# Patient Record
Sex: FEMALE | Race: WHITE | NOT HISPANIC OR LATINO
[De-identification: names, ages, dates, MRNs, and addresses within clinical notes are randomized per-mention and may not be internally consistent; named-entity substitution may affect disease eponyms.]

---

## 2017-02-02 ENCOUNTER — RX RENEWAL (OUTPATIENT)
Age: 82
End: 2017-02-02

## 2017-02-02 DIAGNOSIS — H40.1132 PRIMARY OPEN-ANGLE GLAUCOMA, BILATERAL, MODERATE STAGE: ICD-10-CM

## 2017-03-08 ENCOUNTER — APPOINTMENT (OUTPATIENT)
Dept: OPHTHALMOLOGY | Facility: CLINIC | Age: 82
End: 2017-03-08

## 2017-05-10 ENCOUNTER — APPOINTMENT (OUTPATIENT)
Dept: CARDIOLOGY | Facility: CLINIC | Age: 82
End: 2017-05-10

## 2017-06-20 ENCOUNTER — APPOINTMENT (OUTPATIENT)
Dept: OPHTHALMOLOGY | Facility: CLINIC | Age: 82
End: 2017-06-20

## 2017-09-12 ENCOUNTER — APPOINTMENT (OUTPATIENT)
Dept: CARDIOLOGY | Facility: CLINIC | Age: 82
End: 2017-09-12
Payer: MEDICARE

## 2017-09-12 PROCEDURE — 99214 OFFICE O/P EST MOD 30 MIN: CPT

## 2017-10-12 ENCOUNTER — APPOINTMENT (OUTPATIENT)
Dept: CARDIOLOGY | Facility: CLINIC | Age: 82
End: 2017-10-12
Payer: MEDICARE

## 2017-10-12 PROCEDURE — 93000 ELECTROCARDIOGRAM COMPLETE: CPT

## 2017-10-12 PROCEDURE — 99214 OFFICE O/P EST MOD 30 MIN: CPT

## 2017-10-13 ENCOUNTER — APPOINTMENT (OUTPATIENT)
Dept: CARDIOLOGY | Facility: CLINIC | Age: 82
End: 2017-10-13
Payer: MEDICARE

## 2017-10-13 PROCEDURE — A9502: CPT

## 2017-10-13 PROCEDURE — 78452 HT MUSCLE IMAGE SPECT MULT: CPT

## 2017-10-13 PROCEDURE — 93015 CV STRESS TEST SUPVJ I&R: CPT

## 2017-10-17 ENCOUNTER — APPOINTMENT (OUTPATIENT)
Dept: CARDIOLOGY | Facility: CLINIC | Age: 82
End: 2017-10-17
Payer: MEDICARE

## 2017-10-17 PROCEDURE — 99214 OFFICE O/P EST MOD 30 MIN: CPT

## 2017-10-24 ENCOUNTER — APPOINTMENT (OUTPATIENT)
Dept: OPHTHALMOLOGY | Facility: CLINIC | Age: 82
End: 2017-10-24
Payer: MEDICARE

## 2017-10-24 PROCEDURE — 92014 COMPRE OPH EXAM EST PT 1/>: CPT

## 2017-10-24 PROCEDURE — 92226: CPT | Mod: 50

## 2017-11-01 ENCOUNTER — APPOINTMENT (OUTPATIENT)
Dept: CARDIOLOGY | Facility: CLINIC | Age: 82
End: 2017-11-01
Payer: MEDICARE

## 2017-11-01 DIAGNOSIS — Z84.1 FAMILY HISTORY OF DISORDERS OF KIDNEY AND URETER: ICD-10-CM

## 2017-11-01 DIAGNOSIS — Z83.511 FAMILY HISTORY OF GLAUCOMA: ICD-10-CM

## 2017-11-01 DIAGNOSIS — Z78.9 OTHER SPECIFIED HEALTH STATUS: ICD-10-CM

## 2017-11-01 DIAGNOSIS — Z80.3 FAMILY HISTORY OF MALIGNANT NEOPLASM OF BREAST: ICD-10-CM

## 2017-11-01 DIAGNOSIS — E03.9 HYPOTHYROIDISM, UNSPECIFIED: ICD-10-CM

## 2017-11-01 DIAGNOSIS — Z82.49 FAMILY HISTORY OF ISCHEMIC HEART DISEASE AND OTHER DISEASES OF THE CIRCULATORY SYSTEM: ICD-10-CM

## 2017-11-01 PROCEDURE — 99214 OFFICE O/P EST MOD 30 MIN: CPT

## 2017-11-14 PROBLEM — Z78.9 NON-SMOKER: Status: ACTIVE | Noted: 2017-11-14

## 2017-11-14 PROBLEM — Z80.3 FAMILY HISTORY OF BREAST CANCER: Status: ACTIVE | Noted: 2017-11-14

## 2017-11-14 PROBLEM — Z82.49 FAMILY HISTORY OF CARDIAC DISORDER: Status: ACTIVE | Noted: 2017-11-14

## 2017-11-14 PROBLEM — Z84.1 FAMILY HISTORY OF KIDNEY DISEASE: Status: ACTIVE | Noted: 2017-11-14

## 2017-11-14 PROBLEM — Z83.511 FAMILY HISTORY OF GLAUCOMA: Status: ACTIVE | Noted: 2017-11-14

## 2017-11-14 PROBLEM — E03.9 HYPOTHYROIDISM: Status: ACTIVE | Noted: 2017-11-14

## 2017-11-28 ENCOUNTER — RECORD ABSTRACTING (OUTPATIENT)
Age: 82
End: 2017-11-28

## 2017-11-28 ENCOUNTER — MEDICATION RENEWAL (OUTPATIENT)
Age: 82
End: 2017-11-28

## 2017-12-06 ENCOUNTER — RX RENEWAL (OUTPATIENT)
Age: 82
End: 2017-12-06

## 2017-12-08 ENCOUNTER — RX RENEWAL (OUTPATIENT)
Age: 82
End: 2017-12-08

## 2017-12-11 ENCOUNTER — APPOINTMENT (OUTPATIENT)
Dept: CARDIOLOGY | Facility: CLINIC | Age: 82
End: 2017-12-11
Payer: MEDICARE

## 2017-12-11 PROCEDURE — 93880 EXTRACRANIAL BILAT STUDY: CPT

## 2017-12-11 PROCEDURE — 93306 TTE W/DOPPLER COMPLETE: CPT

## 2017-12-17 ENCOUNTER — RX RENEWAL (OUTPATIENT)
Age: 82
End: 2017-12-17

## 2017-12-17 ENCOUNTER — MESSAGE (OUTPATIENT)
Age: 82
End: 2017-12-17

## 2018-01-17 ENCOUNTER — APPOINTMENT (OUTPATIENT)
Dept: CARDIOLOGY | Facility: CLINIC | Age: 83
End: 2018-01-17
Payer: MEDICARE

## 2018-01-17 VITALS
WEIGHT: 152 LBS | BODY MASS INDEX: 25.33 KG/M2 | HEART RATE: 52 BPM | DIASTOLIC BLOOD PRESSURE: 62 MMHG | SYSTOLIC BLOOD PRESSURE: 118 MMHG | HEIGHT: 65 IN

## 2018-01-17 PROCEDURE — 93000 ELECTROCARDIOGRAM COMPLETE: CPT

## 2018-01-17 PROCEDURE — 99214 OFFICE O/P EST MOD 30 MIN: CPT

## 2018-01-26 ENCOUNTER — APPOINTMENT (OUTPATIENT)
Dept: CARDIOLOGY | Facility: CLINIC | Age: 83
End: 2018-01-26

## 2018-03-20 ENCOUNTER — APPOINTMENT (OUTPATIENT)
Dept: CARDIOLOGY | Facility: CLINIC | Age: 83
End: 2018-03-20
Payer: MEDICARE

## 2018-03-20 ENCOUNTER — NON-APPOINTMENT (OUTPATIENT)
Age: 83
End: 2018-03-20

## 2018-03-20 VITALS
DIASTOLIC BLOOD PRESSURE: 72 MMHG | HEIGHT: 65 IN | BODY MASS INDEX: 25.33 KG/M2 | WEIGHT: 152 LBS | HEART RATE: 63 BPM | SYSTOLIC BLOOD PRESSURE: 140 MMHG

## 2018-03-20 PROCEDURE — 99215 OFFICE O/P EST HI 40 MIN: CPT

## 2018-03-20 PROCEDURE — 93000 ELECTROCARDIOGRAM COMPLETE: CPT

## 2018-03-20 RX ORDER — DORZOLAMIDE HYDROCHLORIDE 20 MG/ML
2 SOLUTION OPHTHALMIC TWICE DAILY
Qty: 10 | Refills: 3 | Status: DISCONTINUED | COMMUNITY
Start: 2017-10-24 | End: 2018-03-20

## 2018-03-20 RX ORDER — METOPROLOL SUCCINATE 25 MG/1
25 TABLET, EXTENDED RELEASE ORAL DAILY
Refills: 0 | Status: DISCONTINUED | COMMUNITY
End: 2018-03-20

## 2018-03-20 RX ORDER — ARIPIPRAZOLE 5 MG/1
5 TABLET ORAL DAILY
Refills: 0 | Status: DISCONTINUED | COMMUNITY
End: 2018-03-20

## 2018-03-20 RX ORDER — DORZOLAMIDE HYDROCHLORIDE 20 MG/ML
2 SOLUTION OPHTHALMIC TWICE DAILY
Qty: 10 | Refills: 3 | Status: DISCONTINUED | COMMUNITY
Start: 2017-06-20 | End: 2018-03-20

## 2018-03-20 RX ORDER — ROPINIROLE 0.5 MG/1
0.5 TABLET, FILM COATED ORAL DAILY
Refills: 0 | Status: DISCONTINUED | COMMUNITY
End: 2018-03-20

## 2018-03-20 RX ORDER — DORZOLAMIDE HYDROCHLORIDE 20 MG/ML
2 SOLUTION OPHTHALMIC TWICE DAILY
Qty: 10 | Refills: 3 | Status: DISCONTINUED | COMMUNITY
Start: 2017-12-06 | End: 2018-03-20

## 2018-03-20 RX ORDER — LEVOTHYROXINE SODIUM 88 UG/1
88 TABLET ORAL DAILY
Refills: 0 | Status: DISCONTINUED | COMMUNITY
End: 2018-03-20

## 2018-03-20 RX ORDER — LATANOPROST/PF 0.005 %
0.01 DROPS OPHTHALMIC (EYE)
Qty: 10 | Refills: 3 | Status: DISCONTINUED | COMMUNITY
Start: 2017-10-24 | End: 2018-03-20

## 2018-03-20 RX ORDER — NAPROXEN 500 MG/1
500 TABLET ORAL DAILY
Refills: 0 | Status: DISCONTINUED | COMMUNITY
End: 2018-03-20

## 2018-03-27 ENCOUNTER — RX RENEWAL (OUTPATIENT)
Age: 83
End: 2018-03-27

## 2018-03-28 ENCOUNTER — APPOINTMENT (OUTPATIENT)
Dept: OPHTHALMOLOGY | Facility: CLINIC | Age: 83
End: 2018-03-28
Payer: MEDICARE

## 2018-03-28 PROCEDURE — 92014 COMPRE OPH EXAM EST PT 1/>: CPT

## 2018-05-09 ENCOUNTER — APPOINTMENT (OUTPATIENT)
Dept: CARDIOLOGY | Facility: CLINIC | Age: 83
End: 2018-05-09
Payer: MEDICARE

## 2018-05-09 VITALS
HEART RATE: 56 BPM | OXYGEN SATURATION: 94 % | RESPIRATION RATE: 16 BRPM | DIASTOLIC BLOOD PRESSURE: 71 MMHG | HEIGHT: 65 IN | SYSTOLIC BLOOD PRESSURE: 146 MMHG | BODY MASS INDEX: 25.33 KG/M2 | WEIGHT: 152 LBS

## 2018-05-09 PROCEDURE — 99214 OFFICE O/P EST MOD 30 MIN: CPT

## 2018-05-13 ENCOUNTER — RX RENEWAL (OUTPATIENT)
Age: 83
End: 2018-05-13

## 2018-06-03 ENCOUNTER — RX RENEWAL (OUTPATIENT)
Age: 83
End: 2018-06-03

## 2018-07-22 PROBLEM — H40.1132 PRIMARY OPEN ANGLE GLAUCOMA OF BOTH EYES, MODERATE STAGE: Status: ACTIVE | Noted: 2017-02-02

## 2018-07-25 ENCOUNTER — APPOINTMENT (OUTPATIENT)
Dept: CARDIOLOGY | Facility: CLINIC | Age: 83
End: 2018-07-25
Payer: MEDICARE

## 2018-07-25 VITALS
SYSTOLIC BLOOD PRESSURE: 132 MMHG | HEART RATE: 58 BPM | BODY MASS INDEX: 25.33 KG/M2 | HEIGHT: 65 IN | WEIGHT: 152 LBS | OXYGEN SATURATION: 97 % | DIASTOLIC BLOOD PRESSURE: 74 MMHG

## 2018-07-25 PROCEDURE — 99214 OFFICE O/P EST MOD 30 MIN: CPT

## 2018-07-25 RX ORDER — ESCITALOPRAM OXALATE 20 MG/1
20 TABLET, FILM COATED ORAL DAILY
Refills: 0 | Status: DISCONTINUED | COMMUNITY
End: 2018-07-25

## 2018-08-17 ENCOUNTER — RX RENEWAL (OUTPATIENT)
Age: 83
End: 2018-08-17

## 2018-10-01 ENCOUNTER — RX RENEWAL (OUTPATIENT)
Age: 83
End: 2018-10-01

## 2018-10-16 ENCOUNTER — APPOINTMENT (OUTPATIENT)
Dept: OPHTHALMOLOGY | Facility: CLINIC | Age: 83
End: 2018-10-16
Payer: MEDICARE

## 2018-10-16 PROCEDURE — 92014 COMPRE OPH EXAM EST PT 1/>: CPT

## 2018-10-16 PROCEDURE — 92133 CPTRZD OPH DX IMG PST SGM ON: CPT

## 2018-10-16 PROCEDURE — 92083 EXTENDED VISUAL FIELD XM: CPT

## 2018-11-08 ENCOUNTER — APPOINTMENT (OUTPATIENT)
Dept: CARDIOLOGY | Facility: CLINIC | Age: 83
End: 2018-11-08

## 2018-11-12 ENCOUNTER — APPOINTMENT (OUTPATIENT)
Dept: CARDIOLOGY | Facility: CLINIC | Age: 83
End: 2018-11-12
Payer: MEDICARE

## 2018-11-12 ENCOUNTER — RESULT CHARGE (OUTPATIENT)
Age: 83
End: 2018-11-12

## 2018-11-12 PROCEDURE — 93880 EXTRACRANIAL BILAT STUDY: CPT

## 2018-11-12 PROCEDURE — 93306 TTE W/DOPPLER COMPLETE: CPT

## 2018-11-13 ENCOUNTER — RX RENEWAL (OUTPATIENT)
Age: 83
End: 2018-11-13

## 2018-11-15 ENCOUNTER — APPOINTMENT (OUTPATIENT)
Dept: CARDIOLOGY | Facility: CLINIC | Age: 83
End: 2018-11-15
Payer: MEDICARE

## 2018-11-15 ENCOUNTER — NON-APPOINTMENT (OUTPATIENT)
Age: 83
End: 2018-11-15

## 2018-11-15 VITALS
HEIGHT: 65 IN | DIASTOLIC BLOOD PRESSURE: 78 MMHG | SYSTOLIC BLOOD PRESSURE: 132 MMHG | HEART RATE: 68 BPM | WEIGHT: 293 LBS | BODY MASS INDEX: 48.82 KG/M2

## 2018-11-15 DIAGNOSIS — R07.2 PRECORDIAL PAIN: ICD-10-CM

## 2018-11-15 PROCEDURE — 93000 ELECTROCARDIOGRAM COMPLETE: CPT

## 2018-11-15 PROCEDURE — 99214 OFFICE O/P EST MOD 30 MIN: CPT

## 2018-11-15 NOTE — REASON FOR VISIT
[Follow-Up - Clinic] : a clinic follow-up of [Chest Pain] : chest pain [Coronary Artery Disease] : coronary artery disease [Hyperlipidemia] : hyperlipidemia [Hypertension] : hypertension [FreeTextEntry1] : 89-year-old female comes in for a followup consultation pO2 have preoperative cardiac assessment and followup on her cardiovascular tests.\par  Now with Resolved chest pain symptoms. Improved, gastroesophageal reflux disease symptoms. No significant worsening of her exertional dyspnea. No PND, orthopnea, palpitation, dizziness, or syncopal episode.\par She's been compliant with other medications.She has been able to do 20 minutes on a treadmill at cardiac rehabilitation 3 times a week without any significant problems. \par There is no hemoptysis, hematemesis, melena, or black, tarry stool.\par She has no palpitations or syncopal episode.\par She has no visual disturbances or focal weakness.\par She does not have any increased caffeine or alcohol intake.

## 2018-11-15 NOTE — REVIEW OF SYSTEMS
[Fever] : no fever [Headache] : no headache [Recent Weight Gain (___ Lbs)] : no recent weight gain [Chills] : no chills [Feeling Fatigued] : not feeling fatigued [Recent Weight Loss (___ Lbs)] : no recent weight loss [Shortness Of Breath] : no shortness of breath [Dyspnea on exertion] : dyspnea during exertion [Chest  Pressure] : no chest pressure [Chest Pain] : no chest pain [Lower Ext Edema] : no extremity edema [Leg Claudication] : no intermittent leg claudication [Palpitations] : no palpitations [Abdominal Pain] : no abdominal pain [Nausea] : no nausea [Vomiting] : no vomiting [Heartburn] : heartburn [Change in Appetite] : no change in appetite [Change In The Stool] : no change in stool [Dysphagia] : no dysphagia [Negative] : Heme/Lymph

## 2018-11-15 NOTE — DISCUSSION/SUMMARY
[FreeTextEntry1] : Assessment and Suggestions:\par \par - Overall stable from cardiac point of view for catarct surgery.\par \par - chest pain:  Nuclear stress test 10-13-17 with no evidence of ischemia by myocardial perfusion scan.  Preserved LV systolic function. Nonexertional. EKG no acute ST, T changes.\par Symptoms have resolved with management of gastroesophageal reflux disease.\par Avoid caffeine and alcohol as well as spicy foods.\par  Red flag symptoms that would warrant emergent evaluation were reviewed with the patient. Remaining on Asa, Metoprolol and Crestor. \par \par -HTN. relatively Controlled, Decided against further adjustment in presence of her advanced age, coronary artery disease, risk of fall.\par \par -hyperlipidemia on Crestor, labs as above. \par \par -VHD. No clinical signs of congestive heart failure. Continue to follow. stable echo reviewed in presence of coronary disease, valvular heart disease, borderline pulmonary pressures, essential hypertension, dyspnea on exertion.\par \par -PVD. Carotid Doppler study. Any significant progression she would warrant further evaluation. Continue lifestyle and risk factor modifications.\par \par -H/O syncope no recurrance\par \par -She will have CMP, and fasting lipid panel checked.\par She gets her anemia, followed with hematologist\par \par \par Counseling regarding low saturated fat, low carbohydrate intake was reviewed. Active lifestyle and regular. Exercise is along with weight maintenance is advised.\par I have reviewed above at length. I answered all the questions. Patient verbalized understanding\par Thank you very much for allowing me to participate in your patient's care. Please feel free to call me for any questions.\par \par Followup in 3- 6 months off of any change in her symptoms.

## 2018-11-15 NOTE — PHYSICAL EXAM
[General Appearance - Well Developed] : well developed [Normal Appearance] : normal appearance [Well Groomed] : well groomed [General Appearance - Well Nourished] : well nourished [No Deformities] : no deformities [General Appearance - In No Acute Distress] : no acute distress [Normal Conjunctiva] : the conjunctiva exhibited no abnormalities [No Oral Pallor] : no oral pallor [Normal Jugular Venous A Waves Present] : normal jugular venous A waves present [Normal Jugular Venous V Waves Present] : normal jugular venous V waves present [No Jugular Venous Koehler A Waves] : no jugular venous koehler A waves [Respiration, Rhythm And Depth] : normal respiratory rhythm and effort [Exaggerated Use Of Accessory Muscles For Inspiration] : no accessory muscle use [Auscultation Breath Sounds / Voice Sounds] : lungs were clear to auscultation bilaterally [Heart Rate And Rhythm] : heart rate and rhythm were normal [Heart Sounds] : normal S1 and S2 [Murmurs] : no murmurs present [Abdomen Soft] : soft [Abnormal Walk] : normal gait [FreeTextEntry1] : Walks with limp.  [Nail Clubbing] : no clubbing of the fingernails [Cyanosis, Localized] : no localized cyanosis [Skin Color & Pigmentation] : normal skin color and pigmentation [] : no rash [Oriented To Time, Place, And Person] : oriented to person, place, and time [Affect] : the affect was normal [Mood] : the mood was normal [No Anxiety] : not feeling anxious

## 2018-11-15 NOTE — ASSESSMENT
[FreeTextEntry1] : Past tests for reference.\par \par cardiac catheterization 1-19-11 distal left main 50%, mid LAD 70% (LIMa-LAD normal), left circ luminal irregularities, mid RCA 90%, 75%, distal RCA 90% (SVG-RCA luminal irregularities), SVG-OM 10%\par \par echo 12/7/15; 60% lse calcified av/mv, mild mr.tr pasp 34 mm of hg.\par \par EKG ordered and interpreted by me on October 12, 2017 indication chest pain. Known coronary artery disease. Interpretation sinus bradycardia. Rightward axis septal infarct. Nonspecific ST-T changes.\par \par echo 12-1-16 ef 60% with normal ventricular function, mild diatolic dysfunction, mild [place]/MR, aneurysmal inter-atrial septum, c/w borderline PHTN pulmonary  pressures 38mmhg, moderate LAE \par \par carotid ultrasound 8-25-16 disease with BERNARDA/CCA ratio is pete due to low CCA velocities. Decrease in LICA/CCA ratio noted. \par \par nuclear stress test 10-13-17 lexiscan equivocal for ischemia by ekg with no evidence of infarction or inducible ischemia.\par \par labs 10-11-17 wbc 8.6, h/h 12.2/36.8, plat 208, Na+ 141, K 4.7, bun/creat 21/1.14\par \par EKG ordered and interpreted by me on March 20 2018. The patient has been interpretation coronary artery disease. In addition to a normal sinus rhythm. Baseline. The progression. No acute ST-T changes.\par \par Reviewed on November 15, 2018.\par Labs from October 24, 2018 were reviewed.\par Echocardiogram November 12, 2018 LV ejection fraction 60%. Mild aortic stenosis. Mild mitral regurgitation.\par Carotid Doppler study November 12, 2018. Nonobstructive disease \par EKG November 15, 2018. Indication coronary artery disease. Interpretation normal sinus rhythm. 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 No significant new changes. P.

## 2018-11-15 NOTE — HISTORY OF PRESENT ILLNESS
[FreeTextEntry1] : \par PMH as noted below:\par Coronary artery disease status post CABG, which includes LIMA to LAD. 4/2010 [facility]/[place].  Nuclear stress test from 10/17 with no significant perfusion defect. \par \par Essential hypertension:  Well controlled. \par \par History of having gastric ulcer with a GI bleed in December.  s/p transfusions. GI evaluation for PUD, followed with Dr. Ghosh in the past.\par \par Dyslipidemia. Remaining on statin.\par \par Moderate mitral regurgitation. Remaining stable.\par \par Mild pulmonary hypertension.  No CHF\par \par History of depression. improved.\par \par History of syncopal episode.  Implantable loop recorder. No significant findings.\par \par Bilateral moderate carotid stenosis.  Per past note; No significant change when evaluated in April 2015.  Remaining without neurological events \par

## 2018-11-21 ENCOUNTER — RX RENEWAL (OUTPATIENT)
Age: 83
End: 2018-11-21

## 2018-12-03 ENCOUNTER — APPOINTMENT (OUTPATIENT)
Dept: OPHTHALMOLOGY | Facility: AMBULATORY SURGERY CENTER | Age: 83
End: 2018-12-03

## 2018-12-04 ENCOUNTER — APPOINTMENT (OUTPATIENT)
Dept: OPHTHALMOLOGY | Facility: CLINIC | Age: 83
End: 2018-12-04

## 2019-02-21 ENCOUNTER — MEDICATION RENEWAL (OUTPATIENT)
Age: 84
End: 2019-02-21

## 2019-03-01 ENCOUNTER — RX RENEWAL (OUTPATIENT)
Age: 84
End: 2019-03-01

## 2019-03-18 ENCOUNTER — RX RENEWAL (OUTPATIENT)
Age: 84
End: 2019-03-18

## 2019-03-20 ENCOUNTER — APPOINTMENT (OUTPATIENT)
Dept: CARDIOLOGY | Facility: CLINIC | Age: 84
End: 2019-03-20
Payer: MEDICARE

## 2019-03-20 VITALS
HEIGHT: 65 IN | WEIGHT: 152 LBS | DIASTOLIC BLOOD PRESSURE: 74 MMHG | HEART RATE: 54 BPM | OXYGEN SATURATION: 96 % | BODY MASS INDEX: 25.33 KG/M2 | SYSTOLIC BLOOD PRESSURE: 110 MMHG

## 2019-03-20 DIAGNOSIS — Z01.810 ENCOUNTER FOR PREPROCEDURAL CARDIOVASCULAR EXAMINATION: ICD-10-CM

## 2019-03-20 PROCEDURE — 99214 OFFICE O/P EST MOD 30 MIN: CPT

## 2019-03-20 RX ORDER — TRAZODONE HYDROCHLORIDE 50 MG/1
50 TABLET ORAL DAILY
Refills: 0 | Status: DISCONTINUED | COMMUNITY
End: 2019-03-20

## 2019-03-20 RX ORDER — DORZOLAMIDE HYDROCHLORIDE 20 MG/ML
2 SOLUTION OPHTHALMIC TWICE DAILY
Qty: 1 | Refills: 3 | Status: DISCONTINUED | COMMUNITY
Start: 2018-08-17 | End: 2019-03-20

## 2019-03-20 RX ORDER — METOPROLOL SUCCINATE 25 MG/1
25 TABLET, EXTENDED RELEASE ORAL
Qty: 90 | Refills: 3 | Status: DISCONTINUED | COMMUNITY
Start: 2017-12-08 | End: 2019-03-20

## 2019-03-20 NOTE — ASSESSMENT
[FreeTextEntry1] : Past tests for reference.\par \par cardiac catheterization 1-19-11 distal left main 50%, mid LAD 70% (LIMa-LAD normal), left circ luminal irregularities, mid RCA 90%, 75%, distal RCA 90% (SVG-RCA luminal irregularities), SVG-OM 10%\par \par echo 12/7/15; 60% lse calcified av/mv, mild mr.tr pasp 34 mm of hg.\par \par EKG ordered and interpreted by me on October 12, 2017 indication chest pain. Known coronary artery disease. Interpretation sinus bradycardia. Rightward axis septal infarct. Nonspecific ST-T changes.\par \par echo 12-1-16 ef 60% with normal ventricular function, mild diatolic dysfunction, mild [place]/MR, aneurysmal inter-atrial septum, c/w borderline PHTN pulmonary  pressures 38mmhg, moderate LAE \par \par carotid ultrasound 8-25-16 disease with BERNARDA/CCA ratio is pete due to low CCA velocities. Decrease in LICA/CCA ratio noted. \par \par nuclear stress test 10-13-17 lexiscan equivocal for ischemia by ekg with no evidence of infarction or inducible ischemia.\par \par labs 10-11-17 wbc 8.6, h/h 12.2/36.8, plat 208, Na+ 141, K 4.7, bun/creat 21/1.14\par \par EKG ordered and interpreted by me on March 20 2018. The patient has been interpretation coronary artery disease. In addition to a normal sinus rhythm. Baseline. The progression. No acute ST-T changes.\par \par Reviewed on November 15, 2018.\par Labs from October 24, 2018 were reviewed.\par Echocardiogram November 12, 2018 LV ejection fraction 60%. Mild aortic stenosis. Mild mitral regurgitation.\par Carotid Doppler study November 12, 2018. Nonobstructive disease \par EKG November 15, 2018. Indication coronary artery disease. Interpretation normal sinus rhythm. 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 No significant new changes. \par \par reviewed 3/20/19\par hospital data reviewed

## 2019-03-20 NOTE — HISTORY OF PRESENT ILLNESS
[FreeTextEntry1] : \par PMH as noted below:\par Coronary artery disease status post CABG, which includes LIMA to LAD. 4/2010 [facility]/[place].  Nuclear stress test from 10/17 with no significant perfusion defect. \par \par Essential hypertension:  Well controlled. \par \par History of having gastric ulcer with a GI bleed in December.  s/p transfusions. GI evaluation for PUD, followed with Dr. Ghosh in the past.\par \par Dyslipidemia. Remaining on statin.\par \par Moderate mitral regurgitation. Remaining stable.\par \par Mild pulmonary hypertension.  No CHF\par \par History of depression. improved.\par \par History of syncopal episode.  Implantable loop recorder. No significant findings.\par \par Bilateral moderate carotid stenosis.  Per past note; No significant change when evaluated in April 2015.  Remaining without neurological events \par

## 2019-03-20 NOTE — ASSESSMENT
[FreeTextEntry1] : Past tests for reference.\par \par cardiac catheterization 1-19-11 distal left main 50%, mid LAD 70% (LIMa-LAD normal), left circ luminal irregularities, mid RCA 90%, 75%, distal RCA 90% (SVG-RCA luminal irregularities), SVG-OM 10%\par \par echo 12/7/15; 60% lse calcified av/mv, mild mr.tr pasp 34 mm of hg.\par \par EKG ordered and interpreted by me on October 12, 2017 indication chest pain. Known coronary artery disease. Interpretation sinus bradycardia. Rightward axis septal infarct. Nonspecific ST-T changes.\par \par echo 12-1-16 ef 60% with normal ventricular function, mild diatolic dysfunction, mild [place]/MR, aneurysmal inter-atrial septum, c/w borderline PHTN pulmonary  pressures 38mmhg, moderate LAE \par \par carotid ultrasound 8-25-16 disease with BERNARDA/CCA ratio is pete due to low CCA velocities. Decrease in LICA/CCA ratio noted. \par \par nuclear stress test 10-13-17 lexiscan equivocal for ischemia by ekg with no evidence of infarction or inducible ischemia.\par \par labs 10-11-17 wbc 8.6, h/h 12.2/36.8, plat 208, Na+ 141, K 4.7, bun/creat 21/1.14\par \par EKG ordered and interpreted by me on March 20 2018. The patient has been interpretation coronary artery disease. In addition to a normal sinus rhythm. Baseline. The progression. No acute ST-T changes.\par \par Reviewed on November 15, 2018.\par Labs from October 24, 2018 were reviewed.\par Echocardiogram November 12, 2018 LV ejection fraction 60%. Mild aortic stenosis. Mild mitral regurgitation.\par Carotid Doppler study November 12, 2018. Nonobstructive disease \par EKG November 15, 2018. Indication coronary artery disease. Interpretation normal sinus rhythm. 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 No significant new changes. \par \par reviewed 3/20/19\par hospital data reviewed

## 2019-03-20 NOTE — REASON FOR VISIT
[Follow-Up - From Hospitalization] : follow-up of a recent hospitalization for [Coronary Artery Disease] : coronary artery disease [Chest Pain] : chest pain [Hyperlipidemia] : hyperlipidemia [Hypertension] : hypertension [FreeTextEntry1] : 89-year-old comes in after recent hospitalization. She had significant substernal chest pressure appear typical for her heartburn. Persistent nature. A modified with meals. No other associated cardiovascular symptoms. Moderate severity. It relieved with GI cocktail in the emergency room. EKG chest x-ray. Labs were reviewed from the emergency room. She was absorbed. She did not have any acute myocardial infarction. Her medications were changed and famotidine was added to the omeprazole, which she is taking once daily. Symptoms have improved.No significant worsening of her exertional dyspnea. No PND, orthopnea, palpitation, dizziness, or syncopal episode.\par She's been compliant with other medications.She has been able to do 20 minutes on a treadmill at cardiac rehabilitation 3 times a week without any significant problems. \par There is no hemoptysis, hematemesis, melena, or black, tarry stool.\par She has no palpitations or syncopal episode.\par She has no visual disturbances or focal weakness.\par She does not have any increased caffeine or alcohol intake.

## 2019-03-20 NOTE — DISCUSSION/SUMMARY
[FreeTextEntry1] : Assessment and Suggestions:\par \par - chest pain:  Nuclear stress test 10-13-17 with no evidence of ischemia by myocardial perfusion scan.  Preserved LV systolic function. Nonexertional. EKG no acute ST, T changes.\par Gastroesophageal reflux disease. A severe symptoms. Recent hospitalization. Famotidine 20 mg added to omeprazole. If continued symptoms in spite of that, I would consider invasive coronary angiography or nuclear myocardial perfusion scan. Avoid caffeine. A spicy food.\par  Red flag symptoms that would warrant emergent evaluation were reviewed with the patient. Remaining on Asa, Metoprolol and Crestor. \par \par -HTN. relatively Controlled, Decided against further adjustment in presence of her advanced age, coronary artery disease, risk of fall.\par \par -hyperlipidemia on Crestor, labs as above. \par \par -VHD. No clinical signs of congestive heart failure. Continue to follow. stable echo reviewed in presence of coronary disease, valvular heart disease, borderline pulmonary pressures, essential hypertension, dyspnea on exertion.\par \par -PVD. Carotid Doppler study. Any significant progression she would warrant further evaluation. Continue lifestyle and risk factor modifications.\par \par -H/O syncope no recurrance\par \par She gets her anemia, followed with hematologist\par \par \par Counseling regarding low saturated fat, low carbohydrate intake was reviewed. Active lifestyle and regular. Exercise is along with weight maintenance is advised.\par I have reviewed above at length. I answered all the questions. Patient verbalized understanding\par Thank you very much for allowing me to participate in your patient's care. Please feel free to call me for any questions.\par \par Followup in 1 week  or for any change in her symptoms.

## 2019-03-20 NOTE — REVIEW OF SYSTEMS
[Fever] : no fever [Headache] : no headache [Recent Weight Gain (___ Lbs)] : no recent weight gain [Chills] : no chills [Feeling Fatigued] : not feeling fatigued [Recent Weight Loss (___ Lbs)] : no recent weight loss [see HPI] : see HPI [Shortness Of Breath] : no shortness of breath [Dyspnea on exertion] : dyspnea during exertion [Chest  Pressure] : no chest pressure [Chest Pain] : no chest pain [Lower Ext Edema] : no extremity edema [Leg Claudication] : no intermittent leg claudication [Palpitations] : no palpitations [Abdominal Pain] : no abdominal pain [Nausea] : no nausea [Vomiting] : no vomiting [Heartburn] : heartburn [Change in Appetite] : no change in appetite [Change In The Stool] : no change in stool [Dysphagia] : no dysphagia [Negative] : Heme/Lymph

## 2019-03-20 NOTE — HISTORY OF PRESENT ILLNESS
[FreeTextEntry1] : \par PMH as noted below:\par Coronary artery disease status post CABG, which includes LIMA to LAD. 4/2010 [facility]/[place].  Nuclear stress test from 10/17 with no significant perfusion defect. \par \par Essential hypertension:  Well controlled. \par \par History of having gastric ulcer with a GI bleed in December.  s/p transfusions. GI evaluation for PUD, followed with Dr. Ghosh in the past.\par \par Dyslipidemia. Remaining on statin.\par \par Moderate mitral regurgitation. Remaining stable.\par \par Mild pulmonary hypertension.  No CHF\par \par History of depression. improved.\par \par History of syncopal episode.  Implantable loop recorder. No significant findings.\par \par Bilateral moderate carotid stenosis.  Per past note; No significant change when evaluated in April 2015.  Remaining without neurological events \par

## 2019-03-26 ENCOUNTER — RECORD ABSTRACTING (OUTPATIENT)
Age: 84
End: 2019-03-26

## 2019-04-03 ENCOUNTER — APPOINTMENT (OUTPATIENT)
Dept: CARDIOLOGY | Facility: CLINIC | Age: 84
End: 2019-04-03
Payer: MEDICARE

## 2019-04-03 VITALS
HEART RATE: 60 BPM | BODY MASS INDEX: 25.33 KG/M2 | SYSTOLIC BLOOD PRESSURE: 116 MMHG | HEIGHT: 65 IN | WEIGHT: 152 LBS | DIASTOLIC BLOOD PRESSURE: 50 MMHG | OXYGEN SATURATION: 95 %

## 2019-04-03 PROCEDURE — 99213 OFFICE O/P EST LOW 20 MIN: CPT

## 2019-04-03 NOTE — HISTORY OF PRESENT ILLNESS
[FreeTextEntry1] : \par PMH as noted below:\par Coronary artery disease status post CABG, which includes LIMA to LAD. 4/2010 [facility]/[place].  Nuclear stress test from 10/17 with no significant perfusion defect. \par \par Essential hypertension:  Well controlled. \par \par History of having gastric ulcer with a GI bleed in December.  s/p transfusions. GI evaluation for PUD, followed with Dr. Ghosh in the past.\par \par Dyslipidemia. Remaining on statin.\par \par Moderate mitral regurgitation. Remaining stable.\par \par Mild pulmonary hypertension.  No CHF\par \par History of depression. improved.\par \par History of syncopal episode.  Implantable loop recorder. No significant findings.\par \par Bilateral moderate carotid stenosis.  Per past note; No significant change when evaluated in April 2015.  Remaining without neurological events \par

## 2019-04-03 NOTE — ASSESSMENT
[FreeTextEntry1] : Past tests for reference.\par cardiac catheterization 1-19-11 distal left main 50%, mid LAD 70% (LIMa-LAD normal), left circ luminal irregularities, mid RCA 90%, 75%, distal RCA 90% (SVG-RCA luminal irregularities), SVG-OM 10%\par \par echo 12/7/15; 60% lse calcified av/mv, mild mr.tr pasp 34 mm of hg.\par \par EKG ordered and interpreted by me on October 12, 2017 indication chest pain. Known coronary artery disease. Interpretation sinus bradycardia. Rightward axis septal infarct. Nonspecific ST-T changes.\par \par echo 12-1-16 ef 60% with normal ventricular function, mild diatolic dysfunction, mild [place]/MR, aneurysmal inter-atrial septum, c/w borderline PHTN pulmonary  pressures 38mmhg, moderate LAE \par \par carotid ultrasound 8-25-16 disease with BERNARDA/CCA ratio is pete due to low CCA velocities. Decrease in LICA/CCA ratio noted. \par \par nuclear stress test 10-13-17 lexiscan equivocal for ischemia by ekg with no evidence of infarction or inducible ischemia.\par \par labs 10-11-17 wbc 8.6, h/h 12.2/36.8, plat 208, Na+ 141, K 4.7, bun/creat 21/1.14\par \par EKG ordered and interpreted by me on March 20 2018. The patient has been interpretation coronary artery disease. In addition to a normal sinus rhythm. Baseline. The progression. No acute ST-T changes.\par \par Reviewed on November 15, 2018.\par Labs from October 24, 2018 were reviewed.\par Echocardiogram November 12, 2018 LV ejection fraction 60%. Mild aortic stenosis. Mild mitral regurgitation.\par Carotid Doppler study November 12, 2018. Nonobstructive disease \par EKG November 15, 2018. Indication coronary artery disease. Interpretation normal sinus rhythm. 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 No significant new changes. \par \par reviewed 3/20/19\par hospital data reviewed

## 2019-04-03 NOTE — DISCUSSION/SUMMARY
[FreeTextEntry1] : Assessment and Suggestions:\par \par - chest pain:  Nuclear stress test 10-13-17 with no evidence of ischemia by myocardial perfusion scan.  Preserved LV systolic function.Continue PPI plus H2 blockers.\par If more symptoms in spite of dose, then to consider invasive coronary angiography or nuclear myocardial perfusion scan.\par Avoid coffee and spicy food.\par  Red flag symptoms that would warrant emergent evaluation were reviewed with the patient. Remaining on Asa, Metoprolol and Crestor. \par \par -HTN. relatively Controlled, Decided against further adjustment in presence of her advanced age, coronary artery disease, risk of fall.\par \par -hyperlipidemia on Crestor, labs as above. \par \par -VHD. No clinical signs of congestive heart failure. Continue to follow. stable echo reviewed in presence of coronary disease, valvular heart disease, borderline pulmonary pressures, essential hypertension, dyspnea on exertion.\par \par -PVD. Carotid Doppler study. Any significant progression she would warrant further evaluation. Continue lifestyle and risk factor modifications.\par \par -H/O syncope no recurrance\par \par She gets her anemia, followed with hematologist\par \par \par Counseling regarding low saturated fat, low carbohydrate intake was reviewed. Active lifestyle and regular. Exercise is along with weight maintenance is advised.\par I have reviewed above at length. I answered all the questions. Patient verbalized understanding\par Thank you very much for allowing me to participate in your patient's care. Please feel free to call me for any questions.\par \par Followup in 4-6 months

## 2019-04-03 NOTE — REASON FOR VISIT
[Follow-Up - Clinic] : a clinic follow-up of [Chest Pain] : chest pain [Coronary Artery Disease] : coronary artery disease [Hypertension] : hypertension [FreeTextEntry1] : 89-year-old female comes in for followup consultation of the recent changes in medication for management of atypical chest pain, probably related to gastroesophageal reflux disease, and presence of known history of coronary artery disease with angina.\par Her symptoms have significantly improved after taking famotidine at nighttime.\par There is no hemoptysis, hematemesis, melena, or black, tarry stool.\par She has no palpitations or syncopal episode.\par She has no visual disturbances or focal weakness.\par She does not have any increased caffeine or alcohol intake.

## 2019-04-03 NOTE — PHYSICAL EXAM
[General Appearance - Well Developed] : well developed [Normal Appearance] : normal appearance [Well Groomed] : well groomed [General Appearance - Well Nourished] : well nourished [No Deformities] : no deformities [General Appearance - In No Acute Distress] : no acute distress [Normal Conjunctiva] : the conjunctiva exhibited no abnormalities [No Oral Pallor] : no oral pallor [Normal Jugular Venous A Waves Present] : normal jugular venous A waves present [Normal Jugular Venous V Waves Present] : normal jugular venous V waves present [No Jugular Venous Koehler A Waves] : no jugular venous koehler A waves [] : no respiratory distress [Respiration, Rhythm And Depth] : normal respiratory rhythm and effort [Exaggerated Use Of Accessory Muscles For Inspiration] : no accessory muscle use [Auscultation Breath Sounds / Voice Sounds] : lungs were clear to auscultation bilaterally [Heart Rate And Rhythm] : heart rate and rhythm were normal [Heart Sounds] : normal S1 and S2 [Arterial Pulses Normal] : the arterial pulses were normal [Edema] : no peripheral edema present [Abdomen Soft] : soft [Abnormal Walk] : normal gait [FreeTextEntry1] : Walks with limp.  [Nail Clubbing] : no clubbing of the fingernails [Cyanosis, Localized] : no localized cyanosis [Skin Color & Pigmentation] : normal skin color and pigmentation [Oriented To Time, Place, And Person] : oriented to person, place, and time [Affect] : the affect was normal [Mood] : the mood was normal [No Anxiety] : not feeling anxious

## 2019-04-19 ENCOUNTER — RX RENEWAL (OUTPATIENT)
Age: 84
End: 2019-04-19

## 2019-04-30 ENCOUNTER — RX RENEWAL (OUTPATIENT)
Age: 84
End: 2019-04-30

## 2019-05-27 ENCOUNTER — RX RENEWAL (OUTPATIENT)
Age: 84
End: 2019-05-27

## 2019-06-11 ENCOUNTER — RX RENEWAL (OUTPATIENT)
Age: 84
End: 2019-06-11

## 2019-09-30 ENCOUNTER — APPOINTMENT (OUTPATIENT)
Dept: CARDIOLOGY | Facility: CLINIC | Age: 84
End: 2019-09-30
Payer: MEDICARE

## 2019-09-30 VITALS
HEART RATE: 61 BPM | BODY MASS INDEX: 25.33 KG/M2 | HEIGHT: 65 IN | SYSTOLIC BLOOD PRESSURE: 102 MMHG | WEIGHT: 152 LBS | OXYGEN SATURATION: 93 % | DIASTOLIC BLOOD PRESSURE: 62 MMHG

## 2019-09-30 PROCEDURE — 99214 OFFICE O/P EST MOD 30 MIN: CPT

## 2019-09-30 RX ORDER — ROSUVASTATIN CALCIUM 10 MG/1
10 TABLET, FILM COATED ORAL
Qty: 90 | Refills: 3 | Status: DISCONTINUED | COMMUNITY
Start: 2017-12-17 | End: 2019-09-30

## 2019-09-30 RX ORDER — SERTRALINE HYDROCHLORIDE 50 MG/1
50 TABLET, FILM COATED ORAL DAILY
Refills: 0 | Status: DISCONTINUED | COMMUNITY
End: 2019-09-30

## 2019-09-30 NOTE — PHYSICAL EXAM
[General Appearance - Well Developed] : well developed [Normal Appearance] : normal appearance [Well Groomed] : well groomed [General Appearance - Well Nourished] : well nourished [No Deformities] : no deformities [General Appearance - In No Acute Distress] : no acute distress [Normal Conjunctiva] : the conjunctiva exhibited no abnormalities [No Oral Pallor] : no oral pallor [Normal Jugular Venous A Waves Present] : normal jugular venous A waves present [Normal Jugular Venous V Waves Present] : normal jugular venous V waves present [No Jugular Venous Koehler A Waves] : no jugular venous koehler A waves [] : no respiratory distress [Respiration, Rhythm And Depth] : normal respiratory rhythm and effort [Exaggerated Use Of Accessory Muscles For Inspiration] : no accessory muscle use [Heart Rate And Rhythm] : heart rate and rhythm were normal [Auscultation Breath Sounds / Voice Sounds] : lungs were clear to auscultation bilaterally [Arterial Pulses Normal] : the arterial pulses were normal [Heart Sounds] : normal S1 and S2 [Edema] : no peripheral edema present [Abdomen Soft] : soft [FreeTextEntry1] : Walks with limp.  [Abnormal Walk] : normal gait [Cyanosis, Localized] : no localized cyanosis [Nail Clubbing] : no clubbing of the fingernails [Skin Color & Pigmentation] : normal skin color and pigmentation [Oriented To Time, Place, And Person] : oriented to person, place, and time [Mood] : the mood was normal [Affect] : the affect was normal [No Anxiety] : not feeling anxious

## 2019-09-30 NOTE — REASON FOR VISIT
[Follow-Up - Clinic] : a clinic follow-up of [Chest Pain] : chest pain [Coronary Artery Disease] : coronary artery disease [Dizziness] : dizziness [Hypertension] : hypertension [FreeTextEntry1] : 89-year-old female comes in for followup consultation.\par Complain of intermittent mild dizziness. No associated palpitation. No associated chest pain, shortness of breath, PND, or orthopnea. Not related to activity and exercise, but she does eat at cardiac rehabilitation. Her blood pressure is stable.\par Her gastroesophageal reflux related symptoms have resolved.\par There is no hemoptysis, hematemesis, melena, or black, tarry stool.\par She has no palpitations or syncopal episode.\par She has no visual disturbances or focal weakness.\par She does not have any increased caffeine or alcohol intake.\par Denies any recent hospital admission

## 2019-09-30 NOTE — HISTORY OF PRESENT ILLNESS
[FreeTextEntry1] : \par PMH as noted below:\par Coronary artery disease status post CABG, which includes LIMA to LAD. 4/2010 [facility]/[place].  Nuclear stress test from 10/17 with no significant perfusion defect. \par \par Essential hypertension:  Well controlled. \par \par History of having gastric ulcer with a GI bleed in December.  s/p transfusions. GI evaluation for PUD, followed with Dr. Ghosh in the past.\par \par Dyslipidemia. Remaining on statin.\par \par Moderate mitral regurgitation. Remaining stable.\par \par Mild pulmonary hypertension.  No CHF\par \par History of depression. improved.\par \par History of syncopal episode.  Implantable loop recorder. No significant findings.\par \par Bilateral moderate carotid stenosis.  Per past note; No significant change when evaluated in April 2015.  Remaining without neurological events \par

## 2019-09-30 NOTE — ASSESSMENT
[FreeTextEntry1] : Past tests for reference.\par cardiac catheterization 1-19-11 distal left main 50%, mid LAD 70% (LIMa-LAD normal), left circ luminal irregularities, mid RCA 90%, 75%, distal RCA 90% (SVG-RCA luminal irregularities), SVG-OM 10%\par \par echo 12/7/15; 60% lse calcified av/mv, mild mr.tr pasp 34 mm of hg.\par \par EKG ordered and interpreted by me on October 12, 2017 indication chest pain. Known coronary artery disease. Interpretation sinus bradycardia. Rightward axis septal infarct. Nonspecific ST-T changes.\par \par echo 12-1-16 ef 60% with normal ventricular function, mild diatolic dysfunction, mild OK/MR, aneurysmal inter-atrial septum, c/w borderline PHTN pulmonary  pressures 38mmhg, moderate LAE \par \par carotid ultrasound 8-25-16 disease with BERNARDA/CCA ratio is pete due to low CCA velocities. Decrease in LICA/CCA ratio noted. \par \par nuclear stress test 10-13-17 lexiscan equivocal for ischemia by ekg with no evidence of infarction or inducible ischemia.\par \par labs 10-11-17 wbc 8.6, h/h 12.2/36.8, plat 208, Na+ 141, K 4.7, bun/creat 21/1.14\par \par EKG ordered and interpreted by me on March 20 2018. The patient has been interpretation coronary artery disease. In addition to a normal sinus rhythm. Baseline. The progression. No acute ST-T changes.\par \par Reviewed on November 15, 2018.\par Labs from October 24, 2018 were reviewed.\par Echocardiogram November 12, 2018 LV ejection fraction 60%. Mild aortic stenosis. Mild mitral regurgitation.\par Carotid Doppler study November 12, 2018. Nonobstructive disease \par EKG November 15, 2018. Indication coronary artery disease. Interpretation normal sinus rhythm. 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 No significant new changes. \par \par reviewed 9/30/19\par hospital data reviewed

## 2019-09-30 NOTE — REVIEW OF SYSTEMS
[Fever] : no fever [Recent Weight Gain (___ Lbs)] : no recent weight gain [Headache] : no headache [Chills] : no chills [Feeling Fatigued] : not feeling fatigued [Recent Weight Loss (___ Lbs)] : no recent weight loss [Shortness Of Breath] : no shortness of breath [Dyspnea on exertion] : dyspnea during exertion [Chest  Pressure] : no chest pressure [Lower Ext Edema] : no extremity edema [Chest Pain] : no chest pain [Leg Claudication] : no intermittent leg claudication [Palpitations] : no palpitations [Abdominal Pain] : no abdominal pain [Nausea] : no nausea [Heartburn] : heartburn [Vomiting] : no vomiting [Change In The Stool] : no change in stool [Change in Appetite] : no change in appetite [see HPI] : see HPI [Dysphagia] : no dysphagia [Dizziness] : dizziness [Tremor] : no tremor was seen [Numbness (Hypesthesia)] : no numbness [Convulsions] : no convulsions [Tingling (Paresthesia)] : no tingling [Negative] : Heme/Lymph

## 2019-09-30 NOTE — DISCUSSION/SUMMARY
[FreeTextEntry1] : Assessment and Suggestions:\par - Dizziness. Lower blood pressure. Mild orthostasis in the office. Discontinue isosorbide mononitrate. Discontinue valsartan, both of them are at very low dose.\par Follow blood pressure. Continue cardiac rehabilitation. If continued symptoms, and or increasing blood pressure of greater than 140/90/anginal symptom. She will contact us so that appropriate. Further evaluation, which may include longer term monitoring and restarting antihypertensive therapy.\par - chest pain: Resolved. Probably more related to gastroesophageal reflux disease 10. Coronary insufficiency.  Nuclear stress test 10-13-17 with no evidence of ischemia by myocardial perfusion scan.  Preserved LV systolic function.Continue PPI plus H2 blockers.\par She will have a repeat echocardiogram for ejection fraction evaluation.\par \par -HTN. Discontinue valsartan. Continue with metoprolol low dose in presence of known coronary artery disease.\par \par -hyperlipidemia on Crestor, labs as above. \par \par -VHD. No clinical signs of congestive heart failure. Continue to follow. Followup echocardiogram is ordered. in presence of coronary disease, valvular heart disease, borderline pulmonary pressures, essential hypertension, dyspnea on exertion.\par \par -PVD. Carotid Doppler study. Any significant progression she would warrant further evaluation. Continue lifestyle and risk factor modifications.\par \par She gets her anemia, followed with hematologist\par \par Counseling regarding low saturated fat, low carbohydrate intake was reviewed. Active lifestyle and regular. Exercise is along with weight maintenance is advised.\par I have reviewed above at length. I answered all the questions. Patient verbalized understanding\par Thank you very much for allowing me to participate in your patient's care. Please feel free to call me for any questions.\par \par Followup in 4-6 months

## 2019-10-11 ENCOUNTER — RX RENEWAL (OUTPATIENT)
Age: 84
End: 2019-10-11

## 2019-12-06 ENCOUNTER — APPOINTMENT (OUTPATIENT)
Dept: CARDIOLOGY | Facility: CLINIC | Age: 84
End: 2019-12-06
Payer: MEDICARE

## 2019-12-06 VITALS
BODY MASS INDEX: 25.33 KG/M2 | WEIGHT: 152 LBS | DIASTOLIC BLOOD PRESSURE: 72 MMHG | OXYGEN SATURATION: 95 % | HEIGHT: 65 IN | SYSTOLIC BLOOD PRESSURE: 170 MMHG | HEART RATE: 61 BPM

## 2019-12-06 DIAGNOSIS — Z87.09 PERSONAL HISTORY OF OTHER DISEASES OF THE RESPIRATORY SYSTEM: ICD-10-CM

## 2019-12-06 DIAGNOSIS — Z86.39 PERSONAL HISTORY OF OTHER ENDOCRINE, NUTRITIONAL AND METABOLIC DISEASE: ICD-10-CM

## 2019-12-06 PROCEDURE — 99214 OFFICE O/P EST MOD 30 MIN: CPT

## 2019-12-06 RX ORDER — VALSARTAN 40 MG/1
40 TABLET, COATED ORAL DAILY
Qty: 90 | Refills: 3 | Status: DISCONTINUED | COMMUNITY
Start: 2018-03-27 | End: 2019-12-06

## 2019-12-06 RX ORDER — ISOSORBIDE MONONITRATE 30 MG/1
30 TABLET, EXTENDED RELEASE ORAL DAILY
Qty: 90 | Refills: 1 | Status: DISCONTINUED | COMMUNITY
Start: 2018-05-13 | End: 2019-12-06

## 2019-12-06 RX ORDER — METOPROLOL SUCCINATE 25 MG/1
25 TABLET, EXTENDED RELEASE ORAL DAILY
Refills: 0 | Status: DISCONTINUED | COMMUNITY
End: 2019-12-06

## 2019-12-06 NOTE — REASON FOR VISIT
[Chest Pain] : chest pain [Follow-Up - Clinic] : a clinic follow-up of [Coronary Artery Disease] : coronary artery disease [Hypertension] : hypertension [Dizziness] : dizziness [FreeTextEntry1] : 89-year-old female comes in for followup after her hospitalization.  The day she went to hospital she didn't eat or drink anything that day. She went shopping with her daughter for about 2 hours. Upon returning to the car she became weak and slouched over against the car and slid down the car and was helped to the floor. According to hospital records she didn't lose consciousness. She was being treated for bronchitis at this time with oral ABx, prednisone and inhaler. At the time BP in the ED was 119/51. She had KRYSTIAN on labs. She was given IVF. She improved clinically. She was discharged on amlodipine 5mg daily. BP at home in the 140-150s, however she remains on prednisone for two more days.

## 2019-12-06 NOTE — DISCUSSION/SUMMARY
[FreeTextEntry1] : Assessment and Suggestions:\par - Hypertension: patient with history of mild orthostasis in the office. Discontinued isosorbide mononitrate. Discontinued valsartan, both of them were at very low dose. Patient now with elevated BP readings, however on oral prednisone. I would recommend continuing amlodipine 5mg daily and bring her back in two weeks for a BP check off the prednisone.\par Follow blood pressure. Continue cardiac rehabilitation once improved with her respiratory illness.\par - chest pain: Resolved. Probably more related to gastroesophageal reflux disease. \par Coronary arteryd disease.  Nuclear stress test 10-13-17 with no evidence of ischemia by myocardial perfusion scan.  Preserved LV systolic function. Continue PPI plus H2 blockers. Aspirin and statin therapy.\par She will have a repeat echocardiogram for ejection fraction evaluation.\par \par -hyperlipidemia on Crestor, labs as above. \par \par -VHD. No clinical signs of congestive heart failure. Continue to follow. Followup echocardiogram is ordered. in presence of coronary disease, valvular heart disease, borderline pulmonary pressures, essential hypertension, dyspnea on exertion.\par \par -PVD. Carotid Doppler study. Any significant progression she would warrant further evaluation. Continue lifestyle and risk factor modifications.\par \par

## 2019-12-06 NOTE — REVIEW OF SYSTEMS
[Fever] : no fever [Recent Weight Gain (___ Lbs)] : no recent weight gain [Headache] : no headache [Chills] : no chills [Feeling Fatigued] : not feeling fatigued [Shortness Of Breath] : no shortness of breath [Recent Weight Loss (___ Lbs)] : no recent weight loss [Chest  Pressure] : no chest pressure [Dyspnea on exertion] : dyspnea during exertion [Chest Pain] : no chest pain [Leg Claudication] : no intermittent leg claudication [Lower Ext Edema] : no extremity edema [Abdominal Pain] : no abdominal pain [Nausea] : no nausea [Palpitations] : no palpitations [Heartburn] : heartburn [Vomiting] : no vomiting [Change in Appetite] : no change in appetite [Change In The Stool] : no change in stool [Dysphagia] : no dysphagia [see HPI] : see HPI [Dizziness] : dizziness [Numbness (Hypesthesia)] : no numbness [Tremor] : no tremor was seen [Convulsions] : no convulsions [Tingling (Paresthesia)] : no tingling [Negative] : Endocrine

## 2019-12-06 NOTE — ASSESSMENT
[FreeTextEntry1] : Past tests for reference.\par cardiac catheterization 1-19-11 distal left main 50%, mid LAD 70% (LIMa-LAD normal), left circ luminal irregularities, mid RCA 90%, 75%, distal RCA 90% (SVG-RCA luminal irregularities), SVG-OM 10%\par \par echo 12/7/15; 60% lse calcified av/mv, mild mr.tr pasp 34 mm of hg.\par \par EKG ordered and interpreted by me on October 12, 2017 indication chest pain. Known coronary artery disease. Interpretation sinus bradycardia. Rightward axis septal infarct. Nonspecific ST-T changes.\par \par echo 12-1-16 ef 60% with normal ventricular function, mild diatolic dysfunction, mild LA/MR, aneurysmal inter-atrial septum, c/w borderline PHTN pulmonary  pressures 38mmhg, moderate LAE \par \par carotid ultrasound 8-25-16 disease with BERNARDA/CCA ratio is pete due to low CCA velocities. Decrease in LICA/CCA ratio noted. \par \par nuclear stress test 10-13-17 lexiscan equivocal for ischemia by ekg with no evidence of infarction or inducible ischemia.\par \par labs 10-11-17 wbc 8.6, h/h 12.2/36.8, plat 208, Na+ 141, K 4.7, bun/creat 21/1.14\par \par EKG ordered and interpreted by me on March 20 2018. The patient has been interpretation coronary artery disease. In addition to a normal sinus rhythm. Baseline. The progression. No acute ST-T changes.\par \par Reviewed on November 15, 2018.\par Labs from October 24, 2018 were reviewed.\par Echocardiogram November 12, 2018 LV ejection fraction 60%. Mild aortic stenosis. Mild mitral regurgitation.\par Carotid Doppler study November 12, 2018. Nonobstructive disease \par EKG November 15, 2018. Indication coronary artery disease. Interpretation normal sinus rhythm. 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 No significant new changes. \par \par reviewed 9/30/19\par hospital data reviewed

## 2019-12-06 NOTE — PHYSICAL EXAM
[Well Groomed] : well groomed [Normal Appearance] : normal appearance [General Appearance - Well Developed] : well developed [General Appearance - In No Acute Distress] : no acute distress [General Appearance - Well Nourished] : well nourished [No Deformities] : no deformities [No Oral Pallor] : no oral pallor [Normal Conjunctiva] : the conjunctiva exhibited no abnormalities [] : no respiratory distress [Respiration, Rhythm And Depth] : normal respiratory rhythm and effort [Auscultation Breath Sounds / Voice Sounds] : lungs were clear to auscultation bilaterally [Exaggerated Use Of Accessory Muscles For Inspiration] : no accessory muscle use [Heart Rate And Rhythm] : heart rate and rhythm were normal [Heart Sounds] : normal S1 and S2 [Arterial Pulses Normal] : the arterial pulses were normal [Edema] : no peripheral edema present [Abnormal Walk] : normal gait [FreeTextEntry1] : slow gait [Cyanosis, Localized] : no localized cyanosis [Skin Color & Pigmentation] : normal skin color and pigmentation [Nail Clubbing] : no clubbing of the fingernails [Affect] : the affect was normal [Mood] : the mood was normal [Oriented To Time, Place, And Person] : oriented to person, place, and time [No Anxiety] : not feeling anxious

## 2019-12-06 NOTE — HISTORY OF PRESENT ILLNESS
[FreeTextEntry1] : \par PMH as noted below:\par Coronary artery disease status post CABG, which includes LIMA to LAD. 4/2010 [facility]/[place].  Nuclear stress test from 10/17 with no significant perfusion defect. \par \par Essential hypertension:  Well controlled. \par \par History of having gastric ulcer with a GI bleed in December.  s/p transfusions. GI evaluation for PUD, followed with Dr. Ghosh in the past.\par \par Dyslipidemia. Remaining on statin.\par \par Moderate mitral regurgitation. Remaining stable.\par \par Mild pulmonary hypertension.  No CHF\par \par History of depression. improved.\par \par History of syncopal episode.  Implantable loop recorder. No significant findings.\par \par Bilateral moderate carotid stenosis.  Per past note; No significant change when evaluated in April 2015.  Remaining without neurological events \par

## 2019-12-17 ENCOUNTER — APPOINTMENT (OUTPATIENT)
Dept: CARDIOLOGY | Facility: CLINIC | Age: 84
End: 2019-12-17
Payer: MEDICARE

## 2019-12-17 VITALS
HEART RATE: 68 BPM | SYSTOLIC BLOOD PRESSURE: 118 MMHG | OXYGEN SATURATION: 97 % | HEIGHT: 65 IN | WEIGHT: 152 LBS | DIASTOLIC BLOOD PRESSURE: 60 MMHG | BODY MASS INDEX: 25.33 KG/M2

## 2019-12-17 PROCEDURE — 99214 OFFICE O/P EST MOD 30 MIN: CPT

## 2019-12-17 RX ORDER — BRINZOLAMIDE/BRIMONIDINE TARTRATE 10; 2 MG/ML; MG/ML
1-0.2 SUSPENSION/ DROPS OPHTHALMIC
Refills: 0 | Status: ACTIVE | COMMUNITY

## 2019-12-17 RX ORDER — FAMOTIDINE 20 MG/1
20 TABLET, FILM COATED ORAL
Qty: 90 | Refills: 3 | Status: DISCONTINUED | COMMUNITY
Start: 2019-03-17 | End: 2019-12-17

## 2019-12-17 NOTE — ASSESSMENT
[FreeTextEntry1] : Past tests for reference.\par cardiac catheterization 1-19-11 distal left main 50%, mid LAD 70% (LIMa-LAD normal), left circ luminal irregularities, mid RCA 90%, 75%, distal RCA 90% (SVG-RCA luminal irregularities), SVG-OM 10%\par \par echo 12/7/15; 60% lse calcified av/mv, mild mr.tr pasp 34 mm of hg.\par \par EKG ordered and interpreted by me on October 12, 2017 indication chest pain. Known coronary artery disease. Interpretation sinus bradycardia. Rightward axis septal infarct. Nonspecific ST-T changes.\par \par echo 12-1-16 ef 60% with normal ventricular function, mild diatolic dysfunction, mild RI/MR, aneurysmal inter-atrial septum, c/w borderline PHTN pulmonary  pressures 38mmhg, moderate LAE \par \par carotid ultrasound 8-25-16 disease with BERNARDA/CCA ratio is pete due to low CCA velocities. Decrease in LICA/CCA ratio noted. \par \par nuclear stress test 10-13-17 lexiscan equivocal for ischemia by ekg with no evidence of infarction or inducible ischemia.\par \par labs 10-11-17 wbc 8.6, h/h 12.2/36.8, plat 208, Na+ 141, K 4.7, bun/creat 21/1.14\par \par EKG ordered and interpreted by me on March 20 2018. The patient has been interpretation coronary artery disease. In addition to a normal sinus rhythm. Baseline. The progression. No acute ST-T changes.\par \par Reviewed on November 15, 2018.\par Labs from October 24, 2018 were reviewed.\par Echocardiogram November 12, 2018 LV ejection fraction 60%. Mild aortic stenosis. Mild mitral regurgitation.\par Carotid Doppler study November 12, 2018. Nonobstructive disease \par EKG November 15, 2018. Indication coronary artery disease. Interpretation normal sinus rhythm. 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 No significant new changes. \par \par reviewed 9/30/19\par hospital data reviewed

## 2019-12-17 NOTE — DISCUSSION/SUMMARY
[FreeTextEntry1] : Assessment and Suggestions:\par - Hypertension: patient with history of mild orthostasis in the office last visit. I discontinued isosorbide mononitrate. Discontinued valsartan, both of them were at very low dose.  I would recommend continuing amlodipine 5mg daily as BPs have been well controlled and patient with no lightheadedness, fatigue, or syncope. \par Follow blood pressure. Continue cardiac rehabilitation once improved with her respiratory illness.\par - chest pain: Resolved. Probably more related to gastroesophageal reflux disease. \par Coronary arteryd disease.  Nuclear stress test 10-13-17 with no evidence of ischemia by myocardial perfusion scan.  Preserved LV systolic function. Continue PPI plus H2 blockers. Aspirin and statin therapy.\par She will have a repeat echocardiogram for ejection fraction evaluation.\par \par -hyperlipidemia on Crestor, labs as above. \par \par -VHD. No clinical signs of congestive heart failure. Continue to follow. Followup echocardiogram is ordered. in presence of coronary disease, valvular heart disease, borderline pulmonary pressures, essential hypertension, dyspnea on exertion.\par \par -PVD. Carotid Doppler study. Any significant progression she would warrant further evaluation. Continue lifestyle and risk factor modifications.\par \par

## 2019-12-17 NOTE — REASON FOR VISIT
[Follow-Up - Clinic] : a clinic follow-up of [Chest Pain] : chest pain [Coronary Artery Disease] : coronary artery disease [Dizziness] : dizziness [Hypertension] : hypertension [FreeTextEntry1] : Historical Perspective:\par 89-year-old female comes in for followup after her hospitalization.  The day she went to hospital she didn't eat or drink anything that day. She went shopping with her daughter for about 2 hours. Upon returning to the car she became weak and slouched over against the car and slid down the car and was helped to the floor. According to hospital records she didn't lose consciousness. She was being treated for bronchitis at this time with oral ABx, prednisone and inhaler. At the time BP in the ED was 119/51. She had KRYSTIAN on labs. She was given IVF. She improved clinically. She was discharged on amlodipine 5mg daily. BP at home in the 140-150s, however she remains on prednisone for two more days.\par \par Current Health Status:\par Off PO steroids. Tolerating just amlodipine 5mg daily. BPs have been well controlled. No fatigue, lightheadedness, or syncopal episodes. Staying well hydrated.

## 2019-12-17 NOTE — REVIEW OF SYSTEMS
[Fever] : no fever [Headache] : no headache [Recent Weight Gain (___ Lbs)] : no recent weight gain [Feeling Fatigued] : not feeling fatigued [Chills] : no chills [Recent Weight Loss (___ Lbs)] : no recent weight loss [Shortness Of Breath] : no shortness of breath [Dyspnea on exertion] : dyspnea during exertion [Lower Ext Edema] : no extremity edema [Chest  Pressure] : no chest pressure [Chest Pain] : no chest pain [Palpitations] : no palpitations [Leg Claudication] : no intermittent leg claudication [Abdominal Pain] : no abdominal pain [Vomiting] : no vomiting [Nausea] : no nausea [Heartburn] : heartburn [Change in Appetite] : no change in appetite [Change In The Stool] : no change in stool [Dysphagia] : no dysphagia [see HPI] : see HPI [Tremor] : no tremor was seen [Dizziness] : dizziness [Convulsions] : no convulsions [Numbness (Hypesthesia)] : no numbness [Tingling (Paresthesia)] : no tingling [Negative] : Heme/Lymph

## 2019-12-17 NOTE — PHYSICAL EXAM
[General Appearance - Well Developed] : well developed [Normal Appearance] : normal appearance [Well Groomed] : well groomed [No Deformities] : no deformities [General Appearance - Well Nourished] : well nourished [General Appearance - In No Acute Distress] : no acute distress [Normal Conjunctiva] : the conjunctiva exhibited no abnormalities [No Oral Pallor] : no oral pallor [] : no respiratory distress [Respiration, Rhythm And Depth] : normal respiratory rhythm and effort [Exaggerated Use Of Accessory Muscles For Inspiration] : no accessory muscle use [Auscultation Breath Sounds / Voice Sounds] : lungs were clear to auscultation bilaterally [Heart Sounds] : normal S1 and S2 [Heart Rate And Rhythm] : heart rate and rhythm were normal [Arterial Pulses Normal] : the arterial pulses were normal [Abnormal Walk] : normal gait [Edema] : no peripheral edema present [FreeTextEntry1] : slow gait [Nail Clubbing] : no clubbing of the fingernails [Cyanosis, Localized] : no localized cyanosis [Affect] : the affect was normal [Skin Color & Pigmentation] : normal skin color and pigmentation [Oriented To Time, Place, And Person] : oriented to person, place, and time [Mood] : the mood was normal [No Anxiety] : not feeling anxious

## 2019-12-17 NOTE — HISTORY OF PRESENT ILLNESS
[FreeTextEntry1] : \par PMH as noted below:\par Coronary artery disease status post CABG, which includes LIMA to LAD. 4/2010 [facility]/[place].  Nuclear stress test from 10/17 with no significant perfusion defect. \par \par Essential hypertension:  Well controlled. \par \par History of having gastric ulcer with a GI bleed in December.  s/p transfusions. GI evaluation for PUD, followed with Dr. Ghosh in the past.\par \par Dyslipidemia. Remaining on statin.\par \par Moderate mitral regurgitation. Remaining stable.\par \par Mild pulmonary hypertension.  No CHF\par \par History of depression. improved.\par \par History of syncopal episode.  Implantable loop recorder. No significant findings.\par \par Bilateral moderate carotid stenosis.  Per past note; No significant change when evaluated in April 2015.  Remaining without neurological events \par

## 2020-01-09 ENCOUNTER — APPOINTMENT (OUTPATIENT)
Dept: CARDIOLOGY | Facility: CLINIC | Age: 85
End: 2020-01-09
Payer: MEDICARE

## 2020-01-09 VITALS
DIASTOLIC BLOOD PRESSURE: 62 MMHG | HEIGHT: 65 IN | BODY MASS INDEX: 25.33 KG/M2 | OXYGEN SATURATION: 93 % | SYSTOLIC BLOOD PRESSURE: 122 MMHG | WEIGHT: 152 LBS | HEART RATE: 72 BPM

## 2020-01-09 DIAGNOSIS — I95.1 ORTHOSTATIC HYPOTENSION: ICD-10-CM

## 2020-01-09 DIAGNOSIS — I34.0 NONRHEUMATIC MITRAL (VALVE) INSUFFICIENCY: ICD-10-CM

## 2020-01-09 PROCEDURE — 99214 OFFICE O/P EST MOD 30 MIN: CPT

## 2020-01-09 PROCEDURE — 93306 TTE W/DOPPLER COMPLETE: CPT

## 2020-01-09 NOTE — ASSESSMENT
[FreeTextEntry1] : Past tests for reference.\par cardiac catheterization 1-19-11 distal left main 50%, mid LAD 70% (LIMa-LAD normal), left circ luminal irregularities, mid RCA 90%, 75%, distal RCA 90% (SVG-RCA luminal irregularities), SVG-OM 10%\par \par echo 12/7/15; 60% lse calcified av/mv, mild mr.tr pasp 34 mm of hg.\par \par EKG ordered and interpreted by me on October 12, 2017 indication chest pain. Known coronary artery disease. Interpretation sinus bradycardia. Rightward axis septal infarct. Nonspecific ST-T changes.\par \par echo 12-1-16 ef 60% with normal ventricular function, mild diatolic dysfunction, mild [place]/MR, aneurysmal inter-atrial septum, c/w borderline PHTN pulmonary  pressures 38mmhg, moderate LAE \par \par carotid ultrasound 8-25-16 disease with BERNARDA/CCA ratio is pete due to low CCA velocities. Decrease in LICA/CCA ratio noted. \par \par nuclear stress test 10-13-17 lexiscan equivocal for ischemia by ekg with no evidence of infarction or inducible ischemia.\par \par labs 10-11-17 wbc 8.6, h/h 12.2/36.8, plat 208, Na+ 141, K 4.7, bun/creat 21/1.14\par \par EKG ordered and interpreted by me on March 20 2018. The patient has been interpretation coronary artery disease. In addition to a normal sinus rhythm. Baseline. The progression. No acute ST-T changes.\par \par Reviewed on November 15, 2018.\par Labs from October 24, 2018 were reviewed.\par Echocardiogram November 12, 2018 LV ejection fraction 60%. Mild aortic stenosis. Mild mitral regurgitation.\par Carotid Doppler study November 12, 2018. Nonobstructive disease \par EKG November 15, 2018. Indication coronary artery disease. Interpretation normal sinus rhythm. 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 No significant new changes. \par \par Reviewed on January 9, 2020.\par Echocardiogram January 9, 2001 EF around 60% with mild mitral regurgitation. Mild aortic regurgitation. LVH with pulmonary pressures of 45 mm mercury. A slight increase in pulmonary pressures noted before.\par Labs from January 7, 2020 were reviewed. Hemoglobin 11.0, platelet count 238.\par CMP, stable on January 7, 2020.\par November 2019. Labs had shown stable. Lipid panel.

## 2020-01-09 NOTE — HISTORY OF PRESENT ILLNESS
[FreeTextEntry1] : \par PMH as noted below:\par Coronary artery disease status post CABG, which includes LIMA to LAD. 4/2010 [facility]/[place].  Nuclear stress test from 10/17 with no significant perfusion defect. \par \par Essential hypertension:  Well controlled. \par \par History of having gastric ulcer with a GI bleed in December.  s/p transfusions. GI evaluation for PUD, followed with Dr. Ghosh in the past.\par \par Dyslipidemia. Remaining on statin.\par \par Moderate mitral regurgitation. Remaining stable.\par \par Mild pulmonary hypertension.  No CHF\par \par History of depression. improved.\par \par History of syncopal episode.  Implantable loop recorder. No significant findings.\par \par Bilateral moderate carotid stenosis.  Per past note; No significant change when evaluated in April 2015.  Remaining without neurological events \par

## 2020-01-09 NOTE — PHYSICAL EXAM
[General Appearance - Well Developed] : well developed [Well Groomed] : well groomed [General Appearance - Well Nourished] : well nourished [Normal Appearance] : normal appearance [General Appearance - In No Acute Distress] : no acute distress [No Deformities] : no deformities [Normal Conjunctiva] : the conjunctiva exhibited no abnormalities [No Oral Pallor] : no oral pallor [Normal Jugular Venous A Waves Present] : normal jugular venous A waves present [No Jugular Venous Koehler A Waves] : no jugular venous koehler A waves [Normal Jugular Venous V Waves Present] : normal jugular venous V waves present [Respiration, Rhythm And Depth] : normal respiratory rhythm and effort [] : no respiratory distress [Exaggerated Use Of Accessory Muscles For Inspiration] : no accessory muscle use [Auscultation Breath Sounds / Voice Sounds] : lungs were clear to auscultation bilaterally [Heart Sounds] : normal S1 and S2 [Arterial Pulses Normal] : the arterial pulses were normal [Heart Rate And Rhythm] : heart rate and rhythm were normal [Abnormal Walk] : normal gait [Abdomen Soft] : soft [Nail Clubbing] : no clubbing of the fingernails [Cyanosis, Localized] : no localized cyanosis [Affect] : the affect was normal [Skin Color & Pigmentation] : normal skin color and pigmentation [Oriented To Time, Place, And Person] : oriented to person, place, and time [Mood] : the mood was normal [No Anxiety] : not feeling anxious [Veins - Varicosity Changes] : no varicosital changes were noted in the lower extremities [FreeTextEntry1] : Walks with limp.

## 2020-01-09 NOTE — REASON FOR VISIT
[Follow-Up - Clinic] : a clinic follow-up of [Dizziness] : dizziness [Chest Pain] : chest pain [Coronary Artery Disease] : coronary artery disease [Hypertension] : hypertension [Syncope] : syncope [FreeTextEntry1] : 89-year-old female comes in for followup consultation today view echocardiogram after recent hospitalization for syncope and adjustment of medication. Since then.\par She has been tolerating her amlodipine, well, with mild increased ankle edema. She is off isosorbide mononitrate/valsartan.\par Her blood pressure is stable.\par She has not had any significant dizziness.\par She has no chest pain, PND, orthopnea.\par She has started doing cardiac rehabilitation.\par Her balance is stable and uses intermittent cane\par Her gastroesophageal reflux related symptoms have resolved.\par There is no hemoptysis, hematemesis, melena, or black, tarry stool.\par She has no palpitations or syncopal episode.\par She has no visual disturbances or focal weakness.\par She does not have any increased caffeine or alcohol intake.\par Denies any recent hospital admission

## 2020-01-09 NOTE — REVIEW OF SYSTEMS
[Dyspnea on exertion] : dyspnea during exertion [Heartburn] : heartburn [see HPI] : see HPI [Dizziness] : dizziness [Negative] : Heme/Lymph [Fever] : no fever [Headache] : no headache [Chills] : no chills [Recent Weight Gain (___ Lbs)] : no recent weight gain [Feeling Fatigued] : not feeling fatigued [Recent Weight Loss (___ Lbs)] : no recent weight loss [Chest  Pressure] : no chest pressure [Shortness Of Breath] : no shortness of breath [Chest Pain] : no chest pain [Lower Ext Edema] : lower extremity edema [Leg Claudication] : no intermittent leg claudication [Palpitations] : no palpitations [Nausea] : no nausea [Vomiting] : no vomiting [Abdominal Pain] : no abdominal pain [Change in Appetite] : no change in appetite [Change In The Stool] : no change in stool [Dysphagia] : no dysphagia [Convulsions] : no convulsions [Numbness (Hypesthesia)] : no numbness [Tremor] : no tremor was seen [Tingling (Paresthesia)] : no tingling

## 2020-01-09 NOTE — DISCUSSION/SUMMARY
[FreeTextEntry1] : Assessment and Suggestions:\par - Syncopal event B. orthostatic hypotension. Changing medications have helped her. Today, no significant orthostatic hypotension on my examination.\par There is mild ankle edema noted on amlodipine. Blood pressure is otherwise stable.\par Recommended to continue present medications.\par Leg elevation.\par Low salt diet.\par If any worsening of edema and no dizziness. She is recommended to contact us immediately.\par - chest pain: Resolved. Continue management of ischemic heart disease, and gastroesophageal duplex disease.\par -HTN. Continue present medications.\par -hyperlipidemia on Crestor, labs as above. \par -Non-rheumatic mitral and aortic insufficiency. Concentric LVH B. mild pulmonary attention. Clinically, no signs of left or right heart failure. Continue to follow.\par \par -PVD. Carotid Doppler study. Any significant progression she would warrant further evaluation. Continue lifestyle and risk factor modifications.\par \par She gets her anemia, followed with hematologist\par \par Counseling regarding low saturated fat, low carbohydrate intake was reviewed. Active lifestyle and regular. Exercise is along with weight maintenance is advised.\par I have reviewed above at length. I answered all the questions. Patient verbalized understanding\par Thank you very much for allowing me to participate in your patient's care. Please feel free to call me for any questions.\par \par Followup in 4-6 months

## 2020-04-21 ENCOUNTER — NON-APPOINTMENT (OUTPATIENT)
Age: 85
End: 2020-04-21

## 2020-04-21 ENCOUNTER — APPOINTMENT (OUTPATIENT)
Dept: CARDIOLOGY | Facility: CLINIC | Age: 85
End: 2020-04-21
Payer: MEDICARE

## 2020-04-21 VITALS
RESPIRATION RATE: 16 BRPM | WEIGHT: 154 LBS | OXYGEN SATURATION: 97 % | HEART RATE: 70 BPM | DIASTOLIC BLOOD PRESSURE: 48 MMHG | BODY MASS INDEX: 25.66 KG/M2 | TEMPERATURE: 97.8 F | HEIGHT: 65 IN | SYSTOLIC BLOOD PRESSURE: 104 MMHG

## 2020-04-21 PROCEDURE — 93000 ELECTROCARDIOGRAM COMPLETE: CPT

## 2020-04-21 PROCEDURE — 99214 OFFICE O/P EST MOD 30 MIN: CPT

## 2020-04-21 NOTE — HISTORY OF PRESENT ILLNESS
[FreeTextEntry1] : \par PMH as noted below:\par Coronary artery disease status post CABG, which includes LIMA to LAD. 4/2010 [facility]/[place].  Nuclear stress test from 10/17 with no significant perfusion defect. \par \par Essential hypertension:  Well controlled. \par \par History of having gastric ulcer with a GI bleed in December.  s/p transfusions. GI evaluation for PUD, followed with Dr. Ghosh in the past.\par \par Dyslipidemia. Remaining on statin.\par \par Moderate mitral regurgitation. Remaining stable.\par \par Mild pulmonary hypertension.  No CHF\par \par History of depression. improved.\par \par History of syncopal episode.  Implantable loop recorder. No significant findings.\par \par Bilateral moderate carotid stenosis.  Per past note; No significant change when evaluated in April 2015.  Remaining without neurological events \par

## 2020-04-21 NOTE — DISCUSSION/SUMMARY
[FreeTextEntry1] : Assessment and Suggestions:\par - chest pain: Atypical.  Coronary artery disease.  Status post coronary artery bypass graft surgery more than 5 years ago.  Preserved LV systolic function.  No acute changes on EKG.  Significant history of gastroesophageal reflux disease.  At present in presence of COVID-19 emergency and she does not want to go to the hospital recommended omeprazole to be taken twice a day as in the past it has helped her and nitroglycerin as needed prescription done.\par In presence of history of syncope in the past recommended to unlock the door, lie down and take the medication with the phone with her.  If no improvement in her symptoms or worsening symptoms or any other associated symptoms she will call 911.  She is recommended not to get up quickly for at least 15 minutes to half an hour after taking nitroglycerin.\par She will call me with the progress over the next few days.\par She understands limitation of evaluation and management\par Based on above evaluation will discuss further whether she would benefit from noninvasive ischemic evaluation or not.\par -HTN. Continue present medications.\par -hyperlipidemia on Crestor, labs as above. \par -Non-rheumatic mitral and aortic insufficiency. Concentric LVH. mild pulmonary attention. \par -PVD. Carotid Doppler study. Any significant progression she would warrant further evaluation. Continue lifestyle and risk factor modifications.\par Labs to be followed with hematologist.\par \par Counseling regarding low saturated fat, low carbohydrate intake was reviewed. Active lifestyle and regular. Exercise is along with weight maintenance is advised.\par I have reviewed above at length. I answered all the questions. Patient verbalized understanding\par Thank you very much for allowing me to participate in your patient's care. Please feel free to call me for any questions.\par

## 2020-04-21 NOTE — REASON FOR VISIT
[Acute Exacerbation] : an acute exacerbation of [Chest Pain] : chest pain [Coronary Artery Disease] : coronary artery disease [Dizziness] : dizziness [FreeTextEntry1] : 89-year-old female comes in for urgent evaluation with complaint of substernal chest pressure.  Since morning.  Similar to her reflux disease.  Slightly improved.  2/10.  No radiation.  No associated shortness of breath diaphoresis.  Nonexertional.  There is no associated PND orthopnea or palpitation.\par She has no significant dizziness.\par She has not had any other syncopal or near syncopal event\par She denies any nausea vomiting diarrhea dark-colored stool change in bowel habits\par Her weight is stable\par Her ankle edema is stable\par No recent hospital admission\par  [Syncope] : syncope [Hypertension] : hypertension

## 2020-04-21 NOTE — REVIEW OF SYSTEMS
[Fever] : no fever [Headache] : no headache [Recent Weight Gain (___ Lbs)] : no recent weight gain [Feeling Fatigued] : not feeling fatigued [Chills] : no chills [Shortness Of Breath] : no shortness of breath [Recent Weight Loss (___ Lbs)] : no recent weight loss [Chest Pain] : chest pain [Dyspnea on exertion] : dyspnea during exertion [Chest  Pressure] : no chest pressure [Lower Ext Edema] : lower extremity edema [Leg Claudication] : no intermittent leg claudication [Nausea] : no nausea [Palpitations] : no palpitations [Abdominal Pain] : no abdominal pain [Vomiting] : no vomiting [Heartburn] : heartburn [Change in Appetite] : no change in appetite [Dysphagia] : no dysphagia [Change In The Stool] : no change in stool [see HPI] : see HPI [Numbness (Hypesthesia)] : no numbness [Tremor] : no tremor was seen [Dizziness] : dizziness [Tingling (Paresthesia)] : no tingling [Convulsions] : no convulsions [Negative] : Heme/Lymph

## 2020-04-21 NOTE — ASSESSMENT
[FreeTextEntry1] : Past tests for reference.\par cardiac catheterization 1-19-11 distal left main 50%, mid LAD 70% (LIMa-LAD normal), left circ luminal irregularities, mid RCA 90%, 75%, distal RCA 90% (SVG-RCA luminal irregularities), SVG-OM 10%\par \par echo 12/7/15; 60% lse calcified av/mv, mild mr.tr pasp 34 mm of hg.\par \par EKG ordered and interpreted by me on October 12, 2017 indication chest pain. Known coronary artery disease. Interpretation sinus bradycardia. Rightward axis septal infarct. Nonspecific ST-T changes.\par \par echo 12-1-16 ef 60% with normal ventricular function, mild diatolic dysfunction, mild [place]/MR, aneurysmal inter-atrial septum, c/w borderline PHTN pulmonary  pressures 38mmhg, moderate LAE \par \par carotid ultrasound 8-25-16 disease with BERNARDA/CCA ratio is pete due to low CCA velocities. Decrease in LICA/CCA ratio noted. \par \par nuclear stress test 10-13-17 lexiscan equivocal for ischemia by ekg with no evidence of infarction or inducible ischemia.\par \par labs 10-11-17 wbc 8.6, h/h 12.2/36.8, plat 208, Na+ 141, K 4.7, bun/creat 21/1.14\par \par EKG ordered and interpreted by me on March 20 2018. The patient has been interpretation coronary artery disease. In addition to a normal sinus rhythm. Baseline. The progression. No acute ST-T changes.\par \par Reviewed on November 15, 2018.\par Labs from October 24, 2018 were reviewed.\par Echocardiogram November 12, 2018 LV ejection fraction 60%. Mild aortic stenosis. Mild mitral regurgitation.\par Carotid Doppler study November 12, 2018. Nonobstructive disease \par EKG November 15, 2018. Indication coronary artery disease. Interpretation normal sinus rhythm. 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 No significant new changes. \par \par Reviewed on January 9, 2020.\par Echocardiogram January 9, 2001 EF around 60% with mild mitral regurgitation. Mild aortic regurgitation. LVH with pulmonary pressures of 45 mm mercury. A slight increase in pulmonary pressures noted before.\par Labs from January 7, 2020 were reviewed. Hemoglobin 11.0, platelet count 238.\par CMP, stable on January 7, 2020.\par November 2019. Labs had shown stable. Lipid panel.\par \par Reviewed April 21, 2020\par EKG.  Normal sinus rhythm first-degree AV block anteroseptal infarct of undetermined age.  No new acute ST-T changes.

## 2020-04-21 NOTE — PHYSICAL EXAM
[Normal Appearance] : normal appearance [General Appearance - Well Developed] : well developed [General Appearance - Well Nourished] : well nourished [Well Groomed] : well groomed [No Deformities] : no deformities [General Appearance - In No Acute Distress] : no acute distress [Normal Conjunctiva] : the conjunctiva exhibited no abnormalities [No Oral Pallor] : no oral pallor [Normal Jugular Venous A Waves Present] : normal jugular venous A waves present [No Jugular Venous Koehler A Waves] : no jugular venous koehler A waves [Normal Jugular Venous V Waves Present] : normal jugular venous V waves present [Respiration, Rhythm And Depth] : normal respiratory rhythm and effort [] : no respiratory distress [Exaggerated Use Of Accessory Muscles For Inspiration] : no accessory muscle use [Auscultation Breath Sounds / Voice Sounds] : lungs were clear to auscultation bilaterally [Heart Rate And Rhythm] : heart rate and rhythm were normal [Heart Sounds] : normal S1 and S2 [Arterial Pulses Normal] : the arterial pulses were normal [Veins - Varicosity Changes] : no varicosital changes were noted in the lower extremities [Abdomen Soft] : soft [Abnormal Walk] : normal gait [FreeTextEntry1] : Walks with limp.  [Nail Clubbing] : no clubbing of the fingernails [Skin Color & Pigmentation] : normal skin color and pigmentation [Oriented To Time, Place, And Person] : oriented to person, place, and time [Cyanosis, Localized] : no localized cyanosis [Mood] : the mood was normal [Affect] : the affect was normal [No Anxiety] : not feeling anxious

## 2020-04-24 ENCOUNTER — APPOINTMENT (OUTPATIENT)
Dept: CARDIOLOGY | Facility: CLINIC | Age: 85
End: 2020-04-24
Payer: MEDICARE

## 2020-04-24 ENCOUNTER — NON-APPOINTMENT (OUTPATIENT)
Age: 85
End: 2020-04-24

## 2020-04-24 VITALS
SYSTOLIC BLOOD PRESSURE: 110 MMHG | RESPIRATION RATE: 16 BRPM | BODY MASS INDEX: 25.66 KG/M2 | DIASTOLIC BLOOD PRESSURE: 60 MMHG | HEART RATE: 70 BPM | HEIGHT: 65 IN | WEIGHT: 154 LBS

## 2020-04-24 PROCEDURE — 99214 OFFICE O/P EST MOD 30 MIN: CPT

## 2020-04-24 PROCEDURE — 93000 ELECTROCARDIOGRAM COMPLETE: CPT

## 2020-04-24 NOTE — REASON FOR VISIT
[Acute Exacerbation] : an acute exacerbation of [Chest Pain] : chest pain [Coronary Artery Disease] : coronary artery disease [Dizziness] : dizziness [Syncope] : syncope [Hypertension] : hypertension [FreeTextEntry1] : 89-year-old female comes in for urgent evaluation with complaint of substernal chest pressure.  Since morning.  Similar to her reflux disease. .  No radiation.  No associated shortness of breath diaphoresis.  Nonexertional.  There is no associated PND orthopnea or palpitation.  She took nitroglycerin x3 with some improvement.  Her symptoms were better also by taking omeprazole twice a day which she did not take it yesterday night.\par She has no significant dizziness.\par She has not had any other syncopal or near syncopal event\par She denies any nausea vomiting diarrhea dark-colored stool change in bowel habits\par Her weight is stable\par Her ankle edema is stable\par No recent hospital admission\par She again declined to go to the emergency room.  Understands I cannot do blood test to evaluate for coronary artery disease and ischemia related to that.  She insisted on seeing me today.  She understands limitations.  She understands missing diagnosis and associated morbidity mortality.  She states she takes all the responsibilities.

## 2020-04-24 NOTE — HISTORY OF PRESENT ILLNESS
[FreeTextEntry1] : \par PMH as noted below:\par Coronary artery disease status post CABG, which includes LIMA to LAD. 4/2010 [facility]/[place].  Nuclear stress test from 10/17 with no significant perfusion defect. \par \par Essential hypertension:  Well controlled. \par \par History of having gastric ulcer with a GI bleed in December.  s/p transfusions. GI evaluation for PUD, followed with Dr. Ghosh in the past.\par \par Dyslipidemia. Remaining on statin.\par \par Moderate mitral regurgitation. Remaining stable.\par \par Mild pulmonary hypertension.  No CHF\par \par History of depression. improved.\par \par History of syncopal episode.  Implantable loop recorder. No significant findings.\par \par Bilateral moderate carotid stenosis.  Per past note; No significant change when evaluated in April 2015.  Remaining without neurological events \par

## 2020-04-24 NOTE — REVIEW OF SYSTEMS
[Dyspnea on exertion] : dyspnea during exertion [Chest Pain] : chest pain [Lower Ext Edema] : lower extremity edema [Heartburn] : heartburn [Dizziness] : dizziness [see HPI] : see HPI [Negative] : Heme/Lymph [Fever] : no fever [Headache] : no headache [Recent Weight Gain (___ Lbs)] : no recent weight gain [Chills] : no chills [Feeling Fatigued] : not feeling fatigued [Recent Weight Loss (___ Lbs)] : no recent weight loss [Shortness Of Breath] : no shortness of breath [Chest  Pressure] : no chest pressure [Leg Claudication] : no intermittent leg claudication [Palpitations] : no palpitations [Abdominal Pain] : no abdominal pain [Nausea] : no nausea [Vomiting] : no vomiting [Change in Appetite] : no change in appetite [Change In The Stool] : no change in stool [Dysphagia] : no dysphagia [Tremor] : no tremor was seen [Numbness (Hypesthesia)] : no numbness [Convulsions] : no convulsions [Tingling (Paresthesia)] : no tingling

## 2020-04-24 NOTE — PHYSICAL EXAM
[Normal Appearance] : normal appearance [General Appearance - Well Developed] : well developed [General Appearance - Well Nourished] : well nourished [Well Groomed] : well groomed [No Deformities] : no deformities [General Appearance - In No Acute Distress] : no acute distress [Normal Conjunctiva] : the conjunctiva exhibited no abnormalities [No Oral Pallor] : no oral pallor [Normal Jugular Venous A Waves Present] : normal jugular venous A waves present [Normal Jugular Venous V Waves Present] : normal jugular venous V waves present [No Jugular Venous Koehler A Waves] : no jugular venous koehler A waves [Respiration, Rhythm And Depth] : normal respiratory rhythm and effort [] : no respiratory distress [Exaggerated Use Of Accessory Muscles For Inspiration] : no accessory muscle use [Auscultation Breath Sounds / Voice Sounds] : lungs were clear to auscultation bilaterally [Heart Rate And Rhythm] : heart rate and rhythm were normal [Arterial Pulses Normal] : the arterial pulses were normal [Heart Sounds] : normal S1 and S2 [Abdomen Soft] : soft [Veins - Varicosity Changes] : no varicosital changes were noted in the lower extremities [Abnormal Walk] : normal gait [Cyanosis, Localized] : no localized cyanosis [Nail Clubbing] : no clubbing of the fingernails [Skin Color & Pigmentation] : normal skin color and pigmentation [Oriented To Time, Place, And Person] : oriented to person, place, and time [Affect] : the affect was normal [No Anxiety] : not feeling anxious [Mood] : the mood was normal [FreeTextEntry1] : Walks with limp.

## 2020-04-24 NOTE — DISCUSSION/SUMMARY
[FreeTextEntry1] : Assessment and Suggestions:\par - chest pain: Atypical.  Coronary artery disease.  Status post coronary artery bypass graft surgery more than 5 years ago.  Preserved LV systolic function.  No acute changes on EKG.  Significant history of gastroesophageal reflux disease.  At present in presence of COVID-19 emergency and she does not want to go to the hospital recommended omeprazole to be taken twice a day as in the past it has helped her and nitroglycerin as needed prescription done.\par Pepcid 20 mg twice daily added\par In presence of history of syncope in the past recommended to unlock the door, lie down and take the medication with the phone with her.  If no improvement in her symptoms or worsening symptoms or any other associated symptoms she will call 911.  She is recommended not to get up quickly for at least 15 minutes to half an hour after taking nitroglycerin.\par She will call 911 for any recurrent or persistent symptoms as she understands that management of ischemic heart disease is very limited in case of acute problem in the office setting.\par She understands limitation of evaluation and management\par She also needs blood test to rule out anemia.\par -HTN. Continue present medications.\par -hyperlipidemia on Crestor, labs as above. \par -Non-rheumatic mitral and aortic insufficiency. Concentric LVH. mild pulmonary attention. \par -PVD. Carotid Doppler study. Any significant progression she would warrant further evaluation. Continue lifestyle and risk factor modifications.\par Labs to be followed with hematologist.\par \par Counseling regarding low saturated fat, low carbohydrate intake was reviewed. Active lifestyle and regular. Exercise is along with weight maintenance is advised.\par I have reviewed above at length. I answered all the questions. Patient verbalized understanding\par Thank you very much for allowing me to participate in your patient's care. Please feel free to call me for any questions.\par

## 2020-04-24 NOTE — ASSESSMENT
[FreeTextEntry1] : Past tests for reference.\par cardiac catheterization 1-19-11 distal left main 50%, mid LAD 70% (LIMa-LAD normal), left circ luminal irregularities, mid RCA 90%, 75%, distal RCA 90% (SVG-RCA luminal irregularities), SVG-OM 10%\par \par echo 12/7/15; 60% lse calcified av/mv, mild mr.tr pasp 34 mm of hg.\par \par EKG ordered and interpreted by me on October 12, 2017 indication chest pain. Known coronary artery disease. Interpretation sinus bradycardia. Rightward axis septal infarct. Nonspecific ST-T changes.\par \par echo 12-1-16 ef 60% with normal ventricular function, mild diatolic dysfunction, mild VA/MR, aneurysmal inter-atrial septum, c/w borderline PHTN pulmonary  pressures 38mmhg, moderate LAE \par \par carotid ultrasound 8-25-16 disease with BERNARDA/CCA ratio is pete due to low CCA velocities. Decrease in LICA/CCA ratio noted. \par \par nuclear stress test 10-13-17 lexiscan equivocal for ischemia by ekg with no evidence of infarction or inducible ischemia.\par \par labs 10-11-17 wbc 8.6, h/h 12.2/36.8, plat 208, Na+ 141, K 4.7, bun/creat 21/1.14\par \par EKG ordered and interpreted by me on March 20 2018. The patient has been interpretation coronary artery disease. In addition to a normal sinus rhythm. Baseline. The progression. No acute ST-T changes.\par \par Reviewed on November 15, 2018.\par Labs from October 24, 2018 were reviewed.\par Echocardiogram November 12, 2018 LV ejection fraction 60%. Mild aortic stenosis. Mild mitral regurgitation.\par Carotid Doppler study November 12, 2018. Nonobstructive disease \par EKG November 15, 2018. Indication coronary artery disease. Interpretation normal sinus rhythm. 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 No significant new changes. \par \par Reviewed on January 9, 2020.\par Echocardiogram January 9, 2001 EF around 60% with mild mitral regurgitation. Mild aortic regurgitation. LVH with pulmonary pressures of 45 mm mercury. A slight increase in pulmonary pressures noted before.\par Labs from January 7, 2020 were reviewed. Hemoglobin 11.0, platelet count 238.\par CMP, stable on January 7, 2020.\par November 2019. Labs had shown stable. Lipid panel.\par \par Reviewed April 21, 2020\par EKG.  Normal sinus rhythm first-degree AV block anteroseptal infarct of undetermined age.  No new acute ST-T changes.\par EKG April 24, 2020\par Normal sinus rhythm anteroseptal infarct of undetermined age.  No new acute changes.

## 2020-07-27 ENCOUNTER — APPOINTMENT (OUTPATIENT)
Dept: CARDIOLOGY | Facility: CLINIC | Age: 85
End: 2020-07-27

## 2020-07-28 ENCOUNTER — APPOINTMENT (OUTPATIENT)
Dept: CARDIOLOGY | Facility: CLINIC | Age: 85
End: 2020-07-28
Payer: MEDICARE

## 2020-07-28 VITALS
HEIGHT: 65 IN | OXYGEN SATURATION: 98 % | DIASTOLIC BLOOD PRESSURE: 60 MMHG | SYSTOLIC BLOOD PRESSURE: 124 MMHG | BODY MASS INDEX: 25.33 KG/M2 | HEART RATE: 68 BPM | WEIGHT: 152 LBS

## 2020-07-28 DIAGNOSIS — R60.0 LOCALIZED EDEMA: ICD-10-CM

## 2020-07-28 PROCEDURE — 99214 OFFICE O/P EST MOD 30 MIN: CPT

## 2020-07-28 RX ORDER — FAMOTIDINE 20 MG/1
20 TABLET, FILM COATED ORAL
Refills: 0 | Status: DISCONTINUED | COMMUNITY
Start: 2020-04-24 | End: 2020-07-28

## 2020-07-28 NOTE — REASON FOR VISIT
[Follow-Up - Clinic] : a clinic follow-up of [Chest Pain] : chest pain [Coronary Artery Disease] : coronary artery disease [Dizziness] : dizziness [Hypertension] : hypertension [Syncope] : syncope [FreeTextEntry1] : 89-year-old female comes in for followup consultation today since last seen she had iron infusion for anemia.  She does have a slightly increased ankle edema according to her taking amlodipine at 5 mg.  To review her labs and medical management.\par . She is off isosorbide mononitrate/valsartan.\par Her blood pressure is stable.\par She has not had any significant dizziness.\par She has no chest pain, PND, orthopnea.\par Her balance is stable and uses intermittent cane\par Her gastroesophageal reflux related symptoms have resolved.\par There is no hemoptysis, hematemesis, melena, or black, tarry stool.\par She has no palpitations or syncopal episode.\par She has no visual disturbances or focal weakness.\par She does not have any increased caffeine or alcohol intake.\par Denies any recent hospital admission

## 2020-07-28 NOTE — ASSESSMENT
[FreeTextEntry1] : Past tests for reference.\par cardiac catheterization 1-19-11 distal left main 50%, mid LAD 70% (LIMa-LAD normal), left circ luminal irregularities, mid RCA 90%, 75%, distal RCA 90% (SVG-RCA luminal irregularities), SVG-OM 10%\par \par echo 12/7/15; 60% lse calcified av/mv, mild mr.tr pasp 34 mm of hg.\par \par EKG ordered and interpreted by me on October 12, 2017 indication chest pain. Known coronary artery disease. Interpretation sinus bradycardia. Rightward axis septal infarct. Nonspecific ST-T changes.\par \par echo 12-1-16 ef 60% with normal ventricular function, mild diatolic dysfunction, mild OK/MR, aneurysmal inter-atrial septum, c/w borderline PHTN pulmonary  pressures 38mmhg, moderate LAE \par \par carotid ultrasound 8-25-16 disease with BERNARDA/CCA ratio is pete due to low CCA velocities. Decrease in LICA/CCA ratio noted. \par \par nuclear stress test 10-13-17 lexiscan equivocal for ischemia by ekg with no evidence of infarction or inducible ischemia.\par \par labs 10-11-17 wbc 8.6, h/h 12.2/36.8, plat 208, Na+ 141, K 4.7, bun/creat 21/1.14\par \par EKG ordered and interpreted by me on March 20 2018. The patient has been interpretation coronary artery disease. In addition to a normal sinus rhythm. Baseline. The progression. No acute ST-T changes.\par \par Reviewed on November 15, 2018.\par Labs from October 24, 2018 were reviewed.\par Echocardiogram November 12, 2018 LV ejection fraction 60%. Mild aortic stenosis. Mild mitral regurgitation.\par Carotid Doppler study November 12, 2018. Nonobstructive disease \par EKG November 15, 2018. Indication coronary artery disease. Interpretation normal sinus rhythm. 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 No significant new changes. \par \par Reviewed on January 9, 2020.\par Echocardiogram January 9, 2001 EF around 60% with mild mitral regurgitation. Mild aortic regurgitation. LVH with pulmonary pressures of 45 mm mercury. A slight increase in pulmonary pressures noted before.\par Labs from January 7, 2020 were reviewed. Hemoglobin 11.0, platelet count 238.\par CMP, stable on January 7, 2020.\par November 2019. Labs had shown stable. Lipid panel.\par \par Reviewed July 28, 2020\par Labs from January 7, 2020 and May 28, 2020 were reviewed.\par

## 2020-07-28 NOTE — PHYSICAL EXAM
[General Appearance - Well Developed] : well developed [Well Groomed] : well groomed [Normal Appearance] : normal appearance [General Appearance - Well Nourished] : well nourished [No Deformities] : no deformities [Normal Conjunctiva] : the conjunctiva exhibited no abnormalities [General Appearance - In No Acute Distress] : no acute distress [No Oral Pallor] : no oral pallor [Normal Jugular Venous A Waves Present] : normal jugular venous A waves present [Normal Jugular Venous V Waves Present] : normal jugular venous V waves present [No Jugular Venous Koehler A Waves] : no jugular venous koehler A waves [Respiration, Rhythm And Depth] : normal respiratory rhythm and effort [] : no respiratory distress [Exaggerated Use Of Accessory Muscles For Inspiration] : no accessory muscle use [Auscultation Breath Sounds / Voice Sounds] : lungs were clear to auscultation bilaterally [Heart Rate And Rhythm] : heart rate and rhythm were normal [Arterial Pulses Normal] : the arterial pulses were normal [Heart Sounds] : normal S1 and S2 [Veins - Varicosity Changes] : no varicosital changes were noted in the lower extremities [Abdomen Soft] : soft [Abnormal Walk] : normal gait [Cyanosis, Localized] : no localized cyanosis [Nail Clubbing] : no clubbing of the fingernails [Skin Color & Pigmentation] : normal skin color and pigmentation [Oriented To Time, Place, And Person] : oriented to person, place, and time [Mood] : the mood was normal [Affect] : the affect was normal [No Anxiety] : not feeling anxious [FreeTextEntry1] : Part 2/6 left breast region. trace edema

## 2020-07-28 NOTE — DISCUSSION/SUMMARY
[FreeTextEntry1] : Assessment and Suggestions:\par -Orthostasis was not noted today.  Stable blood pressure.  No further syncopal event.  Continue hydration to avoid electrolytes that she has been taking considering ankle edema.  Recommended fluid intake in the form of water.  Reviewed 40 to 50 ounces a day.  Avoid quick changes in position.  Compression stockings during winter months.\par - chest pain: Stable.  Mainly associated with gastroesophageal reflux disease.  Continue management of ischemic heart disease, and gastroesophageal duplex disease.\par -HTN.  In presence of ankle edema.  Which most likely related to venous insufficiency rather than congestive heart failure.  She will have an echocardiogram in presence of her age, history of mitral and aortic insufficiency, coronary artery disease with CABG and hypertension.  I have also decreased her amlodipine to 2.5 mg considering very stable blood pressure with intermittent orthostasis and dizziness.  She will call me if her blood pressure is remaining elevated or continued symptoms.\par -hyperlipidemia on Crestor, follow labs.\par -Non-rheumatic mitral and aortic insufficiency. Concentric LVH . mild pulmonary attention. Clinically, no signs of left or right heart failure. Continue to follow echo\par -PVD. Carotid Doppler study. Any significant progression she would warrant further evaluation. Continue lifestyle and risk factor modifications.\par - She gets her anemia, followed with hematologist\par \par Counseling regarding low saturated fat, low carbohydrate intake was reviewed. Active lifestyle and regular. Exercise is along with weight maintenance is advised.\par I have reviewed above at length. I answered all the questions. Patient verbalized understanding\par Thank you very much for allowing me to participate in your patient's care. Please feel free to call me for any questions.\par \par Followup in 4-6 months

## 2020-07-28 NOTE — REVIEW OF SYSTEMS
[Dyspnea on exertion] : dyspnea during exertion [Lower Ext Edema] : lower extremity edema [Heartburn] : heartburn [see HPI] : see HPI [Dizziness] : dizziness [Negative] : Endocrine [Fever] : no fever [Headache] : no headache [Chills] : no chills [Recent Weight Gain (___ Lbs)] : no recent weight gain [Feeling Fatigued] : not feeling fatigued [Recent Weight Loss (___ Lbs)] : no recent weight loss [Shortness Of Breath] : no shortness of breath [Chest  Pressure] : no chest pressure [Chest Pain] : no chest pain [Leg Claudication] : no intermittent leg claudication [Palpitations] : no palpitations [Abdominal Pain] : no abdominal pain [Vomiting] : no vomiting [Nausea] : no nausea [Change In The Stool] : no change in stool [Change in Appetite] : no change in appetite [Dysphagia] : no dysphagia [Tremor] : no tremor was seen [Numbness (Hypesthesia)] : no numbness [Tingling (Paresthesia)] : no tingling [Convulsions] : no convulsions

## 2020-07-28 NOTE — CARDIOLOGY SUMMARY
[___] : [unfilled] [Enlarged] : enlarged LA size [LVEF ___%] : LVEF [unfilled]% [Mild] : mild pulmonary hypertension

## 2020-07-28 NOTE — HISTORY OF PRESENT ILLNESS
[FreeTextEntry1] : \par PMH as noted below:\par Coronary artery disease status post CABG, which includes LIMA to LAD. 4/2010 [facility].  Nuclear stress test from 10/17 with no significant perfusion defect. \par \par Essential hypertension:  Well controlled. \par \par History of having gastric ulcer with a GI bleed in December.  s/p transfusions. GI evaluation for PUD, followed with Dr. Ghosh in the past.\par \par Dyslipidemia. Remaining on statin.\par \par Moderate mitral regurgitation. Remaining stable.\par \par Mild pulmonary hypertension.  No CHF\par \par History of depression. improved.\par \par History of syncopal episode.  Implantable loop recorder. No significant findings.\par \par Bilateral moderate carotid stenosis.  Per past note; No significant change when evaluated in April 2015.  Remaining without neurological events \par

## 2020-12-18 ENCOUNTER — APPOINTMENT (OUTPATIENT)
Dept: CARDIOLOGY | Facility: CLINIC | Age: 85
End: 2020-12-18

## 2021-04-26 ENCOUNTER — NON-APPOINTMENT (OUTPATIENT)
Age: 86
End: 2021-04-26

## 2021-06-07 ENCOUNTER — APPOINTMENT (OUTPATIENT)
Dept: CARDIOLOGY | Facility: CLINIC | Age: 86
End: 2021-06-07
Payer: MEDICARE

## 2021-06-07 ENCOUNTER — NON-APPOINTMENT (OUTPATIENT)
Age: 86
End: 2021-06-07

## 2021-06-07 VITALS
HEART RATE: 64 BPM | WEIGHT: 154 LBS | HEIGHT: 65 IN | DIASTOLIC BLOOD PRESSURE: 62 MMHG | SYSTOLIC BLOOD PRESSURE: 128 MMHG | OXYGEN SATURATION: 95 % | BODY MASS INDEX: 25.66 KG/M2

## 2021-06-07 DIAGNOSIS — Z95.1 PRESENCE OF AORTOCORONARY BYPASS GRAFT: ICD-10-CM

## 2021-06-07 PROCEDURE — 99214 OFFICE O/P EST MOD 30 MIN: CPT

## 2021-06-07 PROCEDURE — 93000 ELECTROCARDIOGRAM COMPLETE: CPT

## 2021-06-07 RX ORDER — ASPIRIN ENTERIC COATED TABLETS 81 MG 81 MG/1
81 TABLET, DELAYED RELEASE ORAL DAILY
Qty: 90 | Refills: 0 | Status: DISCONTINUED | COMMUNITY
Start: 2018-03-20 | End: 2021-06-07

## 2021-06-07 RX ORDER — CHROMIUM 200 MCG
1000 TABLET ORAL DAILY
Refills: 0 | Status: DISCONTINUED | COMMUNITY
End: 2021-06-07

## 2021-06-07 RX ORDER — ASPIRIN 325 MG/1
325 TABLET, COATED ORAL DAILY
Refills: 0 | Status: DISCONTINUED | COMMUNITY
End: 2021-06-07

## 2021-06-07 RX ORDER — AMLODIPINE BESYLATE 2.5 MG/1
2.5 TABLET ORAL DAILY
Qty: 90 | Refills: 3 | Status: DISCONTINUED | COMMUNITY
End: 2021-06-07

## 2021-06-07 NOTE — REVIEW OF SYSTEMS
What Is The Reason For Today's Visit?: Full Body Skin Examination What Is The Reason For Today's Visit? (Being Monitored For X): the development of new lesions [Feeling Fatigued] : feeling fatigued [SOB] : no shortness of breath [Dyspnea on exertion] : not dyspnea during exertion [Chest Discomfort] : chest discomfort [Lower Ext Edema] : no extremity edema [Leg Claudication] : no intermittent leg claudication [Palpitations] : no palpitations [Orthopnea] : no orthopnea [PND] : no PND [Joint Pain] : joint pain [Syncope] : no syncope [Joint Swelling] : joint swelling [Joint Stiffness] : joint stiffness [Negative] : Heme/Lymph

## 2021-06-07 NOTE — REASON FOR VISIT
[Symptom and Test Evaluation] : symptom and test evaluation [Hyperlipidemia] : hyperlipidemia [FreeTextEntry1] : 89-year-old female was seen in the follow-up consultation.  Labs were reviewed.  Mild edema.  Stable.  Without any chest pain shortness of breath PND orthopnea.  No significant palpitation.  Persistent nature.  Aggravated by standing for longer  Time.\par Gait and balance abnormality but no fall.  Uses cane.\par Her blood pressure is stable.\par She has not had any significant dizziness.\par She has occasional substernal chest pain.  Mainly at nighttime after eating and lying down.  None with activity and exercise.  She is very active at 89.  She does 20 minutes of walking on a treadmill on a daily basis at home.\par Her gastroesophageal reflux related symptoms are stable\par There is no hemoptysis, hematemesis, melena, or black, tarry stool.\par She has no palpitations or syncopal episode.\par She has no visual disturbances or focal weakness.\par She does not have any increased caffeine or alcohol intake.\par Denies any recent hospital admission [Follow-Up - Clinic] : a clinic follow-up of [Chest Pain] : chest pain [Coronary Artery Disease] : coronary artery disease [Dizziness] : dizziness [Hypertension] : hypertension [Syncope] : syncope

## 2021-06-07 NOTE — ASSESSMENT
[FreeTextEntry1] : Past tests for reference.\par cardiac catheterization 1-19-11 distal left main 50%, mid LAD 70% (LIMa-LAD normal), left circ luminal irregularities, mid RCA 90%, 75%, distal RCA 90% (SVG-RCA luminal irregularities), SVG-OM 10%\par \par echo 12/7/15; 60% lse calcified av/mv, mild mr.tr pasp 34 mm of hg.\par \par EKG ordered and interpreted by me on October 12, 2017 indication chest pain. Known coronary artery disease. Interpretation sinus bradycardia. Rightward axis septal infarct. Nonspecific ST-T changes.\par \par echo 12-1-16 ef 60% with normal ventricular function, mild diatolic dysfunction, mild AR/MR, aneurysmal inter-atrial septum, c/w borderline PHTN pulmonary  pressures 38mmhg, moderate LAE \par \par carotid ultrasound 8-25-16 disease with BERNARDA/CCA ratio is pete due to low CCA velocities. Decrease in LICA/CCA ratio noted. \par \par nuclear stress test 10-13-17 lexiscan equivocal for ischemia by ekg with no evidence of infarction or inducible ischemia.\par \par labs 10-11-17 wbc 8.6, h/h 12.2/36.8, plat 208, Na+ 141, K 4.7, bun/creat 21/1.14\par \par EKG ordered and interpreted by me on March 20 2018. The patient has been interpretation coronary artery disease. In addition to a normal sinus rhythm. Baseline. The progression. No acute ST-T changes.\par \par Reviewed on November 15, 2018.\par Labs from October 24, 2018 were reviewed.\par Echocardiogram November 12, 2018 LV ejection fraction 60%. Mild aortic stenosis. Mild mitral regurgitation.\par Carotid Doppler study November 12, 2018. Nonobstructive disease \par EKG November 15, 2018. Indication coronary artery disease. Interpretation normal sinus rhythm. 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 No significant new changes. \par \par Reviewed on January 9, 2020.\par Echocardiogram January 9, 2001 EF around 60% with mild mitral regurgitation. Mild aortic regurgitation. LVH with pulmonary pressures of 45 mm mercury. A slight increase in pulmonary pressures noted before.\par Labs from January 7, 2020 were reviewed. Hemoglobin 11.0, platelet count 238.\par CMP, stable on January 7, 2020.\par November 2019. Labs had shown stable. Lipid panel.\par \par Reviewed July 28, 2020\par Labs from January 7, 2020 and May 28, 2020 were reviewed.\par \par EKG normal sinus rhythm low voltage poor R wave progression\par Labs from April 2021 were reviewed excellent lipid panel otherwise stable labs elevated white count\par

## 2021-06-07 NOTE — DISCUSSION/SUMMARY
[FreeTextEntry1] : Assessment and Suggestions:\par 89-year-old female with above medical history and active medical problems as noted below\par -Leukocytosis and anemia being followed with hematologist\par - chest pain: Stable.  Mainly associated with gastroesophageal reflux disease.  Continue management of ischemic heart disease, and gastroesophageal duplex disease.\par Coronary artery disease.  CABG.  Decrease aspirin to 81 mg.  Vesicular with gastroesophageal reflux disease.\par -HTN.  In presence of ankle edema.  Which most likely related to venous insufficiency rather than congestive heart failure.  She will have an echocardiogram in presence of her age, history of mitral and aortic insufficiency, coronary artery disease with CABG and hypertension. \par -hyperlipidemia on Crestor, follow labs.\par -Non-rheumatic mitral and aortic insufficiency. Concentric LVH . mild pulmonary attention. Clinically, no signs of left or right heart failure. Continue to follow echo\par -PVD. Carotid Doppler study. Any significant progression she would warrant further evaluation. Continue lifestyle and risk factor modifications.\par \par \par Counseling regarding low saturated fat, low carbohydrate intake was reviewed. Active lifestyle and regular. Exercise is along with weight maintenance is advised.\par I have reviewed above at length. I answered all the questions. Patient verbalized understanding\par Thank you very much for allowing me to participate in your patient's care. Please feel free to call me for any questions.\par \par Followup in 4-6 months

## 2021-06-07 NOTE — PHYSICAL EXAM
[General Appearance - Well Developed] : well developed [Normal Appearance] : normal appearance [Well Groomed] : well groomed [General Appearance - Well Nourished] : well nourished [No Deformities] : no deformities [General Appearance - In No Acute Distress] : no acute distress [Normal Conjunctiva] : the conjunctiva exhibited no abnormalities [No Oral Pallor] : no oral pallor [Normal Jugular Venous A Waves Present] : normal jugular venous A waves present [Normal Jugular Venous V Waves Present] : normal jugular venous V waves present [No Jugular Venous Koehler A Waves] : no jugular venous koehler A waves [] : no respiratory distress [Respiration, Rhythm And Depth] : normal respiratory rhythm and effort [Exaggerated Use Of Accessory Muscles For Inspiration] : no accessory muscle use [Auscultation Breath Sounds / Voice Sounds] : lungs were clear to auscultation bilaterally [Heart Rate And Rhythm] : heart rate and rhythm were normal [Heart Sounds] : normal S1 and S2 [Arterial Pulses Normal] : the arterial pulses were normal [Veins - Varicosity Changes] : no varicosital changes were noted in the lower extremities [Abdomen Soft] : soft [Abnormal Walk] : normal gait [FreeTextEntry1] : Walks with limp.  [Nail Clubbing] : no clubbing of the fingernails [Cyanosis, Localized] : no localized cyanosis [Oriented To Time, Place, And Person] : oriented to person, place, and time [Skin Color & Pigmentation] : normal skin color and pigmentation [Affect] : the affect was normal [Mood] : the mood was normal [No Anxiety] : not feeling anxious

## 2021-07-19 ENCOUNTER — RX RENEWAL (OUTPATIENT)
Age: 86
End: 2021-07-19

## 2021-09-16 ENCOUNTER — NON-APPOINTMENT (OUTPATIENT)
Age: 86
End: 2021-09-16

## 2021-09-20 ENCOUNTER — APPOINTMENT (OUTPATIENT)
Dept: CARDIOLOGY | Facility: CLINIC | Age: 86
End: 2021-09-20
Payer: MEDICARE

## 2021-09-20 VITALS
DIASTOLIC BLOOD PRESSURE: 60 MMHG | SYSTOLIC BLOOD PRESSURE: 120 MMHG | WEIGHT: 152 LBS | HEIGHT: 65 IN | OXYGEN SATURATION: 95 % | BODY MASS INDEX: 25.33 KG/M2 | HEART RATE: 64 BPM

## 2021-09-20 PROCEDURE — 99214 OFFICE O/P EST MOD 30 MIN: CPT

## 2021-09-20 RX ORDER — SERTRALINE HYDROCHLORIDE 50 MG/1
50 TABLET, FILM COATED ORAL DAILY
Qty: 90 | Refills: 1 | Status: DISCONTINUED | COMMUNITY
End: 2021-09-20

## 2021-09-20 RX ORDER — ROSUVASTATIN CALCIUM 10 MG/1
10 TABLET, FILM COATED ORAL
Qty: 30 | Refills: 0 | Status: DISCONTINUED | COMMUNITY
Start: 1900-01-01 | End: 2021-09-20

## 2021-09-20 NOTE — DISCUSSION/SUMMARY
[FreeTextEntry1] : BOOM HOLGUIN  is a 89 year F  who presents today Sep 20, 2021 with the above history and the following active issues. \par \par -Leukocytosis and anemia being followed with hematologist. Recent blood work reviewed. \par \par - Gastroesophageal reflux disease.  Continue to follow-up with GI. \par \par - Coronary artery disease status post CABG.  Continue ASA 81 mg.  \par \par -Hypertension. Blood pressure well controlled on my examination 120/60. Intermittent episodes of lightheadedness. Recommend trial of holding Amlodipine. Return to the office in 2 weeks with home blood pressure cuff to assess for accuracy. Low Sodium diet. \par \par -hyperlipidemia on Crestor, follow labs.\par Lifestyle and risk factor modification.\par \par -Non-rheumatic mitral and aortic insufficiency. Concentric LVH . mild pulmonary attention. Clinically, no signs of left or right heart failure. Continue to follow echo\par \par -PVD. Carotid Doppler study. Any significant progression she would warrant further evaluation. Continue lifestyle and risk factor modifications.\par \par Red flag symptoms which would warrant sooner emergent evaluation reviewed with the patient. \par Questions and concerns were addressed and answered. \par \par Sincerely,\par \par Ema Slater PA-C\par Patients history, testing and plan reviewed with supervising MD: Dr. Harpal Locke

## 2021-09-20 NOTE — HISTORY OF PRESENT ILLNESS
[FreeTextEntry1] : BOOM HOLGUIN  is a 89 year F  who presents today Sep 20, 2021 in clinical follow-up and for reassessment of blood pressure. She is concerned regarding taking Amlodipine 2.5mg because there are episodes of occasional lightheadedness. No recent fall. Walking with cane. Feeling very well and with a good amount of energy. \par \par Today she denies chest pain, pressure, unusual shortness of breath, lightheadedness, dizziness, near syncope or syncope.\par  \par PMH as noted below:\par Coronary artery disease status post CABG, which includes LIMA to LAD. 4/2010 [facility].  Nuclear stress test from 10/17 with no significant perfusion defect. \par \par Essential hypertension:  Well controlled. \par \par History of having gastric ulcer with a GI bleed in December.  s/p transfusions. GI evaluation for PUD, followed with Dr. Ghosh in the past.\par \par Dyslipidemia. Remaining on statin.\par \par Moderate mitral regurgitation. Remaining stable.\par \par Mild pulmonary hypertension.  No CHF\par \par History of depression. improved.\par \par History of syncopal episode.  Implantable loop recorder. No significant findings.\par \par Bilateral moderate carotid stenosis.  Per past note; No significant change when evaluated in April 2015.  Remaining without neurological events \par

## 2021-09-20 NOTE — REVIEW OF SYSTEMS
[Chest Discomfort] : chest discomfort [Joint Pain] : joint pain [Joint Swelling] : joint swelling [Joint Stiffness] : joint stiffness [Negative] : Heme/Lymph [Feeling Fatigued] : not feeling fatigued [SOB] : no shortness of breath [Dyspnea on exertion] : not dyspnea during exertion [Lower Ext Edema] : no extremity edema [Leg Claudication] : no intermittent leg claudication [Palpitations] : no palpitations [Orthopnea] : no orthopnea [PND] : no PND [Syncope] : no syncope

## 2021-09-20 NOTE — ASSESSMENT
[FreeTextEntry1] : Past tests for reference.\par cardiac catheterization 1-19-11 distal left main 50%, mid LAD 70% (LIMa-LAD normal), left circ luminal irregularities, mid RCA 90%, 75%, distal RCA 90% (SVG-RCA luminal irregularities), SVG-OM 10%\par \par echo 12/7/15; 60% lse calcified av/mv, mild mr.tr pasp 34 mm of hg.\par \par EKG ordered and interpreted by me on October 12, 2017 indication chest pain. Known coronary artery disease. Interpretation sinus bradycardia. Rightward axis septal infarct. Nonspecific ST-T changes.\par \par echo 12-1-16 ef 60% with normal ventricular function, mild diatolic dysfunction, mild CO/MR, aneurysmal inter-atrial septum, c/w borderline PHTN pulmonary  pressures 38mmhg, moderate LAE \par \par carotid ultrasound 8-25-16 disease with BERNARDA/CCA ratio is pete due to low CCA velocities. Decrease in LICA/CCA ratio noted. \par \par nuclear stress test 10-13-17 lexiscan equivocal for ischemia by ekg with no evidence of infarction or inducible ischemia.\par \par labs 10-11-17 wbc 8.6, h/h 12.2/36.8, plat 208, Na+ 141, K 4.7, bun/creat 21/1.14\par \par EKG ordered and interpreted by me on March 20 2018. The patient has been interpretation coronary artery disease. In addition to a normal sinus rhythm. Baseline. The progression. No acute ST-T changes.\par \par Reviewed on November 15, 2018.\par Labs from October 24, 2018 were reviewed.\par Echocardiogram November 12, 2018 LV ejection fraction 60%. Mild aortic stenosis. Mild mitral regurgitation.\par Carotid Doppler study November 12, 2018. Nonobstructive disease \par EKG November 15, 2018. Indication coronary artery disease. Interpretation normal sinus rhythm. 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 No significant new changes. \par \par Reviewed on January 9, 2020.\par Echocardiogram January 9, 2001 EF around 60% with mild mitral regurgitation. Mild aortic regurgitation. LVH with pulmonary pressures of 45 mm mercury. A slight increase in pulmonary pressures noted before.\par Labs from January 7, 2020 were reviewed. Hemoglobin 11.0, platelet count 238.\par CMP, stable on January 7, 2020.\par November 2019. Labs had shown stable. Lipid panel.\par \par Reviewed July 28, 2020\par Labs from January 7, 2020 and May 28, 2020 were reviewed.\par \par EKG normal sinus rhythm low voltage poor R wave progression\par Labs from April 2021 were reviewed excellent lipid panel otherwise stable labs elevated white count\par

## 2021-09-20 NOTE — REASON FOR VISIT
[Symptom and Test Evaluation] : symptom and test evaluation [Hyperlipidemia] : hyperlipidemia [Follow-Up - Clinic] : a clinic follow-up of [Chest Pain] : chest pain [Coronary Artery Disease] : coronary artery disease [Dizziness] : dizziness [Hypertension] : hypertension [Syncope] : syncope

## 2021-09-20 NOTE — PHYSICAL EXAM
[General Appearance - Well Developed] : well developed [Normal Appearance] : normal appearance [Well Groomed] : well groomed [General Appearance - Well Nourished] : well nourished [No Deformities] : no deformities [General Appearance - In No Acute Distress] : no acute distress [Normal Conjunctiva] : the conjunctiva exhibited no abnormalities [No Oral Pallor] : no oral pallor [Normal Jugular Venous A Waves Present] : normal jugular venous A waves present [Normal Jugular Venous V Waves Present] : normal jugular venous V waves present [No Jugular Venous Koehler A Waves] : no jugular venous koehler A waves [] : no respiratory distress [Respiration, Rhythm And Depth] : normal respiratory rhythm and effort [Exaggerated Use Of Accessory Muscles For Inspiration] : no accessory muscle use [Auscultation Breath Sounds / Voice Sounds] : lungs were clear to auscultation bilaterally [Heart Rate And Rhythm] : heart rate and rhythm were normal [Heart Sounds] : normal S1 and S2 [Arterial Pulses Normal] : the arterial pulses were normal [Veins - Varicosity Changes] : no varicosital changes were noted in the lower extremities [Abdomen Soft] : soft [Abnormal Walk] : normal gait [Nail Clubbing] : no clubbing of the fingernails [Cyanosis, Localized] : no localized cyanosis [Skin Color & Pigmentation] : normal skin color and pigmentation [Oriented To Time, Place, And Person] : oriented to person, place, and time [Affect] : the affect was normal [Mood] : the mood was normal [No Anxiety] : not feeling anxious [FreeTextEntry1] : Walks with limp.

## 2021-09-27 ENCOUNTER — RX RENEWAL (OUTPATIENT)
Age: 86
End: 2021-09-27

## 2021-10-05 PROBLEM — I49.5 SICK SINUS SYNDROME: Status: ACTIVE | Noted: 2017-11-14

## 2021-10-06 ENCOUNTER — APPOINTMENT (OUTPATIENT)
Dept: CARDIOLOGY | Facility: CLINIC | Age: 86
End: 2021-10-06
Payer: MEDICARE

## 2021-10-06 VITALS
DIASTOLIC BLOOD PRESSURE: 60 MMHG | SYSTOLIC BLOOD PRESSURE: 104 MMHG | HEIGHT: 65 IN | OXYGEN SATURATION: 94 % | HEART RATE: 72 BPM | BODY MASS INDEX: 24.99 KG/M2 | TEMPERATURE: 97.7 F | WEIGHT: 150 LBS

## 2021-10-06 DIAGNOSIS — I49.5 SICK SINUS SYNDROME: ICD-10-CM

## 2021-10-06 PROCEDURE — 99214 OFFICE O/P EST MOD 30 MIN: CPT

## 2021-10-06 NOTE — ASSESSMENT
[FreeTextEntry1] : Past tests for reference.\par cardiac catheterization 1-19-11 distal left main 50%, mid LAD 70% (LIMa-LAD normal), left circ luminal irregularities, mid RCA 90%, 75%, distal RCA 90% (SVG-RCA luminal irregularities), SVG-OM 10%\par \par echo 12/7/15; 60% lse calcified av/mv, mild mr.tr pasp 34 mm of hg.\par \par EKG ordered and interpreted by me on October 12, 2017 indication chest pain. Known coronary artery disease. Interpretation sinus bradycardia. Rightward axis septal infarct. Nonspecific ST-T changes.\par \par echo 12-1-16 ef 60% with normal ventricular function, mild diatolic dysfunction, mild CT/MR, aneurysmal inter-atrial septum, c/w borderline PHTN pulmonary  pressures 38mmhg, moderate LAE \par \par carotid ultrasound 8-25-16 disease with BERNARDA/CCA ratio is pete due to low CCA velocities. Decrease in LICA/CCA ratio noted. \par \par nuclear stress test 10-13-17 lexiscan equivocal for ischemia by ekg with no evidence of infarction or inducible ischemia.\par \par labs 10-11-17 wbc 8.6, h/h 12.2/36.8, plat 208, Na+ 141, K 4.7, bun/creat 21/1.14\par \par EKG ordered and interpreted by me on March 20 2018. The patient has been interpretation coronary artery disease. In addition to a normal sinus rhythm. Baseline. The progression. No acute ST-T changes.\par \par Labs from October 24, 2018 were reviewed.\par Echocardiogram November 12, 2018 LV ejection fraction 60%. Mild aortic stenosis. Mild mitral regurgitation.\par Carotid Doppler study November 12, 2018. Nonobstructive disease \par EKG November 15, 2018. Indication coronary artery disease. Interpretation normal sinus rhythm. 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 No significant new changes. \par \par Echocardiogram January 9, 2001 EF around 60% with mild mitral regurgitation. Mild aortic regurgitation. LVH with pulmonary pressures of 45 mm mercury. A slight increase in pulmonary pressures noted before.\par Labs from January 7, 2020 were reviewed. Hemoglobin 11.0, platelet count 238.\par CMP, stable on January 7, 2020.\par November 2019. Labs had shown stable. Lipid panel.\par \par Labs from January 7, 2020 and May 28, 2020 were reviewed.\par \par EKG normal sinus rhythm low voltage poor R wave progression\par Labs from April 2021 were reviewed excellent lipid panel otherwise stable labs elevated white count\par

## 2021-10-06 NOTE — DISCUSSION/SUMMARY
[FreeTextEntry1] : BOOM HOLGUIN  is a 89 year F  who presents today October 6, 2021 with the above history and the following active issues. \par \par -Leukocytosis and anemia being followed with hematologist. Last set of blood work reviewed. \par \par - Gastroesophageal reflux disease.  Continue to follow-up with GI. \par \par - Coronary artery disease status post CABG.  Continue ASA 81 mg.  \par \par -Hypertension. Blood pressure well controlled on my examination 104/60. Recommend holding Amlodipine and monitoring her BP with new monitor. Reassessment in our office in 1 month with home blood pressure cuff to assess for accuracy. Low Sodium diet. \par \par -hyperlipidemia restart Crestor, follow labs.\par Lifestyle and risk factor modification.\par \par -Non-rheumatic mitral and aortic insufficiency. Concentric LVH . mild pulmonary attention. Clinically, no signs of left or right heart failure. Continue to follow echo\par \par -PVD. Carotid Doppler study. Any significant progression she would warrant further evaluation. Continue lifestyle and risk factor modifications.\par \par Red flag symptoms which would warrant sooner emergent evaluation reviewed with the patient. \par Questions and concerns were addressed and answered. \par \par Sincerely,\par \par Ema Slater PA-C\par Patients history, testing and plan reviewed with supervising MD: Dr. aHrpal Locke

## 2021-10-06 NOTE — HISTORY OF PRESENT ILLNESS
[FreeTextEntry1] : BOOM HOLGUIN  is a 89 year F  who presents today October 6, 2021 in clinical follow-up and for reassessment of blood pressure. At last office visit patient had complaints of lightheadedness and requested to have trial off Amlodipine. Blood pressure on my assessment today 104/60. Unfortunately she has been monitoring her BP with home monitor which is inaccurate and giving extremely high numbers so she restarted taking the Amlodipine. \par We discussed going back to holding the medication and monitoring blood pressure with new monitor. \par \par No recent fall. Walking with cane. Feeling very well and with a good amount of energy. \par \par Today she denies chest pain, pressure, unusual shortness of breath, lightheadedness, dizziness, near syncope or syncope.\par  \par PMH as noted below:\par Coronary artery disease status post CABG, which includes LIMA to LAD. 4/2010 [facility]/[place].  Nuclear stress test from 10/17 with no significant perfusion defect. \par \par Essential hypertension\par \par History of having gastric ulcer with a GI bleed in December.  s/p transfusions. GI evaluation for PUD, followed with Dr. Ghosh in the past.\par \par Dyslipidemia. Discontinued statin on her own - it was discussed at OV to restart statin\par \par Moderate mitral regurgitation. Remaining stable.\par \par Mild pulmonary hypertension.  No CHF\par \par History of depression. improved.\par \par History of syncopal episode.  Implantable loop recorder. No significant findings.\par \par Bilateral moderate carotid stenosis.  Per past note; No significant change when evaluated in April 2015.  Remaining without neurological events \par

## 2021-10-06 NOTE — PHYSICAL EXAM
[General Appearance - Well Developed] : well developed [Normal Appearance] : normal appearance [Well Groomed] : well groomed [General Appearance - Well Nourished] : well nourished [No Deformities] : no deformities [General Appearance - In No Acute Distress] : no acute distress [Normal Conjunctiva] : the conjunctiva exhibited no abnormalities [No Oral Pallor] : no oral pallor [Normal Jugular Venous A Waves Present] : normal jugular venous A waves present [Normal Jugular Venous V Waves Present] : normal jugular venous V waves present [No Jugular Venous Koehler A Waves] : no jugular venous koehler A waves [] : no respiratory distress [Respiration, Rhythm And Depth] : normal respiratory rhythm and effort [Exaggerated Use Of Accessory Muscles For Inspiration] : no accessory muscle use [Auscultation Breath Sounds / Voice Sounds] : lungs were clear to auscultation bilaterally [Heart Rate And Rhythm] : heart rate and rhythm were normal [Heart Sounds] : normal S1 and S2 [Arterial Pulses Normal] : the arterial pulses were normal [Veins - Varicosity Changes] : no varicosital changes were noted in the lower extremities [Abdomen Soft] : soft [Abnormal Walk] : normal gait [Nail Clubbing] : no clubbing of the fingernails [Cyanosis, Localized] : no localized cyanosis [Skin Color & Pigmentation] : normal skin color and pigmentation [Oriented To Time, Place, And Person] : oriented to person, place, and time [Affect] : the affect was normal [Mood] : the mood was normal [No Anxiety] : not feeling anxious [FreeTextEntry1] : Walks with cane

## 2021-10-06 NOTE — REVIEW OF SYSTEMS
[Joint Pain] : joint pain [Joint Swelling] : joint swelling [Joint Stiffness] : joint stiffness [Negative] : Heme/Lymph [Feeling Fatigued] : not feeling fatigued [SOB] : no shortness of breath [Dyspnea on exertion] : not dyspnea during exertion [Chest Discomfort] : no chest discomfort [Lower Ext Edema] : no extremity edema [Leg Claudication] : no intermittent leg claudication [Palpitations] : no palpitations [Orthopnea] : no orthopnea [PND] : no PND [Syncope] : no syncope

## 2021-11-01 ENCOUNTER — APPOINTMENT (OUTPATIENT)
Dept: CARDIOLOGY | Facility: CLINIC | Age: 86
End: 2021-11-01
Payer: MEDICARE

## 2021-11-01 VITALS
DIASTOLIC BLOOD PRESSURE: 60 MMHG | BODY MASS INDEX: 24.99 KG/M2 | WEIGHT: 150 LBS | HEIGHT: 65 IN | HEART RATE: 68 BPM | SYSTOLIC BLOOD PRESSURE: 110 MMHG | OXYGEN SATURATION: 98 %

## 2021-11-01 PROCEDURE — 99213 OFFICE O/P EST LOW 20 MIN: CPT

## 2021-11-01 RX ORDER — AMLODIPINE BESYLATE 2.5 MG/1
2.5 TABLET ORAL DAILY
Qty: 90 | Refills: 0 | Status: DISCONTINUED | COMMUNITY
Start: 2021-07-19 | End: 2021-11-01

## 2021-11-01 RX ORDER — OMEPRAZOLE 40 MG/1
40 CAPSULE, DELAYED RELEASE ORAL
Qty: 90 | Refills: 3 | Status: DISCONTINUED | COMMUNITY
End: 2021-11-01

## 2021-11-01 NOTE — PHYSICAL EXAM
[General Appearance - Well Developed] : well developed [Normal Appearance] : normal appearance [Well Groomed] : well groomed [General Appearance - Well Nourished] : well nourished [No Deformities] : no deformities [General Appearance - In No Acute Distress] : no acute distress [Normal Conjunctiva] : the conjunctiva exhibited no abnormalities [No Oral Pallor] : no oral pallor [Normal Jugular Venous A Waves Present] : normal jugular venous A waves present [Normal Jugular Venous V Waves Present] : normal jugular venous V waves present [No Jugular Venous Koehler A Waves] : no jugular venous koehler A waves [] : no respiratory distress [Respiration, Rhythm And Depth] : normal respiratory rhythm and effort [Exaggerated Use Of Accessory Muscles For Inspiration] : no accessory muscle use [Auscultation Breath Sounds / Voice Sounds] : lungs were clear to auscultation bilaterally [Heart Rate And Rhythm] : heart rate and rhythm were normal [Heart Sounds] : normal S1 and S2 [Arterial Pulses Normal] : the arterial pulses were normal [Veins - Varicosity Changes] : no varicosital changes were noted in the lower extremities [Abdomen Soft] : soft [Nail Clubbing] : no clubbing of the fingernails [Cyanosis, Localized] : no localized cyanosis [Skin Color & Pigmentation] : normal skin color and pigmentation [Oriented To Time, Place, And Person] : oriented to person, place, and time [Affect] : the affect was normal [Mood] : the mood was normal [No Anxiety] : not feeling anxious [FreeTextEntry1] : Walks with a cane

## 2021-11-01 NOTE — REASON FOR VISIT
[Symptom and Test Evaluation] : symptom and test evaluation [Hyperlipidemia] : hyperlipidemia [Follow-Up - Clinic] : a clinic follow-up of [Chest Pain] : chest pain [Coronary Artery Disease] : coronary artery disease [Dizziness] : dizziness [Hypertension] : hypertension [Syncope] : syncope [FreeTextEntry1] : 89-year-old female was seen in the follow-up consultation.  Labs were reviewed.  Mild edema.  Stable.  Without any chest pain shortness of breath PND orthopnea.  No significant palpitation.  Persistent nature.  Aggravated by standing for longer  Time.\par Gait and balance abnormality but no fall.  Uses cane.\par Her blood pressure is stable.\par She has not had any significant dizziness.\par She has occasional substernal chest pain.  Mainly at nighttime after eating and lying down.  None with activity and exercise.  She is very active at 89.  She does 20 minutes of walking on a treadmill on a daily basis at home.\par Her gastroesophageal reflux related symptoms are stable\par There is no hemoptysis, hematemesis, melena, or black, tarry stool.\par She has no palpitations or syncopal episode.\par She has no visual disturbances or focal weakness.\par She does not have any increased caffeine or alcohol intake.\par Denies any recent hospital admission

## 2021-11-01 NOTE — HISTORY OF PRESENT ILLNESS
[FreeTextEntry1] : \par PMH as noted below:\par Coronary artery disease status post CABG, which includes LIMA to LAD. 4/2010 [facility] [address]/[place].  Nuclear stress test from 10/17 with no significant perfusion defect. \par \par Essential hypertension:  Well controlled. \par \par History of having gastric ulcer with a GI bleed in December.  s/p transfusions. GI evaluation for PUD, followed with Dr. Ghosh in the past.\par \par Dyslipidemia. Remaining on statin.\par \par Moderate mitral regurgitation. Remaining stable.\par \par Mild pulmonary hypertension.  No CHF\par \par History of depression. improved.\par \par History of syncopal episode.  Implantable loop recorder. No significant findings.\par \par Bilateral moderate carotid stenosis.  Per past note; No significant change when evaluated in April 2015.  Remaining without neurological events \par

## 2021-11-01 NOTE — REVIEW OF SYSTEMS
[Feeling Fatigued] : feeling fatigued [Chest Discomfort] : chest discomfort [Joint Pain] : joint pain [Joint Swelling] : joint swelling [Joint Stiffness] : joint stiffness [Negative] : Heme/Lymph [SOB] : no shortness of breath [Dyspnea on exertion] : not dyspnea during exertion [Lower Ext Edema] : no extremity edema [Leg Claudication] : no intermittent leg claudication [Palpitations] : no palpitations [Orthopnea] : no orthopnea [PND] : no PND [Syncope] : no syncope [Heartburn] : heartburn [FreeTextEntry8] : Increased urinary frequency without any dysuria, pain, hematuria, fever [de-identified] : Balance abnormality

## 2021-11-01 NOTE — CARDIOLOGY SUMMARY
[de-identified] : nsr low voltage complexes poor R wave progression.  June 7, 2020 [de-identified] : January 9, 2020.  EF 60 mild mitral regurgitation RVSP 45.

## 2021-11-01 NOTE — ASSESSMENT
[FreeTextEntry1] : Past tests for reference.\par cardiac catheterization 1-19-11 distal left main 50%, mid LAD 70% (LIMa-LAD normal), left circ luminal irregularities, mid RCA 90%, 75%, distal RCA 90% (SVG-RCA luminal irregularities), SVG-OM 10%\par \par echo 12/7/15; 60% lse calcified av/mv, mild mr.tr pasp 34 mm of hg.\par \par EKG ordered and interpreted by me on October 12, 2017 indication chest pain. Known coronary artery disease. Interpretation sinus bradycardia. Rightward axis septal infarct. Nonspecific ST-T changes.\par \par echo 12-1-16 ef 60% with normal ventricular function, mild diatolic dysfunction, mild [place]/MR, aneurysmal inter-atrial septum, c/w borderline PHTN pulmonary  pressures 38mmhg, moderate LAE \par \par carotid ultrasound 8-25-16 disease with BERNARDA/CCA ratio is pete due to low CCA velocities. Decrease in LICA/CCA ratio noted. \par \par nuclear stress test 10-13-17 lexiscan equivocal for ischemia by ekg with no evidence of infarction or inducible ischemia.\par \par labs 10-11-17 wbc 8.6, h/h 12.2/36.8, plat 208, Na+ 141, K 4.7, bun/creat 21/1.14\par \par EKG ordered and interpreted by me on March 20 2018. The patient has been interpretation coronary artery disease. In addition to a normal sinus rhythm. Baseline. The progression. No acute ST-T changes.\par \par Reviewed on November 15, 2018.\par Labs from October 24, 2018 were reviewed.\par Echocardiogram November 12, 2018 LV ejection fraction 60%. Mild aortic stenosis. Mild mitral regurgitation.\par Carotid Doppler study November 12, 2018. Nonobstructive disease \par EKG November 15, 2018. Indication coronary artery disease. Interpretation normal sinus rhythm. 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 No significant new changes. \par \par Reviewed on January 9, 2020.\par Echocardiogram January 9, 2001 EF around 60% with mild mitral regurgitation. Mild aortic regurgitation. LVH with pulmonary pressures of 45 mm mercury. A slight increase in pulmonary pressures noted before.\par Labs from January 7, 2020 were reviewed. Hemoglobin 11.0, platelet count 238.\par CMP, stable on January 7, 2020.\par November 2019. Labs had shown stable. Lipid panel.\par \par Reviewed July 28, 2020\par Labs from January 7, 2020 and May 28, 2020 were reviewed.\par \par EKG normal sinus rhythm low voltage poor R wave progression\par Labs from April 2021 were reviewed excellent lipid panel otherwise stable labs elevated white count\par \par Reviewed on November 1, 2021.\par Labs from July 16, 2021 were reviewed.\par

## 2021-11-01 NOTE — DISCUSSION/SUMMARY
[FreeTextEntry1] : Assessment and Suggestions:\par 89-year-old female with above medical history and active medical problems as noted below\par -Leukocytosis and anemia being followed with hematologist\par - chest pain: Stable.  Mainly associated with gastroesophageal reflux disease.  Continue management of ischemic heart disease, and gastroesophageal duplex disease.\par Coronary artery disease.  CABG.  Decrease aspirin to 81 mg.  Statin therapy\par -HTN.  Controlled.  Off antihypertensive therapy.  In presence of ankle edema.  Which most likely related to venous insufficiency rather than congestive heart failure.  \par -hyperlipidemia on Crestor, follow labs.\par -Non-rheumatic mitral and aortic insufficiency. Concentric LVH . mild pulmonary attention. Clinically, no signs of left or right heart failure. Continue to follow echo\par -PVD.  Carotid atherosclerosis.  Mild 2018.  Continue aspirin and statin therapy.  No neurological event.\par -Gait and balance abnormality.  Safety precaution.  Physical therapy.\par -Increased urinary frequency at night without symptoms.  No clinical signs of CHF.  Follow-up with your office and or urological evaluation.\par \par Counseling regarding low saturated fat, low carbohydrate intake was reviewed. Active lifestyle and regular. Exercise is along with weight maintenance is advised.\par I have reviewed above at length. I answered all the questions. Patient verbalized understanding\par Thank you very much for allowing me to participate in your patient's care. Please feel free to call me for any questions.\par \par Followup in 3 months

## 2022-02-09 ENCOUNTER — APPOINTMENT (OUTPATIENT)
Dept: CARDIOLOGY | Facility: CLINIC | Age: 87
End: 2022-02-09
Payer: MEDICARE

## 2022-02-09 VITALS
SYSTOLIC BLOOD PRESSURE: 100 MMHG | BODY MASS INDEX: 24.66 KG/M2 | HEIGHT: 65 IN | DIASTOLIC BLOOD PRESSURE: 60 MMHG | WEIGHT: 148 LBS | OXYGEN SATURATION: 95 % | HEART RATE: 66 BPM

## 2022-02-09 PROCEDURE — 99214 OFFICE O/P EST MOD 30 MIN: CPT

## 2022-02-09 RX ORDER — LORATADINE 10 MG/1
10 CAPSULE, LIQUID FILLED ORAL
Refills: 0 | Status: DISCONTINUED | COMMUNITY
End: 2022-02-09

## 2022-02-09 NOTE — REVIEW OF SYSTEMS
[Feeling Fatigued] : feeling fatigued [Chest Discomfort] : chest discomfort [Heartburn] : heartburn [Joint Pain] : joint pain [Joint Swelling] : joint swelling [Joint Stiffness] : joint stiffness [Negative] : Heme/Lymph [SOB] : no shortness of breath [Dyspnea on exertion] : not dyspnea during exertion [Lower Ext Edema] : no extremity edema [Leg Claudication] : no intermittent leg claudication [Palpitations] : no palpitations [Orthopnea] : no orthopnea [PND] : no PND [Syncope] : no syncope [FreeTextEntry8] : Increased urinary frequency without any dysuria, pain, hematuria, fever [de-identified] : Balance abnormality

## 2022-02-09 NOTE — DISCUSSION/SUMMARY
[FreeTextEntry1] : Assessment and Suggestions:\par 89-year-old female with above medical history and active medical problems as noted below\par -Leukocytosis and anemia being followed with hematologist\par - chest pain: Stable.  Mainly associated with gastroesophageal reflux disease.  Continue management of ischemic heart disease, and gastroesophageal duplex disease.\par Coronary artery disease.  CABG.  Decrease aspirin to 81 mg.  Continue statin therapy\par -HTN.  Controlled.  Off antihypertensive therapy.  In presence of ankle edema.  Which most likely related to venous insufficiency rather than congestive heart failure.  \par -hyperlipidemia on Crestor, follow labs.\par -Non-rheumatic mitral and aortic insufficiency. Concentric LVH . mild pulmonary attention. Clinically, no signs of left or right heart failure. Continue to follow echo\par -PVD.  Carotid atherosclerosis.  Mild 2018.  Continue aspirin and statin therapy.  No neurological event.  Carotid Doppler study ordered.\par -Gait and balance abnormality.  Safety precaution.  Physical therapy.\par \par \par Counseling regarding low saturated fat, low carbohydrate intake was reviewed. Active lifestyle and regular. Exercise is along with weight maintenance is advised.\par I have reviewed above at length. I answered all the questions. Patient verbalized understanding\par Thank you very much for allowing me to participate in your patient's care. Please feel free to call me for any questions.\par \par Followup in 3 months

## 2022-02-09 NOTE — REASON FOR VISIT
[Symptom and Test Evaluation] : symptom and test evaluation [Hyperlipidemia] : hyperlipidemia [Hypertension] : hypertension [Coronary Artery Disease] : coronary artery disease [FreeTextEntry1] : 89-year-old female was seen in the follow-up consultation.  Labs were reviewed.  Mild edema.  Stable.  Without any chest pain shortness of breath PND orthopnea.  No significant palpitation.  Persistent nature.  Aggravated by standing for longer  Time.\par Gait and balance abnormality but no fall.  Uses cane.\par Her blood pressure is stable.\par She has not had any significant dizziness.\par She has occasional substernal chest pain.  Mainly at nighttime after eating and lying down.  None with activity and exercise.  She is very active at 89.  She does 20 minutes of walking on a treadmill on a daily basis at home.\par Her gastroesophageal reflux related symptoms are stable\par There is no hemoptysis, hematemesis, melena, or black, tarry stool.\par She has no palpitations or syncopal episode.\par She has no visual disturbances or focal weakness.\par She does not have any increased caffeine or alcohol intake.\par Denies any recent hospital admission

## 2022-02-09 NOTE — ASSESSMENT
[FreeTextEntry1] : Past tests for reference.\par cardiac catheterization 1-19-11 distal left main 50%, mid LAD 70% (LIMa-LAD normal), left circ luminal irregularities, mid RCA 90%, 75%, distal RCA 90% (SVG-RCA luminal irregularities), SVG-OM 10%\par \par echo 12/7/15; 60% lse calcified av/mv, mild mr.tr pasp 34 mm of hg.\par \par EKG ordered and interpreted by me on October 12, 2017 indication chest pain. Known coronary artery disease. Interpretation sinus bradycardia. Rightward axis septal infarct. Nonspecific ST-T changes.\par \par echo 12-1-16 ef 60% with normal ventricular function, mild diatolic dysfunction, mild GA/MR, aneurysmal inter-atrial septum, c/w borderline PHTN pulmonary  pressures 38mmhg, moderate LAE \par \par carotid ultrasound 8-25-16 disease with BERNARDA/CCA ratio is pete due to low CCA velocities. Decrease in LICA/CCA ratio noted. \par \par nuclear stress test 10-13-17 lexiscan equivocal for ischemia by ekg with no evidence of infarction or inducible ischemia.\par \par labs 10-11-17 wbc 8.6, h/h 12.2/36.8, plat 208, Na+ 141, K 4.7, bun/creat 21/1.14\par \par EKG ordered and interpreted by me on March 20 2018. The patient has been interpretation coronary artery disease. In addition to a normal sinus rhythm. Baseline. The progression. No acute ST-T changes.\par \par Reviewed on November 15, 2018.\par Labs from October 24, 2018 were reviewed.\par Echocardiogram November 12, 2018 LV ejection fraction 60%. Mild aortic stenosis. Mild mitral regurgitation.\par Carotid Doppler study November 12, 2018. Nonobstructive disease \par EKG November 15, 2018. Indication coronary artery disease. Interpretation normal sinus rhythm. 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 No significant new changes. \par \par Reviewed on January 9, 2020.\par Echocardiogram January 9, 2001 EF around 60% with mild mitral regurgitation. Mild aortic regurgitation. LVH with pulmonary pressures of 45 mm mercury. A slight increase in pulmonary pressures noted before.\par Labs from January 7, 2020 were reviewed. Hemoglobin 11.0, platelet count 238.\par CMP, stable on January 7, 2020.\par November 2019. Labs had shown stable. Lipid panel.\par \par Reviewed July 28, 2020\par Labs from January 7, 2020 and May 28, 2020 were reviewed.\par \par EKG normal sinus rhythm low voltage poor R wave progression\par Labs from April 2021 were reviewed excellent lipid panel otherwise stable labs elevated white count\par \par Reviewed on November 1, 2021.\par Labs from July 16, 2021 were reviewed.\par \par reviewed  2/9/22\par Labs from October 20, 2021 were reviewed

## 2022-02-09 NOTE — HISTORY OF PRESENT ILLNESS
[FreeTextEntry1] : \par PMH as noted below:\par Coronary artery disease status post CABG, which includes LIMA to LAD. 4/2010 [facility]/[place].  Nuclear stress test from 10/17 with no significant perfusion defect. \par \par Essential hypertension:  Well controlled. \par \par History of having gastric ulcer with a GI bleed in December.  s/p transfusions. GI evaluation for PUD, followed with Dr. Ghosh in the past.\par \par Dyslipidemia. Remaining on statin.\par \par Moderate mitral regurgitation. Remaining stable.\par \par Mild pulmonary hypertension.  No CHF\par \par History of depression. improved.\par \par History of syncopal episode.  Implantable loop recorder. No significant findings.\par \par Bilateral moderate carotid stenosis.  Per past note; No significant change when evaluated in April 2015.  Remaining without neurological events \par

## 2022-02-09 NOTE — CARDIOLOGY SUMMARY
[de-identified] : nsr low voltage complexes poor R wave progression.  June 7, 2020 [de-identified] : January 9, 2020.  EF 60 mild mitral regurgitation RVSP 45.

## 2022-02-09 NOTE — PHYSICAL EXAM
[General Appearance - Well Developed] : well developed [Normal Appearance] : normal appearance [Well Groomed] : well groomed [General Appearance - Well Nourished] : well nourished [No Deformities] : no deformities [General Appearance - In No Acute Distress] : no acute distress [Normal Jugular Venous A Waves Present] : normal jugular venous A waves present [Normal Jugular Venous V Waves Present] : normal jugular venous V waves present [No Jugular Venous Koehler A Waves] : no jugular venous koehler A waves [] : no respiratory distress [Respiration, Rhythm And Depth] : normal respiratory rhythm and effort [Exaggerated Use Of Accessory Muscles For Inspiration] : no accessory muscle use [Auscultation Breath Sounds / Voice Sounds] : lungs were clear to auscultation bilaterally [Heart Rate And Rhythm] : heart rate and rhythm were normal [Heart Sounds] : normal S1 and S2 [Arterial Pulses Normal] : the arterial pulses were normal [Veins - Varicosity Changes] : no varicosital changes were noted in the lower extremities [Nail Clubbing] : no clubbing of the fingernails [Cyanosis, Localized] : no localized cyanosis [Oriented To Time, Place, And Person] : oriented to person, place, and time [Affect] : the affect was normal [Mood] : the mood was normal [No Anxiety] : not feeling anxious [FreeTextEntry1] : Walks with a cane

## 2022-05-11 ENCOUNTER — APPOINTMENT (OUTPATIENT)
Dept: CARDIOLOGY | Facility: CLINIC | Age: 87
End: 2022-05-11
Payer: MEDICARE

## 2022-05-24 ENCOUNTER — APPOINTMENT (OUTPATIENT)
Dept: CARDIOLOGY | Facility: CLINIC | Age: 87
End: 2022-05-24
Payer: MEDICARE

## 2022-05-24 PROCEDURE — 93880 EXTRACRANIAL BILAT STUDY: CPT

## 2022-05-24 PROCEDURE — 93306 TTE W/DOPPLER COMPLETE: CPT

## 2022-06-15 ENCOUNTER — APPOINTMENT (OUTPATIENT)
Dept: CARDIOLOGY | Facility: CLINIC | Age: 87
End: 2022-06-15
Payer: MEDICARE

## 2022-06-15 ENCOUNTER — NON-APPOINTMENT (OUTPATIENT)
Age: 87
End: 2022-06-15

## 2022-06-15 VITALS
OXYGEN SATURATION: 96 % | HEIGHT: 65 IN | BODY MASS INDEX: 24.99 KG/M2 | DIASTOLIC BLOOD PRESSURE: 60 MMHG | HEART RATE: 66 BPM | SYSTOLIC BLOOD PRESSURE: 120 MMHG | WEIGHT: 150 LBS

## 2022-06-15 PROCEDURE — 99214 OFFICE O/P EST MOD 30 MIN: CPT

## 2022-06-15 PROCEDURE — 93000 ELECTROCARDIOGRAM COMPLETE: CPT

## 2022-06-15 RX ORDER — OMEGA-3/DHA/EPA/FISH OIL 300-1000MG
400 CAPSULE ORAL DAILY
Refills: 0 | Status: DISCONTINUED | COMMUNITY
End: 2022-06-15

## 2022-06-15 NOTE — PHYSICAL EXAM
[Normal Appearance] : normal appearance [General Appearance - Well Developed] : well developed [Well Groomed] : well groomed [General Appearance - Well Nourished] : well nourished [No Deformities] : no deformities [General Appearance - In No Acute Distress] : no acute distress [] : no respiratory distress [Respiration, Rhythm And Depth] : normal respiratory rhythm and effort [Exaggerated Use Of Accessory Muscles For Inspiration] : no accessory muscle use [Auscultation Breath Sounds / Voice Sounds] : lungs were clear to auscultation bilaterally [Heart Rate And Rhythm] : heart rate and rhythm were normal [Heart Sounds] : normal S1 and S2 [Arterial Pulses Normal] : the arterial pulses were normal [Veins - Varicosity Changes] : no varicosital changes were noted in the lower extremities [Nail Clubbing] : no clubbing of the fingernails [Cyanosis, Localized] : no localized cyanosis [Oriented To Time, Place, And Person] : oriented to person, place, and time [Affect] : the affect was normal [Mood] : the mood was normal [No Anxiety] : not feeling anxious [FreeTextEntry1] : Walks with a cane

## 2022-06-15 NOTE — REVIEW OF SYSTEMS
[Feeling Fatigued] : feeling fatigued [Chest Discomfort] : chest discomfort [Heartburn] : heartburn [Joint Pain] : joint pain [Joint Swelling] : joint swelling [Joint Stiffness] : joint stiffness [Negative] : Heme/Lymph [SOB] : no shortness of breath [Dyspnea on exertion] : not dyspnea during exertion [Lower Ext Edema] : no extremity edema [Leg Claudication] : no intermittent leg claudication [Palpitations] : no palpitations [Orthopnea] : no orthopnea [PND] : no PND [Syncope] : no syncope [FreeTextEntry8] : Increased urinary frequency without any dysuria, pain, hematuria, fever [de-identified] : Balance abnormality

## 2022-06-15 NOTE — CARDIOLOGY SUMMARY
[de-identified] : nsr low voltage complexes poor R wave progression.  June 7, 2020\par 1/14/2022.  Normal sinus rhythm.  Poor R wave progression. [de-identified] : January 9, 2020.  EF 60 mild mitral regurgitation RVSP 45\par May 24, 2022..  LVEF 60% mild mitral and aortic regurgitation [de-identified] : May 24, 2022 [de-identified] : Bilateral carotid Doppler study.  May 24, 2022 mild nonobstructive disease

## 2022-06-15 NOTE — REASON FOR VISIT
[Symptom and Test Evaluation] : symptom and test evaluation [Hyperlipidemia] : hyperlipidemia [Hypertension] : hypertension [Coronary Artery Disease] : coronary artery disease [FreeTextEntry1] : 89-year-old female was seen in the follow-up consultation.  Labs were reviewed.  Echocardiogram and carotid Doppler study reviewed mild edema  Stable.  Without any chest pain shortness of breath PND orthopnea.  No significant palpitation.  Persistent nature.  Aggravated by standing for longer Time.\par Gait and balance abnormality but no fall.  Uses cane.\par Her blood pressure is stable.\par She has not had any significant dizziness.\par She has occasional substernal chest pain.  Mainly at nighttime after eating and lying down.  None with activity and exercise.  She is very active at 89.  She does 20 minutes of walking on a treadmill on a daily basis at home.\par Her gastroesophageal reflux related symptoms are stable\par There is no hemoptysis, hematemesis, melena, or black, tarry stool.\par She has no palpitations or syncopal episode.\par She has no visual disturbances or focal weakness.\par She does not have any increased caffeine or alcohol intake.\par Denies any recent hospital admission

## 2022-06-15 NOTE — HISTORY OF PRESENT ILLNESS
[FreeTextEntry1] : \par PMH as noted below:\par Coronary artery disease status post CABG, which includes LIMA to LAD. 4/2010 [facility] [address]/[place].  Nuclear stress test from 10/17 with no significant perfusion defect. \par \par Essential hypertension:  Well controlled. \par \par History of having gastric ulcer with a GI bleed in December.  s/p transfusions. GI evaluation for PUD, followed with Dr. Ghosh in the past.\par \par Dyslipidemia. Remaining on statin.\par \par Moderate mitral regurgitation. Remaining stable.\par \par Mild pulmonary hypertension.  No CHF\par \par History of depression. improved.\par \par History of syncopal episode.  Implantable loop recorder. No significant findings.\par \par Bilateral moderate carotid stenosis.  Per past note; No significant change when evaluated in April 2015.  Remaining without neurological events \par

## 2022-06-15 NOTE — ASSESSMENT
[FreeTextEntry1] : Past tests for reference.\par cardiac catheterization 1-19-11 distal left main 50%, mid LAD 70% (LIMa-LAD normal), left circ luminal irregularities, mid RCA 90%, 75%, distal RCA 90% (SVG-RCA luminal irregularities), SVG-OM 10%\par \par echo 12/7/15; 60% lse calcified av/mv, mild mr.tr pasp 34 mm of hg.\par \par EKG ordered and interpreted by me on October 12, 2017 indication chest pain. Known coronary artery disease. Interpretation sinus bradycardia. Rightward axis septal infarct. Nonspecific ST-T changes.\par \par echo 12-1-16 ef 60% with normal ventricular function, mild diatolic dysfunction, mild IN/MR, aneurysmal inter-atrial septum, c/w borderline PHTN pulmonary  pressures 38mmhg, moderate LAE \par \par carotid ultrasound 8-25-16 disease with BERNARDA/CCA ratio is pete due to low CCA velocities. Decrease in LICA/CCA ratio noted. \par \par nuclear stress test 10-13-17 lexiscan equivocal for ischemia by ekg with no evidence of infarction or inducible ischemia.\par \par labs 10-11-17 wbc 8.6, h/h 12.2/36.8, plat 208, Na+ 141, K 4.7, bun/creat 21/1.14\par \par EKG ordered and interpreted by me on March 20 2018. The patient has been interpretation coronary artery disease. In addition to a normal sinus rhythm. Baseline. The progression. No acute ST-T changes.\par \par Reviewed on November 15, 2018.\par Labs from October 24, 2018 were reviewed.\par Echocardiogram November 12, 2018 LV ejection fraction 60%. Mild aortic stenosis. Mild mitral regurgitation.\par Carotid Doppler study November 12, 2018. Nonobstructive disease \par EKG November 15, 2018. Indication coronary artery disease. Interpretation normal sinus rhythm. 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 No significant new changes. \par \par Reviewed on January 9, 2020.\par Echocardiogram January 9, 2001 EF around 60% with mild mitral regurgitation. Mild aortic regurgitation. LVH with pulmonary pressures of 45 mm mercury. A slight increase in pulmonary pressures noted before.\par Labs from January 7, 2020 were reviewed. Hemoglobin 11.0, platelet count 238.\par CMP, stable on January 7, 2020.\par November 2019. Labs had shown stable. Lipid panel.\par \par Reviewed July 28, 2020\par Labs from January 7, 2020 and May 28, 2020 were reviewed.\par \par EKG normal sinus rhythm low voltage poor R wave progression\par Labs from April 2021 were reviewed excellent lipid panel otherwise stable labs elevated white count\par \par Reviewed on November 1, 2021.\par Labs from July 16, 2021 were reviewed.\par \par reviewed  2/9/22\par Labs from October 20, 2021 were reviewed\par \par Reviewed on Jasmina 15, 2022.\par EKG as noted above\par Labs from October 2021 were reviewed again.\par Echocardiogram May 24, 2022 EF 60% mild mitral and aortic regurgitation.  Carotid Doppler study May 24, 2022 mild nonobstructive disease

## 2022-06-15 NOTE — DISCUSSION/SUMMARY
[FreeTextEntry1] : Assessment and Suggestions:\par 89-year-old female with above medical history and active medical problems as noted below\par -Leukocytosis and anemia being followed with hematologist\par - chest pain: Stable.  Mainly associated with gastroesophageal reflux disease.  Continue management of ischemic heart disease, and gastroesophageal duplex disease.\par Coronary artery disease.  CABG.  Decrease aspirin to 81 mg.  Continue statin therapy\par -HTN.  Controlled.  Off antihypertensive therapy.  In presence of ankle edema.  Which most likely related to venous insufficiency rather than congestive heart failure.  \par -hyperlipidemia on Crestor, follow labs.\par -Non-rheumatic mitral and aortic insufficiency. Concentric LVH . mild pulmonary attention. Clinically, no signs of left or right heart failure. Continue to follow echo\par -PVD.  Carotid atherosclerosis.  Nonobstructive.  Continue aspirin and statin therapy.  No neurological event. \par -Gait and balance abnormality.  Safety precaution.  Physical therapy.\par \par \par Counseling regarding low saturated fat, low carbohydrate intake was reviewed. Active lifestyle and regular. Exercise is along with weight maintenance is advised.\par I have reviewed above at length. I answered all the questions. Patient verbalized understanding\par Thank you very much for allowing me to participate in your patient's care. Please feel free to call me for any questions.\par \par Follow-up in 6 months or change in clinical status\par Sincerely,\par \par Harpal Locke MD,FACC,JACKY\par

## 2022-08-31 ENCOUNTER — NON-APPOINTMENT (OUTPATIENT)
Age: 87
End: 2022-08-31

## 2022-09-12 ENCOUNTER — APPOINTMENT (OUTPATIENT)
Dept: CARDIOLOGY | Facility: CLINIC | Age: 87
End: 2022-09-12

## 2022-09-12 VITALS
OXYGEN SATURATION: 96 % | HEART RATE: 68 BPM | DIASTOLIC BLOOD PRESSURE: 60 MMHG | HEIGHT: 65 IN | SYSTOLIC BLOOD PRESSURE: 120 MMHG | BODY MASS INDEX: 24.49 KG/M2 | WEIGHT: 147 LBS

## 2022-09-12 DIAGNOSIS — R07.89 OTHER CHEST PAIN: ICD-10-CM

## 2022-09-12 PROCEDURE — 99214 OFFICE O/P EST MOD 30 MIN: CPT

## 2022-09-12 NOTE — ASSESSMENT
[FreeTextEntry1] : Past tests for reference.\par cardiac catheterization 1-19-11 distal left main 50%, mid LAD 70% (LIMa-LAD normal), left circ luminal irregularities, mid RCA 90%, 75%, distal RCA 90% (SVG-RCA luminal irregularities), SVG-OM 10%\par \par echo 12/7/15; 60% lse calcified av/mv, mild mr.tr pasp 34 mm of hg.\par \par EKG ordered and interpreted by me on October 12, 2017 indication chest pain. Known coronary artery disease. Interpretation sinus bradycardia. Rightward axis septal infarct. Nonspecific ST-T changes.\par \par echo 12-1-16 ef 60% with normal ventricular function, mild diatolic dysfunction, mild PA/MR, aneurysmal inter-atrial septum, c/w borderline PHTN pulmonary  pressures 38mmhg, moderate LAE \par \par carotid ultrasound 8-25-16 disease with BERNARDA/CCA ratio is pete due to low CCA velocities. Decrease in LICA/CCA ratio noted. \par \par nuclear stress test 10-13-17 lexiscan equivocal for ischemia by ekg with no evidence of infarction or inducible ischemia.\par \par labs 10-11-17 wbc 8.6, h/h 12.2/36.8, plat 208, Na+ 141, K 4.7, bun/creat 21/1.14\par \par EKG ordered and interpreted by me on March 20 2018. The patient has been interpretation coronary artery disease. In addition to a normal sinus rhythm. Baseline. The progression. No acute ST-T changes.\par \par Reviewed on November 15, 2018.\par Labs from October 24, 2018 were reviewed.\par Echocardiogram November 12, 2018 LV ejection fraction 60%. Mild aortic stenosis. Mild mitral regurgitation.\par Carotid Doppler study November 12, 2018. Nonobstructive disease \par EKG November 15, 2018. Indication coronary artery disease. Interpretation normal sinus rhythm. 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 No significant new changes. \par \par Reviewed on January 9, 2020.\par Echocardiogram January 9, 2001 EF around 60% with mild mitral regurgitation. Mild aortic regurgitation. LVH with pulmonary pressures of 45 mm mercury. A slight increase in pulmonary pressures noted before.\par Labs from January 7, 2020 were reviewed. Hemoglobin 11.0, platelet count 238.\par CMP, stable on January 7, 2020.\par November 2019. Labs had shown stable. Lipid panel.\par \par Reviewed July 28, 2020\par Labs from January 7, 2020 and May 28, 2020 were reviewed.\par \par EKG normal sinus rhythm low voltage poor R wave progression\par Labs from April 2021 were reviewed excellent lipid panel otherwise stable labs elevated white count\par \par Reviewed on November 1, 2021.\par Labs from July 16, 2021 were reviewed.\par \par reviewed  2/9/22\par Labs from October 20, 2021 were reviewed\par \par Reviewed on Jasmina 15, 2022.\par EKG as noted above\par Labs from October 2021 were reviewed again.\par Echocardiogram May 24, 2022 EF 60% mild mitral and aortic regurgitation.  Carotid Doppler study May 24, 2022 mild nonobstructive disease\par \par Reviewed on September 12, 2022.\par Hospital data from September 1, 2022 reviewed.  Which includes EKG labs cardiology consult admit note discharge summary.

## 2022-09-12 NOTE — HISTORY OF PRESENT ILLNESS
[FreeTextEntry1] : \par PMH as noted below:\par Coronary artery disease status post CABG, which includes LIMA to LAD. 4/2010 [facility]/[place].  Nuclear stress test from 10/17 with no significant perfusion defect. \par \par Essential hypertension:  Well controlled. \par \par History of having gastric ulcer with a GI bleed in December.  s/p transfusions. GI evaluation for PUD, followed with Dr. Ghosh in the past.\par \par Dyslipidemia. Remaining on statin.\par \par Moderate mitral regurgitation. Remaining stable.\par \par Mild pulmonary hypertension.  No CHF\par \par History of depression. improved.\par \par History of syncopal episode.  Implantable loop recorder. No significant findings.\par \par Bilateral moderate carotid stenosis.  Per past note; No significant change when evaluated in April 2015.  Remaining without neurological events \par

## 2022-09-12 NOTE — DISCUSSION/SUMMARY
[FreeTextEntry1] : Assessment and Suggestions:\par 89-year-old female with above medical history and active medical problems as noted below\par - chest pain: Recent hospital admission.  Negative cardiac work-up.  Most likely associated with gastroesophageal reflux disease.  Continue management of ischemic heart disease, and gastroesophageal duplex disease.  Increase omeprazole to 40 mg twice daily.  Follow-up with gastroenterologist\par Coronary artery disease.  CABG.  Decrease aspirin to 81 mg.  Continue statin therapy\par -HTN.  Controlled.  Off antihypertensive therapy.  In presence of ankle edema.  Which most likely related to venous insufficiency rather than congestive heart failure.  \par -hyperlipidemia on Crestor, follow labs.\par -Non-rheumatic mitral and aortic insufficiency. Concentric LVH . mild pulmonary attention. Clinically, no signs of left or right heart failure. Continue to follow echo\par -PVD.  Carotid atherosclerosis.  Nonobstructive.  Continue aspirin and statin therapy.  No neurological event. \par -Gait and balance abnormality.  Safety precaution.  Physical therapy.\par \par Counseling regarding low saturated fat, low carbohydrate intake was reviewed. Active lifestyle and regular. Exercise is along with weight maintenance is advised.\par I have reviewed above at length. I answered all the questions. Patient verbalized understanding\par Thank you very much for allowing me to participate in your patient's care. Please feel free to call me for any questions.\par \par Sincerely,\par \par Harpal Locke MD,FACC,JACKY\par

## 2022-09-12 NOTE — PHYSICAL EXAM
[General Appearance - Well Developed] : well developed [Normal Appearance] : normal appearance [Well Groomed] : well groomed [General Appearance - Well Nourished] : well nourished [No Deformities] : no deformities [General Appearance - In No Acute Distress] : no acute distress [] : no respiratory distress [Respiration, Rhythm And Depth] : normal respiratory rhythm and effort [Exaggerated Use Of Accessory Muscles For Inspiration] : no accessory muscle use [Auscultation Breath Sounds / Voice Sounds] : lungs were clear to auscultation bilaterally [Heart Rate And Rhythm] : heart rate and rhythm were normal [Heart Sounds] : normal S1 and S2 [Arterial Pulses Normal] : the arterial pulses were normal [Veins - Varicosity Changes] : no varicosital changes were noted in the lower extremities [Nail Clubbing] : no clubbing of the fingernails [Cyanosis, Localized] : no localized cyanosis [Oriented To Time, Place, And Person] : oriented to person, place, and time [Affect] : the affect was normal [Mood] : the mood was normal [No Anxiety] : not feeling anxious [FreeTextEntry1] : Walks with a cane

## 2022-09-12 NOTE — REVIEW OF SYSTEMS
[Feeling Fatigued] : feeling fatigued [Chest Discomfort] : chest discomfort [Heartburn] : heartburn [Joint Pain] : joint pain [Joint Swelling] : joint swelling [Joint Stiffness] : joint stiffness [Negative] : Heme/Lymph [SOB] : no shortness of breath [Dyspnea on exertion] : not dyspnea during exertion [Lower Ext Edema] : no extremity edema [Leg Claudication] : no intermittent leg claudication [Palpitations] : no palpitations [Orthopnea] : no orthopnea [PND] : no PND [Syncope] : no syncope [FreeTextEntry8] : Increased urinary frequency without any dysuria, pain, hematuria, fever [de-identified] : Balance abnormality

## 2022-09-12 NOTE — CARDIOLOGY SUMMARY
[de-identified] : nsr low voltage complexes poor R wave progression.  June 7, 2020\par 1/14/2022.  Normal sinus rhythm.  Poor R wave progression. [de-identified] : January 9, 2020.  EF 60 mild mitral regurgitation RVSP 45\par May 24, 2022..  LVEF 60% mild mitral and aortic regurgitation [de-identified] : Bilateral carotid Doppler study.  May 24, 2022 mild nonobstructive disease

## 2022-09-12 NOTE — REASON FOR VISIT
[Symptom and Test Evaluation] : symptom and test evaluation [Hyperlipidemia] : hyperlipidemia [Hypertension] : hypertension [Coronary Artery Disease] : coronary artery disease [FreeTextEntry1] : 89-year-old female comes in for hospital follow-up.  She was admitted September 1, 2022 with chest pain.  She was ruled out for microinfarction.  EKG did not reveal any significant abnormality.  Labs were stable with anemia.\par She was discharged home.  Her echocardiogram at that time showed preserved LV ejection fraction.\par I have reviewed data available to me from [facility].\par Since then she has continued epigastric chest pressure.  More with meals.  Nonexertional.  No other associated symptoms with that.\par Remains fairly active limitation is arthritis.  Walks with a cane.\par There is no hemoptysis, hematemesis, melena, or black, tarry stool.\par She has no palpitations or syncopal episode.\par She has no visual disturbances or focal weakness.\par She does not have any increased caffeine or alcohol intake.\par Denies any recent hospital admission

## 2022-09-15 ENCOUNTER — NON-APPOINTMENT (OUTPATIENT)
Age: 87
End: 2022-09-15

## 2022-11-23 ENCOUNTER — OFFICE (OUTPATIENT)
Dept: URBAN - METROPOLITAN AREA CLINIC 8 | Facility: CLINIC | Age: 87
Setting detail: OPHTHALMOLOGY
End: 2022-11-23
Payer: MEDICARE

## 2022-11-23 DIAGNOSIS — H52.4: ICD-10-CM

## 2022-11-23 DIAGNOSIS — H26.493: ICD-10-CM

## 2022-11-23 PROCEDURE — 99024 POSTOP FOLLOW-UP VISIT: CPT | Performed by: OPHTHALMOLOGY

## 2022-11-23 PROCEDURE — 92015 DETERMINE REFRACTIVE STATE: CPT | Performed by: OPHTHALMOLOGY

## 2022-11-23 ASSESSMENT — REFRACTION_MANIFEST
OU_VA: 20/20
OD_VA2: 20/20(J1+)
OS_VA1: 20/20-2
OD_ADD: +2.50
OS_AXIS: 095
OU_VA: 20/20
OS_SPHERE: -1.00
OD_VA1: 20/20-2
OS_AXIS: 095
OS_CYLINDER: -0.50
OS_ADD: +2.50
OS_SPHERE: -1.00
OD_SPHERE: PLANO
OS_VA2: 20/20(J1+)
OS_CYLINDER: -0.50
OD_CYLINDER: SPH
OS_VA1: 20/20-2
OS_ADD: +2.50
OS_VA2: 20/20(J1+)
OD_VA2: 20/20(J1+)
OD_SPHERE: PLANO
OD_VA1: 20/20-2
OD_ADD: +2.50

## 2022-11-23 ASSESSMENT — AXIALLENGTH_DERIVED
OS_AL: 24.1371
OS_AL: 24.1371
OS_AL: 24.291
OD_AL: 23.5433

## 2022-11-23 ASSESSMENT — REFRACTION_CURRENTRX
OD_OVR_VA: 20/
OD_SPHERE: +2.50
OS_VPRISM_DIRECTION: SV
OD_VPRISM_DIRECTION: SV
OS_CYLINDER: -0.50
OS_SPHERE: +1.00
OS_OVR_VA: 20/
OD_CYLINDER: SPHERE
OS_AXIS: 058

## 2022-11-23 ASSESSMENT — KERATOMETRY
OS_K1POWER_DIOPTERS: 42.50
METHOD_AUTO_MANUAL: AUTO
OD_K2POWER_DIOPTERS: 44.50
OD_K1POWER_DIOPTERS: 42.50
OS_K2POWER_DIOPTERS: 44.25
OS_AXISANGLE_DEGREES: 005
OD_AXISANGLE_DEGREES: 179

## 2022-11-23 ASSESSMENT — SPHEQUIV_DERIVED
OS_SPHEQUIV: -1.25
OS_SPHEQUIV: -1.25
OD_SPHEQUIV: 0.125
OS_SPHEQUIV: -1.625

## 2022-11-23 ASSESSMENT — REFRACTION_AUTOREFRACTION
OS_CYLINDER: -0.75
OD_CYLINDER: -0.25
OS_AXIS: 094
OS_SPHERE: -1.25
OD_SPHERE: +0.25
OD_AXIS: 035

## 2022-11-23 ASSESSMENT — VISUAL ACUITY
OS_BCVA: 20/20-2
OD_BCVA: 20/100

## 2022-11-23 ASSESSMENT — CONFRONTATIONAL VISUAL FIELD TEST (CVF)
OS_FINDINGS: FULL
OD_FINDINGS: FULL

## 2022-12-14 ENCOUNTER — APPOINTMENT (OUTPATIENT)
Dept: CARDIOLOGY | Facility: CLINIC | Age: 87
End: 2022-12-14

## 2022-12-14 VITALS
WEIGHT: 142 LBS | HEIGHT: 65 IN | BODY MASS INDEX: 23.66 KG/M2 | SYSTOLIC BLOOD PRESSURE: 128 MMHG | OXYGEN SATURATION: 94 % | HEART RATE: 84 BPM | DIASTOLIC BLOOD PRESSURE: 60 MMHG

## 2022-12-14 PROCEDURE — 99214 OFFICE O/P EST MOD 30 MIN: CPT

## 2022-12-14 NOTE — ASSESSMENT
[FreeTextEntry1] : Past tests for reference.\par cardiac catheterization 1-19-11 distal left main 50%, mid LAD 70% (LIMa-LAD normal), left circ luminal irregularities, mid RCA 90%, 75%, distal RCA 90% (SVG-RCA luminal irregularities), SVG-OM 10%\par \par echo 12/7/15; 60% lse calcified av/mv, mild mr.tr pasp 34 mm of hg.\par \par EKG ordered and interpreted by me on October 12, 2017 indication chest pain. Known coronary artery disease. Interpretation sinus bradycardia. Rightward axis septal infarct. Nonspecific ST-T changes.\par \par echo 12-1-16 ef 60% with normal ventricular function, mild diatolic dysfunction, mild SC/MR, aneurysmal inter-atrial septum, c/w borderline PHTN pulmonary  pressures 38mmhg, moderate LAE \par \par carotid ultrasound 8-25-16 disease with BERNARDA/CCA ratio is pete due to low CCA velocities. Decrease in LICA/CCA ratio noted. \par \par nuclear stress test 10-13-17 lexiscan equivocal for ischemia by ekg with no evidence of infarction or inducible ischemia.\par \par labs 10-11-17 wbc 8.6, h/h 12.2/36.8, plat 208, Na+ 141, K 4.7, bun/creat 21/1.14\par \par EKG ordered and interpreted by me on March 20 2018. The patient has been interpretation coronary artery disease. In addition to a normal sinus rhythm. Baseline. The progression. No acute ST-T changes.\par \par Reviewed on November 15, 2018.\par Labs from October 24, 2018 were reviewed.\par Echocardiogram November 12, 2018 LV ejection fraction 60%. Mild aortic stenosis. Mild mitral regurgitation.\par Carotid Doppler study November 12, 2018. Nonobstructive disease \par EKG November 15, 2018. Indication coronary artery disease. Interpretation normal sinus rhythm. 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 No significant new changes. \par \par Reviewed on January 9, 2020.\par Echocardiogram January 9, 2001 EF around 60% with mild mitral regurgitation. Mild aortic regurgitation. LVH with pulmonary pressures of 45 mm mercury. A slight increase in pulmonary pressures noted before.\par Labs from January 7, 2020 were reviewed. Hemoglobin 11.0, platelet count 238.\par CMP, stable on January 7, 2020.\par November 2019. Labs had shown stable. Lipid panel.\par \par Reviewed July 28, 2020\par Labs from January 7, 2020 and May 28, 2020 were reviewed.\par \par EKG normal sinus rhythm low voltage poor R wave progression\par Labs from April 2021 were reviewed excellent lipid panel otherwise stable labs elevated white count\par \par Reviewed on November 1, 2021.\par Labs from July 16, 2021 were reviewed.\par \par reviewed  2/9/22\par Labs from October 20, 2021 were reviewed\par \par Reviewed on Jasmina 15, 2022.\par EKG as noted above\par Labs from October 2021 were reviewed again.\par Echocardiogram May 24, 2022 EF 60% mild mitral and aortic regurgitation.  Carotid Doppler study May 24, 2022 mild nonobstructive disease\par \par Reviewed on September 12, 2022.\par Hospital data from September 1, 2022 reviewed.  Which includes EKG labs cardiology consult admit note discharge summary.

## 2022-12-14 NOTE — CARDIOLOGY SUMMARY
[de-identified] : nsr low voltage complexes poor R wave progression.  June 7, 2020\par 1/14/2022.  Normal sinus rhythm.  Poor R wave progression. [de-identified] : January 9, 2020.  EF 60 mild mitral regurgitation RVSP 45\par May 24, 2022..  LVEF 60% mild mitral and aortic regurgitation [de-identified] : Bilateral carotid Doppler study.  May 24, 2022 mild nonobstructive disease

## 2022-12-14 NOTE — REVIEW OF SYSTEMS
[Feeling Fatigued] : feeling fatigued [Chest Discomfort] : chest discomfort [Heartburn] : heartburn [Joint Pain] : joint pain [Joint Swelling] : joint swelling [Joint Stiffness] : joint stiffness [Negative] : Heme/Lymph [SOB] : no shortness of breath [Dyspnea on exertion] : not dyspnea during exertion [Lower Ext Edema] : no extremity edema [Leg Claudication] : no intermittent leg claudication [Palpitations] : no palpitations [Orthopnea] : no orthopnea [PND] : no PND [Syncope] : no syncope [FreeTextEntry8] : Increased urinary frequency without any dysuria, pain, hematuria, fever [de-identified] : Balance abnormality

## 2022-12-14 NOTE — DISCUSSION/SUMMARY
[FreeTextEntry1] : Assessment and Suggestions:\par 89-year-old female with above medical history and active medical problems as noted below\par - chest pain: Recent hospital admission.  Negative cardiac work-up.  Most likely associated with gastroesophageal reflux disease/hiatus hernia.  Continue management of ischemic heart disease, and gastroesophageal duplex disease.  Small frequent meals.  Avoid recumbent position after eating.  Activity after meals were all reviewed with her.\par Coronary artery disease.  CABG.  Decrease aspirin to 81 mg.  Continue statin therapy\par -HTN.  Controlled.  Off antihypertensive therapy.  In presence of ankle edema.  Which most likely related to venous insufficiency rather than congestive heart failure.  \par -hyperlipidemia on Crestor, follow labs.\par -Non-rheumatic mitral and aortic insufficiency. Concentric LVH . mild pulmonary attention. Clinically, no signs of left or right heart failure. Continue to follow echo\par -PVD.  Carotid atherosclerosis.  Nonobstructive.  Continue aspirin and statin therapy.  No neurological event. \par -Gait and balance abnormality.  Safety precaution.  Physical therapy.  And use of cane.\par \par Counseling regarding low saturated fat, low carbohydrate intake was reviewed. Active lifestyle and regular. Exercise is along with weight maintenance is advised.\par I have reviewed above at length. I answered all the questions. Patient verbalized understanding\par Thank you very much for allowing me to participate in your patient's care. Please feel free to call me for any questions.\par \par Sincerely,\par \par Harpal Locke MD,FACC,JACKY\par

## 2022-12-14 NOTE — REASON FOR VISIT
[Symptom and Test Evaluation] : symptom and test evaluation [Hyperlipidemia] : hyperlipidemia [Hypertension] : hypertension [Coronary Artery Disease] : coronary artery disease [FreeTextEntry3] : Dr. Keller [FreeTextEntry1] : 89-year-old female comes in for follow-up consultation.\par  she has continued epigastric chest pressure.  More with meals.  Nonexertional.  No other associated symptoms with that.\par Remains fairly active limitation is arthritis.  Walks with a cane.\par There is no hemoptysis, hematemesis, melena, or black, tarry stool.\par She has no palpitations or syncopal episode.\par She has no visual disturbances or focal weakness.\par She does not have any increased caffeine or alcohol intake.\par Denies any recent hospital admission

## 2022-12-14 NOTE — HISTORY OF PRESENT ILLNESS
[FreeTextEntry1] : \par PMH as noted below:\par Coronary artery disease status post CABG, which includes LIMA to LAD. 4/2010 [facility]/[place].  Nuclear stress test from 10/17 with no significant perfusion defect. \par \par Essential hypertension:  Well controlled. \par \par History of having gastric ulcer with a GI bleed in December.  s/p transfusions. GI evaluation for PUD, followed with Dr. Ghosh in the past.\par \par Dyslipidemia. Remaining on statin.\par \par Moderate mitral regurgitation. Remaining stable.\par \par Mild pulmonary hypertension.  No CHF\par \par History of depression. improved.\par \par History of syncopal episode.  Implantable loop recorder. No significant findings.\par \par Bilateral moderate carotid stenosis.  Per past note; No significant change when evaluated in April 2015.  Remaining without neurological events \par

## 2023-02-25 ENCOUNTER — OFFICE (OUTPATIENT)
Dept: URBAN - METROPOLITAN AREA CLINIC 8 | Facility: CLINIC | Age: 88
Setting detail: OPHTHALMOLOGY
End: 2023-02-25
Payer: MEDICARE

## 2023-02-25 VITALS — HEIGHT: 55 IN

## 2023-02-25 DIAGNOSIS — H40.1131: ICD-10-CM

## 2023-02-25 DIAGNOSIS — Z96.1: ICD-10-CM

## 2023-02-25 DIAGNOSIS — H16.223: ICD-10-CM

## 2023-02-25 DIAGNOSIS — H26.492: ICD-10-CM

## 2023-02-25 PROCEDURE — 99213 OFFICE O/P EST LOW 20 MIN: CPT | Performed by: OPHTHALMOLOGY

## 2023-02-25 ASSESSMENT — REFRACTION_AUTOREFRACTION
OD_CYLINDER: -0.25
OS_CYLINDER: -0.75
OD_AXIS: 035
OS_SPHERE: -1.25
OD_SPHERE: +0.25
OS_AXIS: 094

## 2023-02-25 ASSESSMENT — KERATOMETRY
OD_AXISANGLE_DEGREES: 179
METHOD_AUTO_MANUAL: AUTO
OS_K1POWER_DIOPTERS: 42.50
OS_AXISANGLE_DEGREES: 005
OD_K2POWER_DIOPTERS: 44.50
OD_K1POWER_DIOPTERS: 42.50
OS_K2POWER_DIOPTERS: 44.25

## 2023-02-25 ASSESSMENT — REFRACTION_MANIFEST
OS_AXIS: 095
OD_VA2: 20/20(J1+)
OD_ADD: +2.50
OD_VA2: 20/20(J1+)
OD_VA1: 20/20-2
OS_VA1: 20/20-2
OS_SPHERE: -1.00
OD_ADD: +2.50
OU_VA: 20/20
OS_VA2: 20/20(J1+)
OS_AXIS: 095
OS_ADD: +2.50
OD_SPHERE: PLANO
OS_VA2: 20/20(J1+)
OU_VA: 20/20
OD_VA1: 20/20-2
OD_SPHERE: PLANO
OS_SPHERE: -1.00
OS_ADD: +2.50
OS_CYLINDER: -0.50
OS_VA1: 20/20-2
OD_CYLINDER: SPH
OS_CYLINDER: -0.50

## 2023-02-25 ASSESSMENT — AXIALLENGTH_DERIVED
OS_AL: 24.291
OS_AL: 24.1371
OS_AL: 24.1371
OD_AL: 23.5433

## 2023-02-25 ASSESSMENT — REFRACTION_CURRENTRX
OS_CYLINDER: -0.50
OD_VPRISM_DIRECTION: SV
OS_OVR_VA: 20/
OD_CYLINDER: SPHERE
OS_VPRISM_DIRECTION: SV
OS_AXIS: 058
OD_SPHERE: +2.50
OD_OVR_VA: 20/
OS_SPHERE: +1.00

## 2023-02-25 ASSESSMENT — CONFRONTATIONAL VISUAL FIELD TEST (CVF)
OD_FINDINGS: FULL
OS_FINDINGS: FULL

## 2023-02-25 ASSESSMENT — VISUAL ACUITY
OD_BCVA: 20/100
OS_BCVA: 20/20-2

## 2023-02-25 ASSESSMENT — SPHEQUIV_DERIVED
OS_SPHEQUIV: -1.25
OS_SPHEQUIV: -1.625
OS_SPHEQUIV: -1.25
OD_SPHEQUIV: 0.125

## 2023-03-01 ENCOUNTER — APPOINTMENT (OUTPATIENT)
Dept: CARDIOLOGY | Facility: CLINIC | Age: 88
End: 2023-03-01
Payer: MEDICARE

## 2023-03-01 VITALS
BODY MASS INDEX: 22.99 KG/M2 | SYSTOLIC BLOOD PRESSURE: 120 MMHG | HEIGHT: 65 IN | OXYGEN SATURATION: 90 % | HEART RATE: 65 BPM | WEIGHT: 138 LBS | DIASTOLIC BLOOD PRESSURE: 50 MMHG

## 2023-03-01 DIAGNOSIS — I08.0 RHEUMATIC DISORDERS OF BOTH MITRAL AND AORTIC VALVES: ICD-10-CM

## 2023-03-01 DIAGNOSIS — R94.31 ABNORMAL ELECTROCARDIOGRAM [ECG] [EKG]: ICD-10-CM

## 2023-03-01 DIAGNOSIS — I49.9 CARDIAC ARRHYTHMIA, UNSPECIFIED: ICD-10-CM

## 2023-03-01 PROCEDURE — 99214 OFFICE O/P EST MOD 30 MIN: CPT

## 2023-03-01 PROCEDURE — 93246 EXT ECG>7D<15D RECORDING: CPT | Mod: 59

## 2023-03-01 RX ORDER — LATANOPROST/PF 0.005 %
0.01 DROPS OPHTHALMIC (EYE)
Qty: 10 | Refills: 3 | Status: DISCONTINUED | COMMUNITY
Start: 2017-02-02 | End: 2023-03-01

## 2023-03-01 RX ORDER — ROSUVASTATIN CALCIUM 10 MG/1
10 TABLET, FILM COATED ORAL
Qty: 90 | Refills: 3 | Status: DISCONTINUED | COMMUNITY
Start: 2019-03-20 | End: 2023-03-01

## 2023-03-01 RX ORDER — MULTIVIT-MIN/FOLIC/VIT K/LYCOP 400-300MCG
250 TABLET ORAL
Refills: 0 | Status: DISCONTINUED | COMMUNITY
End: 2023-03-01

## 2023-03-01 NOTE — HISTORY OF PRESENT ILLNESS
Consent: Written consent obtained, risks reviewed including but not limited to crusting, scabbing, blistering, scarring, darker or lighter pigmentary change, incidental hair removal, bruising, and/or incomplete removal. [FreeTextEntry1] : BOOM HOLGUIN  is a 89 year F  who presents today Mar 01, 2023 in clinical follow-up from recent admission to [facility]. Presented with complaint of nausea, dry heaving and possible syncopal event. While monitored on telemetry found to have new onset Afib which spontaneously converted to SR. Patient is asymptomatic from arrhythmia standpoint. Was not started on AC. Cardiology follow-up recommended for outpatient [facility]. \par Main complaint is difficulty with hiatal hernia and nausea. \par Walking with a cane with no recent falls.\par Off most cardiac medications at patients request. Patient states she is not taking Crestor and hasn’t taken for years. Not taking any antihypertensive medication and BP well controlled on my assessment.\par On ASA 81mg QD with no complaints of bleeding\par \par \par PMH as noted below:\par Coronary artery disease status post CABG, which includes LIMA to LAD. 4/2010 [facility]/[place].  Nuclear stress test from 10/17 with no significant perfusion defect. \par \par Essential hypertension:  Well controlled. \par \par History of having gastric ulcer with a GI bleed in December.  s/p transfusions. GI evaluation for PUD, followed with Dr. Ghosh in the past.\par \par Dyslipidemia. Remaining on statin.\par \par Moderate mitral regurgitation. Remaining stable.\par \par Mild pulmonary hypertension.  No CHF\par \par History of depression. improved.\par \par History of syncopal episode.  Implantable loop recorder. No significant findings.\par \par Bilateral moderate carotid stenosis.  Per past note; No significant change when evaluated in April 2015.  Remaining without neurological events \par

## 2023-03-01 NOTE — DISCUSSION/SUMMARY
[FreeTextEntry1] : BOOM HOLGUIN  is a 89 year F  who presents today Mar 01, 2023 with the above history and the following active issues. \par \par HTN.  Controlled.  Off antihypertensive therapy.  In presence of ankle edema.  Which most likely related to venous insufficiency rather than congestive heart failure.  \par \par Hyperlipidemia. Refusing statin at this time\par Lifestyle and risk factor modification\par \par Non-rheumatic mitral and aortic insufficiency. Concentric LVH . mild pulmonary attention. Clinically, no signs of left or right heart failure. Continue to follow echo\par \par PVD.  Carotid atherosclerosis.  Nonobstructive.  Continue aspirin and statin therapy.  No neurological event. \par \par Gait and balance abnormality.  Safety precaution.  Physical therapy.  And use of cane.\par \par PAF on EKG from [facility]. Recommend 2 week UZMA - LIVE monitor recommended \par Currently not on AC on ASA. \par \par Red flag symptoms which would warrant sooner emergent evaluation reviewed with the patient. \par Questions and concerns were addressed and answered. \par Limitations of non-invasive testing reviewed\par \par Sincerely,\par \par Ema Slater PA-C\par Patients history, testing and plan reviewed with supervising MD: Dr. Patrick Valentino

## 2023-03-01 NOTE — CARDIOLOGY SUMMARY
[de-identified] : nsr low voltage complexes poor R wave progression.  June 7, 2020\par 1/14/2022.  Normal sinus rhythm.  Poor R wave progression. [de-identified] : January 9, 2020.  EF 60 mild mitral regurgitation RVSP 45\par May 24, 2022..  LVEF 60% mild mitral and aortic regurgitation [de-identified] : Bilateral carotid Doppler study.  May 24, 2022 mild nonobstructive disease

## 2023-03-01 NOTE — REVIEW OF SYSTEMS
[Feeling Fatigued] : feeling fatigued [Chest Discomfort] : chest discomfort [Heartburn] : heartburn [Joint Pain] : joint pain [Joint Swelling] : joint swelling [Joint Stiffness] : joint stiffness [Negative] : Heme/Lymph [SOB] : no shortness of breath [Dyspnea on exertion] : not dyspnea during exertion [Lower Ext Edema] : no extremity edema [Leg Claudication] : no intermittent leg claudication [Palpitations] : no palpitations [Orthopnea] : no orthopnea [PND] : no PND [Syncope] : no syncope [FreeTextEntry8] : Increased urinary frequency without any dysuria, pain, hematuria, fever [de-identified] : Balance abnormality

## 2023-03-01 NOTE — REASON FOR VISIT
[Symptom and Test Evaluation] : symptom and test evaluation [Hyperlipidemia] : hyperlipidemia [Hypertension] : hypertension [Coronary Artery Disease] : coronary artery disease [FreeTextEntry3] : Dr. Keller

## 2023-03-02 ENCOUNTER — APPOINTMENT (OUTPATIENT)
Dept: CARDIOLOGY | Facility: CLINIC | Age: 88
End: 2023-03-02

## 2023-03-09 ENCOUNTER — NON-APPOINTMENT (OUTPATIENT)
Age: 88
End: 2023-03-09

## 2023-03-15 ENCOUNTER — APPOINTMENT (OUTPATIENT)
Dept: CARDIOLOGY | Facility: CLINIC | Age: 88
End: 2023-03-15
Payer: MEDICARE

## 2023-03-15 VITALS
HEART RATE: 57 BPM | HEIGHT: 65 IN | WEIGHT: 137 LBS | SYSTOLIC BLOOD PRESSURE: 120 MMHG | OXYGEN SATURATION: 90 % | BODY MASS INDEX: 22.82 KG/M2 | DIASTOLIC BLOOD PRESSURE: 56 MMHG

## 2023-03-15 DIAGNOSIS — K21.9 GASTRO-ESOPHAGEAL REFLUX DISEASE W/OUT ESOPHAGITIS: ICD-10-CM

## 2023-03-15 DIAGNOSIS — F41.9 ANXIETY DISORDER, UNSPECIFIED: ICD-10-CM

## 2023-03-15 PROCEDURE — 99215 OFFICE O/P EST HI 40 MIN: CPT

## 2023-03-15 NOTE — ASSESSMENT
[FreeTextEntry1] : Past tests for reference.\par cardiac catheterization 1-19-11 distal left main 50%, mid LAD 70% (LIMa-LAD normal), left circ luminal irregularities, mid RCA 90%, 75%, distal RCA 90% (SVG-RCA luminal irregularities), SVG-OM 10%\par \par echo 12/7/15; 60% lse calcified av/mv, mild mr.tr pasp 34 mm of hg.\par \par EKG ordered and interpreted by me on October 12, 2017 indication chest pain. Known coronary artery disease. Interpretation sinus bradycardia. Rightward axis septal infarct. Nonspecific ST-T changes.\par \par echo 12-1-16 ef 60% with normal ventricular function, mild diatolic dysfunction, mild GA/MR, aneurysmal inter-atrial septum, c/w borderline PHTN pulmonary  pressures 38mmhg, moderate LAE \par \par carotid ultrasound 8-25-16 disease with BERNARDA/CCA ratio is pete due to low CCA velocities. Decrease in LICA/CCA ratio noted. \par \par nuclear stress test 10-13-17 lexiscan equivocal for ischemia by ekg with no evidence of infarction or inducible ischemia.\par \par labs 10-11-17 wbc 8.6, h/h 12.2/36.8, plat 208, Na+ 141, K 4.7, bun/creat 21/1.14\par \par EKG ordered and interpreted by me on March 20 2018. The patient has been interpretation coronary artery disease. In addition to a normal sinus rhythm. Baseline. The progression. No acute ST-T changes.\par \par Reviewed on November 15, 2018.\par Labs from October 24, 2018 were reviewed.\par Echocardiogram November 12, 2018 LV ejection fraction 60%. Mild aortic stenosis. Mild mitral regurgitation.\par Carotid Doppler study November 12, 2018. Nonobstructive disease \par EKG November 15, 2018. Indication coronary artery disease. Interpretation normal sinus rhythm. 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 No significant new changes. \par \par Reviewed on January 9, 2020.\par Echocardiogram January 9, 2001 EF around 60% with mild mitral regurgitation. Mild aortic regurgitation. LVH with pulmonary pressures of 45 mm mercury. A slight increase in pulmonary pressures noted before.\par Labs from January 7, 2020 were reviewed. Hemoglobin 11.0, platelet count 238.\par CMP, stable on January 7, 2020.\par November 2019. Labs had shown stable. Lipid panel.\par \par Reviewed July 28, 2020\par Labs from January 7, 2020 and May 28, 2020 were reviewed.\par \par EKG normal sinus rhythm low voltage poor R wave progression\par Labs from April 2021 were reviewed excellent lipid panel otherwise stable labs elevated white count\par \par Reviewed on November 1, 2021.\par Labs from July 16, 2021 were reviewed.\par \par reviewed  2/9/22\par Labs from October 20, 2021 were reviewed\par \par Reviewed on Jasmina 15, 2022.\par EKG as noted above\par Labs from October 2021 were reviewed again.\par Echocardiogram May 24, 2022 EF 60% mild mitral and aortic regurgitation.  Carotid Doppler study May 24, 2022 mild nonobstructive disease\par \par Reviewed on September 12, 2022.\par Hospital data from September 1, 2022 reviewed.  Which includes EKG labs cardiology consult admit note discharge summary.\par \par Reviewed on March 15, 2023.\par Blood pressure readings reviewed\par Labs from February 17, 2023 and hospital data were reviewed.\par

## 2023-03-15 NOTE — REVIEW OF SYSTEMS
[Feeling Fatigued] : feeling fatigued [Chest Discomfort] : chest discomfort [Heartburn] : heartburn [Joint Pain] : joint pain [Joint Swelling] : joint swelling [Joint Stiffness] : joint stiffness [Negative] : Heme/Lymph [SOB] : no shortness of breath [Dyspnea on exertion] : dyspnea during exertion [Lower Ext Edema] : no extremity edema [Leg Claudication] : no intermittent leg claudication [Palpitations] : palpitations [Orthopnea] : no orthopnea [PND] : no PND [Syncope] : no syncope [FreeTextEntry5] : As per HPI [FreeTextEntry8] : Increased urinary frequency without any dysuria, pain, hematuria, fever [de-identified] : Balance abnormality

## 2023-03-15 NOTE — CARDIOLOGY SUMMARY
[de-identified] : nsr low voltage complexes poor R wave progression.  June 7, 2020\par 1/14/2022.  Normal sinus rhythm.  Poor R wave progression. [de-identified] : Available information on event recorder reviewed [de-identified] : January 9, 2020.  EF 60 mild mitral regurgitation RVSP 45\par May 24, 2022..  LVEF 60% mild mitral and aortic regurgitation [de-identified] : Bilateral carotid Doppler study.  May 24, 2022 mild nonobstructive disease

## 2023-03-15 NOTE — DISCUSSION/SUMMARY
[FreeTextEntry1] : Assessment and Suggestions:\par 89-year-old female with above medical history and active medical problems as noted below\par -Paroxysmal atrial fibrillation.  Follow event monitor.  Not on anticoagulation because of high risk for bleeding complication, history of GI bleed in the past, high risk for falls.  Understands risk benefits alternatives at present.  If high atrial fibrillation burden will discuss regarding anticoagulation versus no anticoagulation versus watchman's device.\par - chest pain: As needed nitroglycerin.\par Coronary artery disease.  CABG.  Decrease aspirin to 81 mg.  Significant GI symptoms off statin therapy.\par -HTN.  Labile.  As needed use of amlodipine considering intermittent low blood pressure with symptoms of dizziness.  Avoid falls.\par -hyperlipidemia on Crestor, follow labs.\par -Non-rheumatic mitral and aortic insufficiency. Concentric LVH . mild pulmonary attention. Clinically, no signs of left or right heart failure. \par -PVD.  Carotid atherosclerosis.  Nonobstructive.  Continue aspirin and statin therapy.  No neurological event. \par -Gait and balance abnormality.  Safety precaution.  Physical therapy.  And use of cane.\par -Anxiety and stress.  Sertraline restarted at 25 mg which helped her in the past\par -Nausea.  Hiatus hernia.  Small meals.  Zofran as needed\par \par Counseling regarding low saturated fat, low carbohydrate intake was reviewed. Active lifestyle and regular. Exercise is along with weight maintenance is advised.\par I have reviewed above at length. I answered all the questions. Patient verbalized understanding\par Thank you very much for allowing me to participate in your patient's care. Please feel free to call me for any questions.\par \par Sincerely,\par \par Harpal Locke MD,FACC,FASE\par

## 2023-03-15 NOTE — REASON FOR VISIT
[Symptom and Test Evaluation] : symptom and test evaluation [Hyperlipidemia] : hyperlipidemia [Hypertension] : hypertension [Coronary Artery Disease] : coronary artery disease [FreeTextEntry3] : Dr. Keller [FreeTextEntry1] : 89-year-old female comes in for follow-up consultation to review further management of her paroxysmal atrial fibrillation and labile hypertension.  She is here with her nurse.\par She has had intermittent episodes of atrial fibrillation.  She is and she has had lower and high blood pressure requiring as needed use of amlodipine.  She has continued epigastric discomfort.  And nausea requiring use of Zofran.  She has been eating small meals.\par As needed amlodipine for blood pressure greater than 170/100 or symptoms.\par Improved fatigue and tiredness.\par She is anxious about it.\par I have again reviewed some of her hospital information.\par She has had possible anginal symptoms requiring use of nitroglycerin.\par Remains fairly active limitation is arthritis.  Walks with a cane.\par There is no hemoptysis, hematemesis, melena, or black, tarry stool.\par She has no visual disturbances or focal weakness.\par She does not have any increased caffeine or alcohol intake.\par Denies any recent hospital admission

## 2023-03-17 ENCOUNTER — NON-APPOINTMENT (OUTPATIENT)
Age: 88
End: 2023-03-17

## 2023-03-20 ENCOUNTER — APPOINTMENT (OUTPATIENT)
Dept: CARDIOLOGY | Facility: CLINIC | Age: 88
End: 2023-03-20
Payer: MEDICARE

## 2023-03-20 PROCEDURE — 93248 EXT ECG>7D<15D REV&INTERPJ: CPT

## 2023-03-29 ENCOUNTER — APPOINTMENT (OUTPATIENT)
Dept: CARDIOLOGY | Facility: CLINIC | Age: 88
End: 2023-03-29
Payer: MEDICARE

## 2023-03-29 ENCOUNTER — NON-APPOINTMENT (OUTPATIENT)
Age: 88
End: 2023-03-29

## 2023-03-29 VITALS
HEART RATE: 63 BPM | OXYGEN SATURATION: 92 % | WEIGHT: 130 LBS | DIASTOLIC BLOOD PRESSURE: 60 MMHG | BODY MASS INDEX: 21.66 KG/M2 | HEIGHT: 65 IN | SYSTOLIC BLOOD PRESSURE: 122 MMHG

## 2023-03-29 DIAGNOSIS — K44.9 DIAPHRAGMATIC HERNIA W/OUT OBSTRUCTION OR GANGRENE: ICD-10-CM

## 2023-03-29 DIAGNOSIS — R11.0 NAUSEA: ICD-10-CM

## 2023-03-29 PROCEDURE — 99215 OFFICE O/P EST HI 40 MIN: CPT

## 2023-03-29 PROCEDURE — 93000 ELECTROCARDIOGRAM COMPLETE: CPT

## 2023-03-29 RX ORDER — CYANOCOBALAMIN
1000 KIT
Refills: 0 | Status: DISCONTINUED | COMMUNITY
End: 2023-03-29

## 2023-03-29 RX ORDER — OMEPRAZOLE 40 MG/1
40 CAPSULE, DELAYED RELEASE ORAL
Qty: 90 | Refills: 3 | Status: DISCONTINUED | COMMUNITY
Start: 2022-02-09 | End: 2023-03-29

## 2023-03-29 RX ORDER — LATANOPROST/PF 0.005 %
0.01 DROPS OPHTHALMIC (EYE)
Refills: 0 | Status: ACTIVE | COMMUNITY

## 2023-03-29 RX ORDER — ISOSORBIDE MONONITRATE 30 MG/1
30 TABLET, EXTENDED RELEASE ORAL DAILY
Qty: 30 | Refills: 3 | Status: DISCONTINUED | COMMUNITY
Start: 2023-03-17 | End: 2023-03-29

## 2023-03-29 RX ORDER — VIBEGRON 75 MG/1
75 TABLET, FILM COATED ORAL DAILY
Refills: 0 | Status: DISCONTINUED | COMMUNITY
End: 2023-03-29

## 2023-03-29 RX ORDER — AMLODIPINE BESYLATE 2.5 MG/1
2.5 TABLET ORAL AS DIRECTED
Qty: 90 | Refills: 3 | Status: DISCONTINUED | COMMUNITY
Start: 2023-03-15 | End: 2023-03-29

## 2023-03-29 NOTE — HISTORY OF PRESENT ILLNESS
[FreeTextEntry1] : \par PMH as noted below:\par Coronary artery disease status post CABG, which includes LIMA to LAD. 4/2010 [facility].  Nuclear stress test from 10/17 with no significant perfusion defect. \par \par Essential hypertension:  Well controlled. \par \par History of having gastric ulcer with a GI bleed in December.  s/p transfusions. GI evaluation for PUD, followed with Dr. Ghosh in the past.\par \par Dyslipidemia. Remaining on statin.\par \par Moderate mitral regurgitation. Remaining stable.\par \par Mild pulmonary hypertension.  No CHF\par \par History of depression. improved.\par \par History of syncopal episode.  Implantable loop recorder. No significant findings.\par \par Bilateral moderate carotid stenosis.  Per past note; No significant change when evaluated in April 2015.  Remaining without neurological events \par

## 2023-03-29 NOTE — DISCUSSION/SUMMARY
[FreeTextEntry1] : Assessment and Suggestions:\par 89-year-old female with above medical history and active medical problems as noted below\par -Hiatus hernia.  On the morning nausea.  Constipation.  Most likely her chest pain syndrome is more related to gastroesophageal reflux disease rather than unstable CAD.  Recommended to take Zofran early in the morning.  Small meals.  Recline position for sleeping.  Constipation management.  Consider GI evaluation.  She would be at a higher risk for surgical intervention which was reviewed with her at her request.\par If needed consider Reglan as a trial in place of Zofran.\par -Paroxysmal atrial fibrillation.  Follow event monitor.  Not on anticoagulation because of high risk for bleeding complication, history of GI bleed in the past, high risk for falls.  Understands risk benefits alternatives at present.  If high atrial fibrillation burden will discuss regarding anticoagulation versus no anticoagulation versus watchman's device.\par - chest pain: As needed nitroglycerin.\par Coronary artery disease.  CABG.  Decrease aspirin to 81 mg.  Significant GI symptoms off statin therapy.\par -HTN.  Labile.  As needed use of amlodipine considering intermittent low blood pressure with symptoms of dizziness.  Avoid falls.\par -hyperlipidemia on Crestor, follow labs.\par -Non-rheumatic mitral and aortic insufficiency. Concentric LVH . mild pulmonary attention. Clinically, no signs of left or right heart failure. \par -PVD.  Carotid atherosclerosis.  Nonobstructive.  Continue aspirin and statin therapy.  No neurological event. \par -Gait and balance abnormality.  Safety precaution.  Physical therapy.  And use of cane.\par -Anxiety and stress.  Sertraline restarted at 25 mg which helped her in the past\par \par \par Counseling regarding low saturated fat, low carbohydrate intake was reviewed. Active lifestyle and regular. Exercise is along with weight maintenance is advised.\par I have reviewed above at length. I answered all the questions. Patient verbalized understanding\par Thank you very much for allowing me to participate in your patient's care. Please feel free to call me for any questions.\par \par Sincerely,\par \par Harpal Locke MD,FACC,JACKY\par

## 2023-03-29 NOTE — REVIEW OF SYSTEMS
[Feeling Fatigued] : feeling fatigued [Dyspnea on exertion] : dyspnea during exertion [Chest Discomfort] : chest discomfort [Palpitations] : palpitations [Heartburn] : heartburn [Joint Pain] : joint pain [Joint Swelling] : joint swelling [Joint Stiffness] : joint stiffness [Negative] : Heme/Lymph [SOB] : no shortness of breath [Lower Ext Edema] : no extremity edema [Leg Claudication] : no intermittent leg claudication [Orthopnea] : no orthopnea [PND] : no PND [Syncope] : no syncope [FreeTextEntry5] : As per HPI [FreeTextEntry8] : Increased urinary frequency without any dysuria, pain, hematuria, fever [de-identified] : Balance abnormality

## 2023-03-29 NOTE — REASON FOR VISIT
[Symptom and Test Evaluation] : symptom and test evaluation [Hyperlipidemia] : hyperlipidemia [Coronary Artery Disease] : coronary artery disease [Hypertension] : hypertension [FreeTextEntry3] : Dr. Keller [FreeTextEntry1] : 89-year-old female comes in for follow-up consultation to review further management of her paroxysmal atrial fibrillation and labile hypertension.  She is here with her nurse.  Also recent hospital admission with extensive review of the data.  She was admitted with chest pain.  She had CT of the chest ruled out PE.  She had labs which were stable no myocardial infarction.  Her EKGs did not reveal any significant new changes.\par She is now prescribed also home oxygen level.\par Her constipation is improved but still early morning nausea is persistent.\par She has had intermittent episodes of atrial fibrillation.  She is and she has had lower and high blood pressure requiring as needed use of amlodipine.  She has continued epigastric discomfort.  And nausea requiring use of Zofran.  She has been eating small meals.\par As needed amlodipine for blood pressure greater than 170/100 or symptoms.\par Improved fatigue and tiredness.\par She is anxious about it.\par She has had possible anginal symptoms requiring use of nitroglycerin.\par Remains fairly active limitation is arthritis.  Walks with a cane.\par There is no hemoptysis, hematemesis, melena, or black, tarry stool.\par She has no visual disturbances or focal weakness.\par She does not have any increased caffeine or alcohol intake.\par Denies any recent hospital admission

## 2023-03-29 NOTE — CARDIOLOGY SUMMARY
[de-identified] : nsr low voltage complexes poor R wave progression.  June 7, 2020\par 1/14/2022.  Normal sinus rhythm.  Poor R wave progression.\par March 29, 2023 normal sinus rhythm poor R wave progression normal intervals [de-identified] : Available information on event recorder reviewed [de-identified] : January 9, 2020.  EF 60 mild mitral regurgitation RVSP 45\par May 24, 2022..  LVEF 60% mild mitral and aortic regurgitation [de-identified] : Bilateral carotid Doppler study.  May 24, 2022 mild nonobstructive disease

## 2023-03-29 NOTE — PHYSICAL EXAM
[General Appearance - Well Developed] : well developed [Normal Appearance] : normal appearance [Well Groomed] : well groomed [General Appearance - Well Nourished] : well nourished [General Appearance - In No Acute Distress] : no acute distress [No Deformities] : no deformities [] : no respiratory distress [Respiration, Rhythm And Depth] : normal respiratory rhythm and effort [Exaggerated Use Of Accessory Muscles For Inspiration] : no accessory muscle use [Auscultation Breath Sounds / Voice Sounds] : lungs were clear to auscultation bilaterally [Heart Rate And Rhythm] : heart rate and rhythm were normal [Heart Sounds] : normal S1 and S2 [Arterial Pulses Normal] : the arterial pulses were normal [Veins - Varicosity Changes] : no varicosital changes were noted in the lower extremities [Nail Clubbing] : no clubbing of the fingernails [Cyanosis, Localized] : no localized cyanosis [Oriented To Time, Place, And Person] : oriented to person, place, and time [Affect] : the affect was normal [Mood] : the mood was normal [No Anxiety] : not feeling anxious [FreeTextEntry1] : Part 2/6 left breast region. trace edema

## 2023-03-29 NOTE — ASSESSMENT
[FreeTextEntry1] : Past tests for reference.\par cardiac catheterization 1-19-11 distal left main 50%, mid LAD 70% (LIMa-LAD normal), left circ luminal irregularities, mid RCA 90%, 75%, distal RCA 90% (SVG-RCA luminal irregularities), SVG-OM 10%\par \par echo 12/7/15; 60% lse calcified av/mv, mild mr.tr pasp 34 mm of hg.\par \par EKG ordered and interpreted by me on October 12, 2017 indication chest pain. Known coronary artery disease. Interpretation sinus bradycardia. Rightward axis septal infarct. Nonspecific ST-T changes.\par \par echo 12-1-16 ef 60% with normal ventricular function, mild diatolic dysfunction, mild IN/MR, aneurysmal inter-atrial septum, c/w borderline PHTN pulmonary  pressures 38mmhg, moderate LAE \par \par carotid ultrasound 8-25-16 disease with BERNARDA/CCA ratio is pete due to low CCA velocities. Decrease in LICA/CCA ratio noted. \par \par nuclear stress test 10-13-17 lexiscan equivocal for ischemia by ekg with no evidence of infarction or inducible ischemia.\par \par labs 10-11-17 wbc 8.6, h/h 12.2/36.8, plat 208, Na+ 141, K 4.7, bun/creat 21/1.14\par \par EKG ordered and interpreted by me on March 20 2018. The patient has been interpretation coronary artery disease. In addition to a normal sinus rhythm. Baseline. The progression. No acute ST-T changes.\par \par Reviewed on November 15, 2018.\par Labs from October 24, 2018 were reviewed.\par Echocardiogram November 12, 2018 LV ejection fraction 60%. Mild aortic stenosis. Mild mitral regurgitation.\par Carotid Doppler study November 12, 2018. Nonobstructive disease \par EKG November 15, 2018. Indication coronary artery disease. Interpretation normal sinus rhythm. 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 No significant new changes. \par \par Reviewed on January 9, 2020.\par Echocardiogram January 9, 2001 EF around 60% with mild mitral regurgitation. Mild aortic regurgitation. LVH with pulmonary pressures of 45 mm mercury. A slight increase in pulmonary pressures noted before.\par Labs from January 7, 2020 were reviewed. Hemoglobin 11.0, platelet count 238.\par CMP, stable on January 7, 2020.\par November 2019. Labs had shown stable. Lipid panel.\par \par Reviewed July 28, 2020\par Labs from January 7, 2020 and May 28, 2020 were reviewed.\par \par EKG normal sinus rhythm low voltage poor R wave progression\par Labs from April 2021 were reviewed excellent lipid panel otherwise stable labs elevated white count\par \par Reviewed on November 1, 2021.\par Labs from July 16, 2021 were reviewed.\par \par reviewed  2/9/22\par Labs from October 20, 2021 were reviewed\par \par Reviewed on Jasmina 15, 2022.\par EKG as noted above\par Labs from October 2021 were reviewed again.\par Echocardiogram May 24, 2022 EF 60% mild mitral and aortic regurgitation.  Carotid Doppler study May 24, 2022 mild nonobstructive disease\par \par Reviewed on September 12, 2022.\par Hospital data from September 1, 2022 reviewed.  Which includes EKG labs cardiology consult admit note discharge summary.\par \par Reviewed on March 15, 2023.\par Blood pressure readings reviewed\par Labs from February 17, 2023 and hospital data were reviewed.\par \par March 29, 2023\par Hospital data and EKG from today reviewed

## 2023-04-26 ENCOUNTER — APPOINTMENT (OUTPATIENT)
Dept: CARDIOLOGY | Facility: CLINIC | Age: 88
End: 2023-04-26

## 2023-05-16 RX ORDER — ASPIRIN ENTERIC COATED TABLETS 81 MG 81 MG/1
81 TABLET, DELAYED RELEASE ORAL DAILY
Qty: 90 | Refills: 1 | Status: ACTIVE | COMMUNITY
Start: 2021-06-07 | End: 1900-01-01

## 2023-05-17 ENCOUNTER — OFFICE (OUTPATIENT)
Dept: URBAN - METROPOLITAN AREA CLINIC 8 | Facility: CLINIC | Age: 88
Setting detail: OPHTHALMOLOGY
End: 2023-05-17

## 2023-05-17 ENCOUNTER — OFFICE (OUTPATIENT)
Dept: URBAN - METROPOLITAN AREA CLINIC 8 | Facility: CLINIC | Age: 88
Setting detail: OPHTHALMOLOGY
End: 2023-05-17
Payer: MEDICARE

## 2023-05-17 ENCOUNTER — APPOINTMENT (OUTPATIENT)
Dept: CARDIOLOGY | Facility: CLINIC | Age: 88
End: 2023-05-17

## 2023-05-17 DIAGNOSIS — H90.3: ICD-10-CM

## 2023-05-17 DIAGNOSIS — H35.363: ICD-10-CM

## 2023-05-17 DIAGNOSIS — Z96.1: ICD-10-CM

## 2023-05-17 DIAGNOSIS — H16.223: ICD-10-CM

## 2023-05-17 DIAGNOSIS — H26.492: ICD-10-CM

## 2023-05-17 DIAGNOSIS — H40.1131: ICD-10-CM

## 2023-05-17 PROCEDURE — 92593 HEARING AID CHECK; BINAURAL: CPT | Performed by: AUDIOLOGIST

## 2023-05-17 PROCEDURE — 92133 CPTRZD OPH DX IMG PST SGM ON: CPT | Performed by: OPHTHALMOLOGY

## 2023-05-17 PROCEDURE — 99213 OFFICE O/P EST LOW 20 MIN: CPT | Performed by: OPHTHALMOLOGY

## 2023-05-17 ASSESSMENT — REFRACTION_MANIFEST
OU_VA: 20/20
OS_AXIS: 095
OD_VA1: 20/20-2
OD_ADD: +2.50
OU_VA: 20/20
OD_VA2: 20/20(J1+)
OS_VA1: 20/20-2
OS_VA2: 20/20(J1+)
OS_VA2: 20/20(J1+)
OS_ADD: +2.50
OS_SPHERE: -1.00
OS_SPHERE: -1.00
OD_VA1: 20/20-2
OD_SPHERE: PLANO
OS_CYLINDER: -0.50
OS_ADD: +2.50
OD_SPHERE: PLANO
OS_VA1: 20/20-2
OS_AXIS: 095
OD_VA2: 20/20(J1+)
OD_CYLINDER: SPH
OS_CYLINDER: -0.50
OD_ADD: +2.50

## 2023-05-17 ASSESSMENT — REFRACTION_CURRENTRX
OS_OVR_VA: 20/
OD_CYLINDER: SPHERE
OS_VPRISM_DIRECTION: SV
OS_AXIS: 058
OD_OVR_VA: 20/
OD_SPHERE: +2.50
OS_SPHERE: +1.00
OS_CYLINDER: -0.50
OD_VPRISM_DIRECTION: SV

## 2023-05-17 ASSESSMENT — KERATOMETRY
OS_AXISANGLE_DEGREES: 005
OS_K2POWER_DIOPTERS: 44.25
OD_K1POWER_DIOPTERS: 42.50
OD_K2POWER_DIOPTERS: 44.50
METHOD_AUTO_MANUAL: AUTO
OD_AXISANGLE_DEGREES: 179
OS_K1POWER_DIOPTERS: 42.50

## 2023-05-17 ASSESSMENT — REFRACTION_AUTOREFRACTION
OD_CYLINDER: -0.25
OS_CYLINDER: -0.75
OS_AXIS: 094
OD_AXIS: 035
OD_SPHERE: +0.25
OS_SPHERE: -1.25

## 2023-05-17 ASSESSMENT — TONOMETRY
OD_IOP_MMHG: 10
OS_IOP_MMHG: 11

## 2023-05-17 ASSESSMENT — VISUAL ACUITY
OD_BCVA: 20/100
OS_BCVA: 20/20-

## 2023-05-17 ASSESSMENT — SPHEQUIV_DERIVED
OS_SPHEQUIV: -1.25
OS_SPHEQUIV: -1.625
OS_SPHEQUIV: -1.25
OD_SPHEQUIV: 0.125

## 2023-05-17 ASSESSMENT — CONFRONTATIONAL VISUAL FIELD TEST (CVF)
OD_FINDINGS: FULL
OS_FINDINGS: FULL

## 2023-05-17 ASSESSMENT — AXIALLENGTH_DERIVED
OD_AL: 23.5433
OS_AL: 24.1371
OS_AL: 24.1371
OS_AL: 24.291

## 2023-05-17 ASSESSMENT — PACHYMETRY
OD_CT_CORRECTION: 4
OS_CT_CORRECTION: 3
OS_CT_UM: 500
OD_CT_UM: 490

## 2023-05-17 ASSESSMENT — TEAR BREAK UP TIME (TBUT)
OD_TBUT: 8 SEC
OS_TBUT: 8 SEC

## 2023-06-23 ENCOUNTER — APPOINTMENT (OUTPATIENT)
Dept: CARDIOLOGY | Facility: CLINIC | Age: 88
End: 2023-06-23
Payer: MEDICARE

## 2023-08-07 ENCOUNTER — APPOINTMENT (OUTPATIENT)
Dept: CARDIOLOGY | Facility: CLINIC | Age: 88
End: 2023-08-07
Payer: MEDICARE

## 2023-08-07 VITALS
OXYGEN SATURATION: 97 % | DIASTOLIC BLOOD PRESSURE: 60 MMHG | HEART RATE: 53 BPM | BODY MASS INDEX: 23.66 KG/M2 | RESPIRATION RATE: 14 BRPM | SYSTOLIC BLOOD PRESSURE: 136 MMHG | HEIGHT: 65 IN | WEIGHT: 142 LBS

## 2023-08-07 PROCEDURE — 99214 OFFICE O/P EST MOD 30 MIN: CPT

## 2023-08-07 RX ORDER — ERGOCALCIFEROL 1.25 MG/1
1.25 MG CAPSULE, LIQUID FILLED ORAL
Qty: 8 | Refills: 0 | Status: DISCONTINUED | COMMUNITY
Start: 2020-07-24 | End: 2023-08-07

## 2023-08-07 RX ORDER — DOCUSATE SODIUM 100 MG/1
100 CAPSULE ORAL
Refills: 0 | Status: DISCONTINUED | COMMUNITY
End: 2023-08-07

## 2023-08-07 NOTE — REASON FOR VISIT
[Symptom and Test Evaluation] : symptom and test evaluation [Hyperlipidemia] : hyperlipidemia [Hypertension] : hypertension [Coronary Artery Disease] : coronary artery disease [FreeTextEntry3] : Dr. Keller [FreeTextEntry1] : 89-year-old female comes in for follow-up consultation to review further management of her paroxysmal atrial fibrillation and labile hypertension. She was again admitted after last time I saw her because of significant anemia and near syncopal event.  She is being followed with hematologist.  There were no significant cardiac problems. She has had intermittent episodes of atrial fibrillation.  She is and she has had lower and high blood pressure requiring as needed use of amlodipine.  She has continued epigastric discomfort. she has been eating small meals. As needed amlodipine for blood pressure greater than 170/100 or symptoms. Improved fatigue and tiredness. She is anxious about it. She has had possible anginal symptoms requiring use of nitroglycerin. Remains fairly active limitation is arthritis.  Walks with a cane. There is no hemoptysis, hematemesis, melena, or black, tarry stool. She has no visual disturbances or focal weakness. She does not have any increased caffeine or alcohol intake. Denies any recent hospital admission

## 2023-08-07 NOTE — PHYSICAL EXAM
[General Appearance - Well Developed] : well developed [Normal Appearance] : normal appearance [Well Groomed] : well groomed [General Appearance - Well Nourished] : well nourished [No Deformities] : no deformities [General Appearance - In No Acute Distress] : no acute distress [] : no respiratory distress [Respiration, Rhythm And Depth] : normal respiratory rhythm and effort [Exaggerated Use Of Accessory Muscles For Inspiration] : no accessory muscle use [Auscultation Breath Sounds / Voice Sounds] : lungs were clear to auscultation bilaterally [Heart Rate And Rhythm] : heart rate and rhythm were normal [Heart Sounds] : normal S1 and S2 [Arterial Pulses Normal] : the arterial pulses were normal [Veins - Varicosity Changes] : no varicosital changes were noted in the lower extremities [Nail Clubbing] : no clubbing of the fingernails [Cyanosis, Localized] : no localized cyanosis [Oriented To Time, Place, And Person] : oriented to person, place, and time [Mood] : the mood was normal [Affect] : the affect was normal [No Anxiety] : not feeling anxious [FreeTextEntry1] : Walks with a cane

## 2023-08-07 NOTE — DISCUSSION/SUMMARY
[FreeTextEntry1] : Assessment and Suggestions: 89-year-old female with above medical history and active medical problems as noted below -Hiatus hernia.  On the morning nausea.  Constipation.  Most likely her chest pain syndrome is more related to gastroesophageal reflux disease rather than unstable CAD.  As needed Zofran use.  Small meals.  Recline position for sleeping.  Constipation management.  Consider GI evaluation.  She would be at a higher risk for surgical intervention which was reviewed with her at her request. If needed consider Reglan as a trial in place of Zofran. -Paroxysmal atrial fibrillation.    Not on anticoagulation because of high risk for bleeding complication, history of GI bleed in the past, high risk for falls.  Understands risk benefits alternatives at present.  If high atrial fibrillation burden will discuss regarding anticoagulation versus no anticoagulation versus watchman's device.At present considering inability to use even short-term anticoagulation she would not be a good candidate for watchman's device. - chest pain: As needed nitroglycerin. Coronary artery disease.  CABG.  Decrease aspirin to 81 mg.  Significant GI symptoms off statin therapy. -HTN.  Labile.  As needed use of amlodipine considering intermittent low blood pressure with symptoms of dizziness.  Avoid falls. -hyperlipidemia .  Due to continued significant fatigue tiredness gait abnormality musclelike it was decided to stop statin therapy.  Understands risk benefits and alternatives. -Non-rheumatic mitral and aortic insufficiency. Concentric LVH . mild pulmonary Hypertension. Clinically, no signs of left or right heart failure.  -PVD.  Carotid atherosclerosis.  Nonobstructive.  Continue aspirin and statin therapy.  No neurological event.  -Gait and balance abnormality.  Safety precaution.  Physical therapy.  And use of cane. -Anxiety and stress.  Sertraline restarted at 25 mg which helped her in the past   Counseling regarding low saturated fat, low carbohydrate intake was reviewed. Active lifestyle and regular. Exercise is along with weight maintenance is advised. I have reviewed above at length. I answered all the questions. Patient verbalized understanding Thank you very much for allowing me to participate in your patient's care. Please feel free to call me for any questions.  Sincerely,  Harpal Locke MD,[facility],[place]

## 2023-08-07 NOTE — ASSESSMENT
[FreeTextEntry1] : Past tests for reference. cardiac catheterization 1-19-11 distal left main 50%, mid LAD 70% (LIMa-LAD normal), left circ luminal irregularities, mid RCA 90%, 75%, distal RCA 90% (SVG-RCA luminal irregularities), SVG-OM 10%  echo 12/7/15; 60% lse calcified av/mv, mild mr.tr pasp 34 mm of hg.  EKG ordered and interpreted by me on October 12, 2017 indication chest pain. Known coronary artery disease. Interpretation sinus bradycardia. Rightward axis septal infarct. Nonspecific ST-T changes.  echo 12-1-16 ef 60% with normal ventricular function, mild diatolic dysfunction, mild RI/MR, aneurysmal inter-atrial septum, c/w borderline PHTN pulmonary  pressures 38mmhg, moderate LAE   carotid ultrasound 8-25-16 disease with BERNARDA/CCA ratio is pete due to low CCA velocities. Decrease in LICA/CCA ratio noted.   nuclear stress test 10-13-17 lexiscan equivocal for ischemia by ekg with no evidence of infarction or inducible ischemia.  labs 10-11-17 wbc 8.6, h/h 12.2/36.8, plat 208, Na+ 141, K 4.7, bun/creat 21/1.14  EKG ordered and interpreted by me on March 20 2018. The patient has been interpretation coronary artery disease. In addition to a normal sinus rhythm. Baseline. The progression. No acute ST-T changes.  Reviewed on November 15, 2018. Labs from October 24, 2018 were reviewed. Echocardiogram November 12, 2018 LV ejection fraction 60%. Mild aortic stenosis. Mild mitral regurgitation. Carotid Doppler study November 12, 2018. Nonobstructive disease  EKG November 15, 2018. Indication coronary artery disease. Interpretation normal sinus rhythm. 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 No significant new changes.   Reviewed on January 9, 2020. Echocardiogram January 9, 2001 EF around 60% with mild mitral regurgitation. Mild aortic regurgitation. LVH with pulmonary pressures of 45 mm mercury. A slight increase in pulmonary pressures noted before. Labs from January 7, 2020 were reviewed. Hemoglobin 11.0, platelet count 238. CMP, stable on January 7, 2020. November 2019. Labs had shown stable. Lipid panel.  Reviewed July 28, 2020 Labs from January 7, 2020 and May 28, 2020 were reviewed.  EKG normal sinus rhythm low voltage poor R wave progression Labs from April 2021 were reviewed excellent lipid panel otherwise stable labs elevated white count  Reviewed on November 1, 2021. Labs from July 16, 2021 were reviewed.  reviewed  2/9/22 Labs from October 20, 2021 were reviewed  Reviewed on Jasmina 15, 2022. EKG as noted above Labs from October 2021 were reviewed again. Echocardiogram May 24, 2022 EF 60% mild mitral and aortic regurgitation.  Carotid Doppler study May 24, 2022 mild nonobstructive disease  Reviewed on September 12, 2022. Hospital data from September 1, 2022 reviewed.  Which includes EKG labs cardiology consult admit note discharge summary.  Reviewed on March 15, 2023. Blood pressure readings reviewed Labs from February 17, 2023 and hospital data were reviewed.  March 29, 2023 Hospital data and EKG from today reviewed

## 2023-08-07 NOTE — HISTORY OF PRESENT ILLNESS
[FreeTextEntry1] : \par  PMH as noted below:\par  Coronary artery disease status post CABG, which includes LIMA to LAD. 4/2010 [facility]/[place].  Nuclear stress test from 10/17 with no significant perfusion defect. \par  \par  Essential hypertension:  Well controlled. \par  \par  History of having gastric ulcer with a GI bleed in December.  s/p transfusions. GI evaluation for PUD, followed with Dr. Ghosh in the past.\par  \par  Dyslipidemia. Remaining on statin.\par  \par  Moderate mitral regurgitation. Remaining stable.\par  \par  Mild pulmonary hypertension.  No CHF\par  \par  History of depression. improved.\par  \par  History of syncopal episode.  Implantable loop recorder. No significant findings.\par  \par  Bilateral moderate carotid stenosis.  Per past note; No significant change when evaluated in April 2015.  Remaining without neurological events \par

## 2023-08-07 NOTE — REVIEW OF SYSTEMS
[Feeling Fatigued] : feeling fatigued [Dyspnea on exertion] : dyspnea during exertion [Chest Discomfort] : chest discomfort [Palpitations] : palpitations [Heartburn] : heartburn [Joint Pain] : joint pain [Joint Swelling] : joint swelling [Joint Stiffness] : joint stiffness [Negative] : Heme/Lymph [SOB] : no shortness of breath [Lower Ext Edema] : no extremity edema [Leg Claudication] : no intermittent leg claudication [Orthopnea] : no orthopnea [PND] : no PND [Syncope] : no syncope [FreeTextEntry5] : As per HPI [FreeTextEntry8] : Increased urinary frequency without any dysuria, pain, hematuria, fever [de-identified] : Balance abnormality

## 2023-08-07 NOTE — CARDIOLOGY SUMMARY
[de-identified] : nsr low voltage complexes poor R wave progression.  June 7, 2020\par  1/14/2022.  Normal sinus rhythm.  Poor R wave progression.\par  March 29, 2023 normal sinus rhythm poor R wave progression normal intervals [de-identified] : Available information on event recorder reviewed [de-identified] : January 9, 2020.  EF 60 mild mitral regurgitation RVSP 45\par  May 24, 2022..  LVEF 60% mild mitral and aortic regurgitation [de-identified] : Bilateral carotid Doppler study.  May 24, 2022 mild nonobstructive disease

## 2023-08-23 ENCOUNTER — OFFICE (OUTPATIENT)
Dept: URBAN - METROPOLITAN AREA CLINIC 8 | Facility: CLINIC | Age: 88
Setting detail: OPHTHALMOLOGY
End: 2023-08-23
Payer: MEDICARE

## 2023-08-23 ENCOUNTER — OFFICE (OUTPATIENT)
Dept: URBAN - METROPOLITAN AREA CLINIC 8 | Facility: CLINIC | Age: 88
Setting detail: OPHTHALMOLOGY
End: 2023-08-23

## 2023-08-23 DIAGNOSIS — H90.3: ICD-10-CM

## 2023-08-23 DIAGNOSIS — H35.363: ICD-10-CM

## 2023-08-23 DIAGNOSIS — Z96.1: ICD-10-CM

## 2023-08-23 DIAGNOSIS — H16.223: ICD-10-CM

## 2023-08-23 DIAGNOSIS — H40.1131: ICD-10-CM

## 2023-08-23 DIAGNOSIS — H26.493: ICD-10-CM

## 2023-08-23 PROCEDURE — 99213 OFFICE O/P EST LOW 20 MIN: CPT | Performed by: OPHTHALMOLOGY

## 2023-08-23 PROCEDURE — 92593 HEARING AID CHECK; BINAURAL: CPT | Performed by: AUDIOLOGIST

## 2023-08-23 PROCEDURE — 92134 CPTRZ OPH DX IMG PST SGM RTA: CPT | Performed by: OPHTHALMOLOGY

## 2023-08-23 ASSESSMENT — REFRACTION_MANIFEST
OS_VA1: 20/30+2
OS_CYLINDER: -1.00
OS_VA1: 20/30+2
OD_ADD: +2.75
OS_VA2: 20/20(J1+)
OS_SPHERE: -0.50
OD_SPHERE: +0.75
OS_ADD: +2.75
OD_AXIS: 080
OU_VA: 20/20-2
OD_CYLINDER: -0.75
OU_VA: 20/20-2
OD_VA1: 20/20-2
OD_VA2: 20/25(J1)
OS_SPHERE: -0.50
OS_VA2: 20/20(J1+)
OD_VA1: 20/20-2
OS_ADD: +2.75
OS_CYLINDER: -0.75
OD_VA2: 20/25(J1)
OS_AXIS: 080
OD_SPHERE: +0.75
OD_AXIS: 080
OD_CYLINDER: -0.75
OD_ADD: +2.75
OS_AXIS: 080

## 2023-08-23 ASSESSMENT — SPHEQUIV_DERIVED
OD_SPHEQUIV: 0.375
OD_SPHEQUIV: 0.375
OS_SPHEQUIV: -0.875
OD_SPHEQUIV: 0.375
OS_SPHEQUIV: -1
OS_SPHEQUIV: -1.5

## 2023-08-23 ASSESSMENT — REFRACTION_AUTOREFRACTION
OD_AXIS: 082
OS_CYLINDER: -1.00
OD_SPHERE: +0.75
OS_SPHERE: -1.00
OD_CYLINDER: -0.75
OS_AXIS: 081

## 2023-08-23 ASSESSMENT — KERATOMETRY
OD_K2POWER_DIOPTERS: 44.50
OD_K1POWER_DIOPTERS: 42.25
OS_K1POWER_DIOPTERS: 42.25
OD_AXISANGLE_DEGREES: 178
OS_AXISANGLE_DEGREES: 008
METHOD_AUTO_MANUAL: AUTO
OS_K2POWER_DIOPTERS: 44.50

## 2023-08-23 ASSESSMENT — AXIALLENGTH_DERIVED
OS_AL: 23.9852
OD_AL: 23.4921
OS_AL: 24.2395
OS_AL: 24.0356

## 2023-08-23 ASSESSMENT — CONFRONTATIONAL VISUAL FIELD TEST (CVF)
OD_FINDINGS: FULL
OS_FINDINGS: FULL

## 2023-08-23 ASSESSMENT — PACHYMETRY
OS_CT_CORRECTION: 3
OS_CT_UM: 500
OD_CT_CORRECTION: 4
OD_CT_UM: 490

## 2023-08-23 ASSESSMENT — REFRACTION_CURRENTRX
OD_CYLINDER: SPHERE
OS_VPRISM_DIRECTION: SV
OS_AXIS: 065
OD_VPRISM_DIRECTION: SV
OS_SPHERE: +1.00
OD_SPHERE: +2.50
OS_OVR_VA: 20/
OD_OVR_VA: 20/
OS_CYLINDER: -0.50

## 2023-08-23 ASSESSMENT — VISUAL ACUITY
OS_BCVA: 20/30-2
OD_BCVA: 20/100

## 2023-08-23 ASSESSMENT — TONOMETRY
OD_IOP_MMHG: 11
OS_IOP_MMHG: 10

## 2023-10-23 ENCOUNTER — OFFICE (OUTPATIENT)
Dept: URBAN - METROPOLITAN AREA CLINIC 8 | Facility: CLINIC | Age: 88
Setting detail: OPHTHALMOLOGY
End: 2023-10-23
Payer: MEDICARE

## 2023-10-23 DIAGNOSIS — H52.7: ICD-10-CM

## 2023-10-23 PROCEDURE — RX/CHECK RX/CHECK: Performed by: OPHTHALMOLOGY

## 2023-10-23 ASSESSMENT — REFRACTION_CURRENTRX
OS_SPHERE: -0.50
OS_VPRISM_DIRECTION: SV
OS_VPRISM_DIRECTION: SV
OS_AXIS: 082
OD_AXIS: 084
OS_SPHERE: +2.25
OD_CYLINDER: -0.75
OD_CYLINDER: -0.75
OD_OVR_VA: 20/
OS_AXIS: 079
OS_CYLINDER: -0.75
OS_OVR_VA: 20/
OD_AXIS: 081
OD_VPRISM_DIRECTION: SV
OD_SPHERE: +3.50
OS_CYLINDER: -0.75
OD_SPHERE: +0.75
OD_VPRISM_DIRECTION: SV
OD_OVR_VA: 20/
OS_OVR_VA: 20/

## 2023-10-23 ASSESSMENT — REFRACTION_MANIFEST
OD_VA2: 20/25(J1)
OD_SPHERE: +0.50
OS_ADD: +2.75
OS_ADD: +2.75
OD_ADD: +2.75
OS_VA2: 20/20(J1+)
OS_VA1: 20/30+2
OU_VA: 20/20-2
OU_VA: 20/20
OS_AXIS: 085
OD_VA1: 20/20
OD_VA2: 20/25(J1)
OS_VA2: 20/20(J1+)
OD_AXIS: 070
OD_CYLINDER: -0.75
OS_AXIS: 085
OS_CYLINDER: -0.75
OD_ADD: +2.75
OD_VA1: 20/20-2
OS_VA1: 20/20-2
OS_SPHERE: -1.00
OD_SPHERE: +0.50
OS_CYLINDER: -0.75
OD_CYLINDER: -0.50
OS_SPHERE: -0.75
OD_AXIS: 065

## 2023-10-23 ASSESSMENT — KERATOMETRY
OS_AXISANGLE_DEGREES: 010
OS_K1POWER_DIOPTERS: 42.50
METHOD_AUTO_MANUAL: AUTO
OS_K2POWER_DIOPTERS: 44.50
OD_K1POWER_DIOPTERS: 42.50
OD_AXISANGLE_DEGREES: 179
OD_K2POWER_DIOPTERS: 44.50

## 2023-10-23 ASSESSMENT — SPHEQUIV_DERIVED
OS_SPHEQUIV: -1.125
OS_SPHEQUIV: -1.5
OS_SPHEQUIV: -1.375
OD_SPHEQUIV: 0.25
OD_SPHEQUIV: 0.25
OD_SPHEQUIV: 0.125

## 2023-10-23 ASSESSMENT — CONFRONTATIONAL VISUAL FIELD TEST (CVF)
OD_FINDINGS: FULL
OS_FINDINGS: FULL

## 2023-10-23 ASSESSMENT — TONOMETRY
OD_IOP_MMHG: 10
OS_IOP_MMHG: 10

## 2023-10-23 ASSESSMENT — AXIALLENGTH_DERIVED
OS_AL: 24.0385
OS_AL: 24.1401
OD_AL: 23.4949
OD_AL: 23.5433
OS_AL: 24.1912
OD_AL: 23.4949

## 2023-10-23 ASSESSMENT — REFRACTION_AUTOREFRACTION
OD_AXIS: 067
OS_SPHERE: -1.00
OD_SPHERE: +0.50
OD_CYLINDER: -0.50
OS_CYLINDER: -1.00
OS_AXIS: 086

## 2023-10-23 ASSESSMENT — VISUAL ACUITY
OD_BCVA: 20/60
OS_BCVA: 20/25-

## 2023-10-23 ASSESSMENT — PACHYMETRY
OS_CT_UM: 500
OD_CT_CORRECTION: 4
OD_CT_UM: 490
OS_CT_CORRECTION: 3

## 2023-11-14 ENCOUNTER — OFFICE (OUTPATIENT)
Dept: URBAN - METROPOLITAN AREA CLINIC 8 | Facility: CLINIC | Age: 88
Setting detail: OPHTHALMOLOGY
End: 2023-11-14
Payer: MEDICARE

## 2023-11-14 DIAGNOSIS — H00.11: ICD-10-CM

## 2023-11-14 PROCEDURE — 99213 OFFICE O/P EST LOW 20 MIN: CPT

## 2023-11-14 ASSESSMENT — CONFRONTATIONAL VISUAL FIELD TEST (CVF)
OS_FINDINGS: FULL
OD_FINDINGS: FULL

## 2023-11-17 ASSESSMENT — REFRACTION_CURRENTRX
OD_OVR_VA: 20/
OS_AXIS: 082
OD_AXIS: 084
OD_SPHERE: +0.75
OD_VPRISM_DIRECTION: SV
OS_OVR_VA: 20/
OD_CYLINDER: -0.75
OD_SPHERE: +3.50
OS_CYLINDER: -0.75
OS_CYLINDER: -0.75
OS_VPRISM_DIRECTION: SV
OS_SPHERE: +2.25
OS_OVR_VA: 20/
OS_AXIS: 079
OD_VPRISM_DIRECTION: SV
OD_OVR_VA: 20/
OS_SPHERE: -0.50
OS_VPRISM_DIRECTION: SV
OD_AXIS: 081
OD_CYLINDER: -0.75

## 2023-11-17 ASSESSMENT — REFRACTION_AUTOREFRACTION
OD_CYLINDER: -0.50
OS_AXIS: 086
OD_AXIS: 067
OS_SPHERE: -1.00
OS_CYLINDER: -1.00
OD_SPHERE: +0.50

## 2023-11-17 ASSESSMENT — REFRACTION_MANIFEST
OD_VA1: 20/20-2
OS_VA1: 20/20-2
OS_VA2: 20/20(J1+)
OD_SPHERE: +0.50
OS_AXIS: 085
OS_CYLINDER: -0.75
OD_SPHERE: +0.50
OS_ADD: +2.75
OU_VA: 20/20-2
OD_VA2: 20/25(J1)
OD_AXIS: 070
OD_VA2: 20/25(J1)
OD_ADD: +2.75
OD_ADD: +2.75
OS_CYLINDER: -0.75
OD_AXIS: 065
OS_SPHERE: -1.00
OS_AXIS: 085
OS_ADD: +2.75
OD_CYLINDER: -0.75
OS_VA1: 20/30+2
OU_VA: 20/20
OD_VA1: 20/20
OS_SPHERE: -0.75
OS_VA2: 20/20(J1+)
OD_CYLINDER: -0.50

## 2023-11-17 ASSESSMENT — SPHEQUIV_DERIVED
OD_SPHEQUIV: 0.125
OD_SPHEQUIV: 0.25
OS_SPHEQUIV: -1.5
OS_SPHEQUIV: -1.125
OD_SPHEQUIV: 0.25
OS_SPHEQUIV: -1.375

## 2023-11-28 ENCOUNTER — OFFICE (OUTPATIENT)
Dept: URBAN - METROPOLITAN AREA CLINIC 8 | Facility: CLINIC | Age: 88
Setting detail: OPHTHALMOLOGY
End: 2023-11-28
Payer: MEDICARE

## 2023-11-28 DIAGNOSIS — H16.223: ICD-10-CM

## 2023-11-28 DIAGNOSIS — H35.363: ICD-10-CM

## 2023-11-28 DIAGNOSIS — H52.7: ICD-10-CM

## 2023-11-28 DIAGNOSIS — H40.1131: ICD-10-CM

## 2023-11-28 PROBLEM — H00.11 CHALAZION; RIGHT UPPER LID: Status: RESOLVED | Noted: 2023-11-14 | Resolved: 2023-11-28

## 2023-11-28 PROCEDURE — 92015 DETERMINE REFRACTIVE STATE: CPT

## 2023-11-28 PROCEDURE — 99213 OFFICE O/P EST LOW 20 MIN: CPT

## 2023-11-28 ASSESSMENT — SUPERFICIAL PUNCTATE KERATITIS (SPK)
OD_SPK: T
OS_SPK: T

## 2023-11-28 ASSESSMENT — CONFRONTATIONAL VISUAL FIELD TEST (CVF)
OS_FINDINGS: FULL
OD_FINDINGS: FULL

## 2023-11-28 ASSESSMENT — TEAR BREAK UP TIME (TBUT)
OD_TBUT: 4 SECONDS
OS_TBUT: 4 SECONDS

## 2023-12-01 ASSESSMENT — REFRACTION_MANIFEST
OD_SPHERE: +0.75
OS_CYLINDER: -0.75
OD_VA1: 20/20-
OD_AXIS: 065
OS_AXIS: 085
OS_CYLINDER: -0.75
OS_ADD: +2.75
OD_CYLINDER: -0.65
OD_VA2: 20/25(J1)
OD_SPHERE: +1.00
OS_VA1: 20/30
OD_AXIS: 065
OS_VA2: 20/20(J1+)
OD_ADD: +2.75
OU_VA: 20/20
OS_ADD: +2.75
OD_VA1: 20/25-2
OS_VA1: 20/20
OD_VA2: 20/25(J1)
OS_SPHERE: -1.00
OD_CYLINDER: -0.75
OS_SPHERE: -1.00
OS_VA2: 20/20(J1+)
OU_VA: 20/20-2
OD_ADD: +2.75
OS_AXIS: 085

## 2023-12-01 ASSESSMENT — REFRACTION_CURRENTRX
OD_VPRISM_DIRECTION: SV
OS_OVR_VA: 20/
OD_CYLINDER: SPH
OS_SPHERE: -0.50
OS_VPRISM_DIRECTION: SV
OS_AXIS: 082
OD_CYLINDER: -0.75
OD_SPHERE: +0.75
OS_CYLINDER: -0.75
OS_OVR_VA: 20/
OS_CYLINDER: -0.50
OD_VPRISM_DIRECTION: SV
OS_SPHERE: +1.50
OD_AXIS: 084
OS_AXIS: 094
OD_SPHERE: +2.50
OS_VPRISM_DIRECTION: SV
OD_OVR_VA: 20/
OD_OVR_VA: 20/

## 2023-12-01 ASSESSMENT — AXIALLENGTH_DERIVED
OD_AL: 23.3509
OD_AL: 23.4949
OS_AL: 24.1912
OS_AL: 24.1401
OS_AL: 24.1401
OD_AL: 23.43

## 2023-12-01 ASSESSMENT — REFRACTION_AUTOREFRACTION
OD_CYLINDER: -0.50
OD_AXIS: 067
OS_CYLINDER: -1.00
OS_AXIS: 086
OD_SPHERE: +0.50
OS_SPHERE: -1.00

## 2023-12-01 ASSESSMENT — SPHEQUIV_DERIVED
OD_SPHEQUIV: 0.425
OS_SPHEQUIV: -1.375
OS_SPHEQUIV: -1.375
OD_SPHEQUIV: 0.625
OD_SPHEQUIV: 0.25
OS_SPHEQUIV: -1.5

## 2023-12-01 ASSESSMENT — KERATOMETRY
OS_K1POWER_DIOPTERS: 42.50
OD_K2POWER_DIOPTERS: 44.50
OD_K1POWER_DIOPTERS: 42.50
METHOD_AUTO_MANUAL: AUTO
OS_K2POWER_DIOPTERS: 44.50
OD_AXISANGLE_DEGREES: 179
OS_AXISANGLE_DEGREES: 010

## 2023-12-11 ENCOUNTER — OFFICE (OUTPATIENT)
Dept: URBAN - METROPOLITAN AREA CLINIC 8 | Facility: CLINIC | Age: 88
Setting detail: OPHTHALMOLOGY
End: 2023-12-11

## 2023-12-11 ENCOUNTER — OFFICE (OUTPATIENT)
Dept: URBAN - METROPOLITAN AREA CLINIC 8 | Facility: CLINIC | Age: 88
Setting detail: OPHTHALMOLOGY
End: 2023-12-11
Payer: MEDICARE

## 2023-12-11 DIAGNOSIS — H40.1131: ICD-10-CM

## 2023-12-11 DIAGNOSIS — Z96.1: ICD-10-CM

## 2023-12-11 DIAGNOSIS — H35.363: ICD-10-CM

## 2023-12-11 DIAGNOSIS — H16.223: ICD-10-CM

## 2023-12-11 DIAGNOSIS — H90.3: ICD-10-CM

## 2023-12-11 DIAGNOSIS — H26.493: ICD-10-CM

## 2023-12-11 PROCEDURE — 99214 OFFICE O/P EST MOD 30 MIN: CPT | Performed by: OPHTHALMOLOGY

## 2023-12-11 PROCEDURE — 92593 HEARING AID CHECK; BINAURAL: CPT | Performed by: AUDIOLOGIST

## 2023-12-11 ASSESSMENT — REFRACTION_CURRENTRX
OD_VPRISM_DIRECTION: SV
OD_CYLINDER: SPH
OS_SPHERE: +1.50
OS_VPRISM_DIRECTION: SV
OD_OVR_VA: 20/
OD_OVR_VA: 20/
OD_SPHERE: +2.50
OS_AXIS: 094
OD_VPRISM_DIRECTION: SV
OD_AXIS: 084
OS_CYLINDER: -0.75
OS_OVR_VA: 20/
OD_SPHERE: +0.75
OS_VPRISM_DIRECTION: SV
OD_CYLINDER: -0.75
OS_SPHERE: -0.50
OS_AXIS: 082
OS_CYLINDER: -0.50
OS_OVR_VA: 20/

## 2023-12-11 ASSESSMENT — REFRACTION_MANIFEST
OD_VA2: 20/25(J1)
OD_VA1: 20/25-2
OD_AXIS: 065
OS_AXIS: 085
OD_CYLINDER: -0.75
OD_ADD: +2.75
OS_VA1: 20/20
OS_ADD: +2.75
OD_VA2: 20/25(J1)
OS_VA2: 20/20(J1+)
OS_SPHERE: -1.00
OU_VA: 20/20-2
OD_VA1: 20/20-
OS_SPHERE: -1.00
OS_ADD: +2.75
OD_CYLINDER: -0.65
OD_SPHERE: +0.75
OD_SPHERE: +1.00
OU_VA: 20/20
OD_AXIS: 065
OS_VA2: 20/20(J1+)
OD_ADD: +2.75
OS_CYLINDER: -0.75
OS_AXIS: 085
OS_VA1: 20/30
OS_CYLINDER: -0.75

## 2023-12-11 ASSESSMENT — REFRACTION_AUTOREFRACTION
OS_SPHERE: -1.00
OD_AXIS: 067
OS_CYLINDER: -1.00
OD_CYLINDER: -0.50
OS_AXIS: 086
OD_SPHERE: +0.50

## 2023-12-11 ASSESSMENT — CONFRONTATIONAL VISUAL FIELD TEST (CVF)
OS_FINDINGS: FULL
OD_FINDINGS: FULL

## 2023-12-11 ASSESSMENT — SPHEQUIV_DERIVED
OS_SPHEQUIV: -1.375
OS_SPHEQUIV: -1.375
OD_SPHEQUIV: 0.425
OD_SPHEQUIV: 0.25
OD_SPHEQUIV: 0.625
OS_SPHEQUIV: -1.5

## 2023-12-14 ENCOUNTER — APPOINTMENT (OUTPATIENT)
Dept: CARDIOLOGY | Facility: CLINIC | Age: 88
End: 2023-12-14
Payer: MEDICARE

## 2023-12-14 VITALS
HEART RATE: 59 BPM | SYSTOLIC BLOOD PRESSURE: 134 MMHG | OXYGEN SATURATION: 96 % | WEIGHT: 142 LBS | HEIGHT: 65 IN | BODY MASS INDEX: 23.66 KG/M2 | DIASTOLIC BLOOD PRESSURE: 60 MMHG

## 2023-12-14 PROCEDURE — 99214 OFFICE O/P EST MOD 30 MIN: CPT

## 2023-12-14 RX ORDER — ISOSORBIDE MONONITRATE 30 MG/1
30 TABLET, EXTENDED RELEASE ORAL
Qty: 90 | Refills: 1 | Status: DISCONTINUED | COMMUNITY
End: 2023-12-14

## 2023-12-14 RX ORDER — VIT C/E/ZN/COPPR/LUTEIN/ZEAXAN 250MG-90MG
CAPSULE ORAL
Refills: 0 | Status: ACTIVE | COMMUNITY

## 2023-12-14 RX ORDER — ONDANSETRON 4 MG/1
4 TABLET, ORALLY DISINTEGRATING ORAL
Qty: 30 | Refills: 0 | Status: DISCONTINUED | COMMUNITY
Start: 2023-03-15 | End: 2023-12-14

## 2023-12-14 RX ORDER — FAMOTIDINE 40 MG/1
40 TABLET, FILM COATED ORAL TWICE DAILY
Refills: 0 | Status: DISCONTINUED | COMMUNITY
End: 2023-12-14

## 2023-12-14 RX ORDER — ONDANSETRON 4 MG/1
4 TABLET ORAL DAILY
Qty: 30 | Refills: 1 | Status: DISCONTINUED | COMMUNITY
Start: 2023-03-15 | End: 2023-12-14

## 2023-12-14 RX ORDER — AMLODIPINE BESYLATE 5 MG/1
5 TABLET ORAL DAILY
Qty: 90 | Refills: 1 | Status: DISCONTINUED | COMMUNITY
End: 2023-12-14

## 2023-12-14 RX ORDER — FAMOTIDINE 20 MG/1
20 TABLET, FILM COATED ORAL
Refills: 0 | Status: ACTIVE | COMMUNITY

## 2023-12-14 NOTE — CARDIOLOGY SUMMARY
[de-identified] : nsr low voltage complexes poor R wave progression.  June 7, 2020\par  1/14/2022.  Normal sinus rhythm.  Poor R wave progression.\par  March 29, 2023 normal sinus rhythm poor R wave progression normal intervals [de-identified] : Available information on event recorder reviewed [de-identified] : January 9, 2020.  EF 60 mild mitral regurgitation RVSP 45\par  May 24, 2022..  LVEF 60% mild mitral and aortic regurgitation [de-identified] : Bilateral carotid Doppler study.  May 24, 2022 mild nonobstructive disease

## 2023-12-14 NOTE — PHYSICAL EXAM
[Normal] : clear lung fields, good air entry, no respiratory distress [Abnormal Gait] : abnormal gait [No Cyanosis] : no cyanosis [No Clubbing] : no clubbing [Normal Radial B/L] : normal radial B/L [Edema ___] : edema [unfilled] [Normal Speech] : normal speech [Alert and Oriented] : alert and oriented [de-identified] :  regular S1-S2.  Systolic murmur.  No gallop or rub.

## 2023-12-14 NOTE — HISTORY OF PRESENT ILLNESS
Physician Documentation                                                                           

 [facility]                                                                 

Name: Tone Lara                                                                                  

Age: 16 months                                                                                    

Sex: Male                                                                                         

: 2018                                                                                   

MRN: [id number]                                                                                   

Arrival Date: 2019                                                                          

Time: 18:37                                                                                       

Account#: [id number]                                                                            

Bed 13                                                                                            

Private MD:                                                                                       

ED Physician Umberto Bailey                                                                      

HPI:                                                                                              

                                                                                             

19:10 This 16 months old  Male presents to ER via [facility] with complaints of Fever.   cp  

19:10 The parent or guardian reports fever in the child, with an emergency department         cp  

      temperature of 102 degrees Fahrenheit. Onset: The symptoms/episode began/occurred 3         

      day(s) ago.                                                                                 

                                                                                                  

Historical:                                                                                       

- Allergies:                                                                                      

19:00 No Known Allergies;                                                                     iw  

- Home Meds:                                                                                      

19:00 None [Active];                                                                          iw  

- PMHx:                                                                                           

19:00 None;                                                                                   iw  

- PSHx:                                                                                           

19:00 None;                                                                                   iw  

                                                                                                  

- Immunization history:: Childhood immunizations are up to date.                                  

- Ebola Screening: : Patient negative for fever greater than or equal to 101.5 degrees            

  Fahrenheit, and additional compatible Ebola Virus Disease symptoms Patient denies               

  exposure to infectious person Patient denies travel to an Ebola-affected area in the            

  21 days before illness onset No symptoms or risks identified at this time.                      

                                                                                                  

                                                                                                  

ROS:                                                                                              

19:20 Constitutional: Positive for fever, Negative for fussiness, poor PO intake.             cp  

19:20 Eyes: Negative for injury, pain, redness, and discharge.                                cp  

19:20 ENT: Negative for drainage from ear(s), difficulty swallowing, difficulty handling          

      secretions.                                                                                 

19:20 Respiratory: Negative for cough, wheezing.                                                  

19:20 Abdomen/GI: Negative for vomiting, diarrhea, constipation.                                  

19:20 Skin: Negative for rash.                                                                    

19:20 All other systems are negative.                                                             

                                                                                                  

Exam:                                                                                             

19:25 Constitutional: The patient appears in no acute distress, alert, awake, non-toxic, well cp  

      developed, well nourished.                                                                  

19:25 Head/Face:  Normocephalic, atraumatic.                                                  cp  

19:25 Eyes: Periorbital structures: appear normal, Conjunctiva: normal, no exudate, no            

      injection, Lids and lashes: appear normal, bilaterally.                                     

19:25 ENT: External ear(s): are unremarkable, Ear canal(s): are normal, clear, TM's:              

      erythema, on the right, Nose: is normal, Mouth: Lips: moist, Oral mucosa: moist,            

      Posterior pharynx: Tonsils: bilaterally enlarged, with erythema, erythema, that is          

      moderate.                                                                                   

19:25 Neck: ROM/movement: is normal, is supple, no meningismus, no nuchal rigidity.               

19:25 Chest/axilla: Inspection: normal, Palpation: is normal, no crepitus, no tenderness.         

19:25 Cardiovascular: Rate: tachycardic, Rhythm: regular.                                         

19:25 Respiratory: the patient does not display signs of respiratory distress,  Respirations:     

      normal, no use of accessory muscles, no retractions, no splinting, no tachypnea,            

      labored breathing, is not present, Breath sounds: are clear throughout, no decreased        

      breath sounds, no stridor, no wheezing.                                                     

19:25 Abdomen/GI: Inspection: abdomen appears normal, Palpation: abdomen is soft and              

      non-tender, in all quadrants.                                                               

19:25 Skin: no rash present.                                                                      

                                                                                                  

Vital Signs:                                                                                      

18:56 Pulse 172; Resp 30 S; Temp 102.0(A); Pulse Ox 100% on R/A; Weight 14.6 kg (M);          iw  

20:30 Pulse 128; Resp 27; Temp 100.2; Pulse Ox 100% ;                                         rr5 

                                                                                                  

MDM:                                                                                              

19:16 Patient medically screened.                                                               

20:37 Data reviewed: vital signs, nurses notes, lab test result(s).                             

20:37 Counseling: I had a detailed discussion with the patient and/or guardian regarding: the cp  

      historical points, exam findings, and any diagnostic results supporting the                 

      discharge/admit diagnosis, lab results, to return to the emergency department if            

      symptoms worsen or persist or if there are any questions or concerns that arise at          

      home. Response to treatment: the patient's symptoms have markedly improved after            

      treatment, tolerates PO, fluids.                                                            

                                                                                                  

                                                                                             

19:32 Order name: Strep; Complete Time: 20:03                                                 cp  

                                                                                             

20:03 Interpretation: Reviewed.                                                                 

                                                                                             

19:32 Order name: Influenza Screen (a \T\ B)                                                    cp

                                                                                             

19:32 Order name: PO challenge: pedialyte; Complete Time: 20:27                               cp  

                                                                                             

20:03 Order name: Throat Culture                                                              EDMS

                                                                                             

20:20 Order name: Vital Signs: to include temp; Complete Time: 20:32                          cp  

                                                                                                  

Administered Medications:                                                                         

19:04 Drug: Tylenol 15 mg/kg Route: PO;                                                       sg  

20:05 Follow up: Response: No adverse reaction                                                rr5 

                                                                                                  

                                                                                                  

Disposition:                                                                                      

19 20:38 Discharged to Home. Impression: Otitis media, unspecified, bilateral.              

- Condition is Stable.                                                                            

- Discharge Instructions: Ibuprofen Dosage Chart, Pediatric, Acetaminophen Dosage                 

  Chart, Pediatric, Otitis Media, Pediatric.                                                      

- Prescriptions for Amoxicillin 400 mg/5 mL Oral Suspension for Reconstitution - take             

  7.9 milliliter by ORAL route every 12 hours for 10 days Max dose = 1750mg/day; 160              

  milliliter.                                                                                     

- Medication Reconciliation Form, Thank You Letter, Antibiotic Education, Prescription            

  Opioid Use form.                                                                                

- Follow up: Private Physician; When: 2 - 3 days; Reason: Recheck today's complaints.             

- Problem is new.                                                                                 

- Symptoms have improved.                                                                         

                                                                                                  

                                                                                                  

                                                                                                  

Addendum:                                                                                         

2019                                                                                        

     09:21 Co-signature as Attending Physician, Umberto Bailey MD I agree with the assessment and  c
ha

           plan of care.                                                                          

                                                                                                  

Signatures:                                                                                       

Dispatcher [employer]                           EDArmen Berumen RN RN sg Anderson, Corey, MD MD cha Williams, Irene, RN                     RN                                                      

Umberto Pope PA PA cp Roque, Raymond, RN                      RN   rr5                                                  

                                                                                                  

Corrections: (The following items were deleted from the chart)                                    

                                                                                             

20:43 20:38 2019 20:38 Discharged to Home. Impression: Otitis media, unspecified,       rr5 

      bilateral. Condition is Stable. Forms are Medication Reconciliation Form, Thank You         

      Letter, Antibiotic Education, Prescription Opioid Use. Follow up: Private Physician;        

      When: 2 - 3 days; Reason: Recheck today's complaints. Problem is new. Symptoms have         

      improved. cp                                                                                

                                                                                                  

************************************************************************************************** [FreeTextEntry1] : \par  PMH as noted below:\par  Coronary artery disease status post CABG, which includes LIMA to LAD. 4/2010 [facility]/[place].  Nuclear stress test from 10/17 with no significant perfusion defect. \par  \par  Essential hypertension:  Well controlled. \par  \par  History of having gastric ulcer with a GI bleed in December.  s/p transfusions. GI evaluation for PUD, followed with Dr. Ghosh in the past.\par  \par  Dyslipidemia. Remaining on statin.\par  \par  Moderate mitral regurgitation. Remaining stable.\par  \par  Mild pulmonary hypertension.  No CHF\par  \par  History of depression. improved.\par  \par  History of syncopal episode.  Implantable loop recorder. No significant findings.\par  \par  Bilateral moderate carotid stenosis.  Per past note; No significant change when evaluated in April 2015.  Remaining without neurological events \par

## 2023-12-14 NOTE — REASON FOR VISIT
[Symptom and Test Evaluation] : symptom and test evaluation [Hyperlipidemia] : hyperlipidemia [Hypertension] : hypertension [Coronary Artery Disease] : coronary artery disease [FreeTextEntry3] : Dr. Keller [FreeTextEntry1] : 89-year-old female comes in for follow-up consultation to review further management of her paroxysmal atrial fibrillation and labile hypertension. She was again admitted after last time I saw her because of significant anemia and near syncopal event.  She is being followed with hematologist.  There were no significant cardiac problems. She has had intermittent episodes of atrial fibrillation.  Less than 1% burden.  Not on anticoagulation because of intermittent anemia, history of bleeding, gait and balance abnormality.  She is and she has had lower and high blood pressure requiring as needed use of amlodipine.  She has continued epigastric discomfort. she has been eating small meals. As needed amlodipine for blood pressure greater than 170/100 or symptoms. Improved fatigue and tiredness. She is anxious about it. She has had possible anginal symptoms requiring use of nitroglycerin. Remains fairly active limitation is arthritis.  Walks with a cane. There is no hemoptysis, hematemesis, melena, or black, tarry stool. She has no visual disturbances or focal weakness. She does not have any increased caffeine or alcohol intake. Denies any recent hospital admission

## 2023-12-14 NOTE — DISCUSSION/SUMMARY
[FreeTextEntry1] : Assessment and Suggestions: 89-year-old female with above medical history and active medical problems as noted below -Hiatus hernia.  .  Most likely her chest pain syndrome is more related to gastroesophageal reflux disease rather than unstable CAD.  As needed Zofran use.  Small meals.  Recline position for sleeping.  Constipation management.  Consider GI evaluation.   -Paroxysmal atrial fibrillation.    Not on anticoagulation because of high risk for bleeding complication, history of GI bleed in the past, high risk for falls.  Understands risk benefits alternatives at present.  If high atrial fibrillation burden will discuss regarding anticoagulation versus no anticoagulation versus watchman's device. At present considering inability to use even short-term anticoagulation she would not be a good candidate for watchman's device. - chest pain: As needed nitroglycerin. Coronary artery disease.  CABG.  Decrease aspirin to 81 mg.  Significant GI symptoms off statin therapy. -HTN.  Labile.  As needed use of amlodipine considering intermittent low blood pressure with symptoms of dizziness.  Avoid falls. -hyperlipidemia .  Due to continued significant fatigue tiredness gait abnormality muscle  like it was decided to stop statin therapy.  Understands risk benefits and alternatives. -Non-rheumatic mitral and aortic insufficiency. Concentric LVH . mild pulmonary Hypertension. Clinically, no signs of left or right heart failure.  -PVD.  Carotid atherosclerosis.  Nonobstructive.  Continue aspirin and statin therapy.  No neurological event.  -Gait and balance abnormality.  Safety precaution.  Physical therapy.  And use of cane. -Anxiety and stress.  Sertraline restarted at 25 mg which helped her in the past   Counseling regarding low saturated fat, low carbohydrate intake was reviewed. Active lifestyle and regular. Exercise is along with weight maintenance is advised. I have reviewed above at length. I answered all the questions. Patient verbalized understanding Thank you very much for allowing me to participate in your patient's care. Please feel free to call me for any questions.  Sincerely,  Harpal Locke MD,[facility],JACKY

## 2023-12-14 NOTE — REVIEW OF SYSTEMS
[Feeling Fatigued] : feeling fatigued [Dyspnea on exertion] : dyspnea during exertion [Chest Discomfort] : chest discomfort [Palpitations] : palpitations [Heartburn] : heartburn [Joint Pain] : joint pain [Joint Swelling] : joint swelling [Joint Stiffness] : joint stiffness [Negative] : Heme/Lymph [SOB] : no shortness of breath [Lower Ext Edema] : no extremity edema [Leg Claudication] : no intermittent leg claudication [Orthopnea] : no orthopnea [PND] : no PND [Syncope] : no syncope [FreeTextEntry5] : As per HPI [FreeTextEntry8] : Increased urinary frequency without any dysuria, pain, hematuria, fever [de-identified] : Balance abnormality

## 2023-12-18 ENCOUNTER — APPOINTMENT (OUTPATIENT)
Dept: CARDIOLOGY | Facility: CLINIC | Age: 88
End: 2023-12-18

## 2023-12-18 RX ORDER — NITROGLYCERIN 0.4 MG/1
0.4 TABLET SUBLINGUAL
Qty: 30 | Refills: 1 | Status: ACTIVE | COMMUNITY
Start: 2020-04-21 | End: 1900-01-01

## 2024-02-26 ENCOUNTER — APPOINTMENT (OUTPATIENT)
Dept: CARDIOLOGY | Facility: CLINIC | Age: 89
End: 2024-02-26
Payer: MEDICARE

## 2024-02-26 ENCOUNTER — NON-APPOINTMENT (OUTPATIENT)
Age: 89
End: 2024-02-26

## 2024-02-26 VITALS
WEIGHT: 142 LBS | HEIGHT: 65 IN | OXYGEN SATURATION: 95 % | DIASTOLIC BLOOD PRESSURE: 60 MMHG | HEART RATE: 73 BPM | SYSTOLIC BLOOD PRESSURE: 128 MMHG | BODY MASS INDEX: 23.66 KG/M2

## 2024-02-26 PROCEDURE — 93000 ELECTROCARDIOGRAM COMPLETE: CPT

## 2024-02-26 PROCEDURE — G2211 COMPLEX E/M VISIT ADD ON: CPT

## 2024-02-26 PROCEDURE — 99214 OFFICE O/P EST MOD 30 MIN: CPT

## 2024-02-26 NOTE — HISTORY OF PRESENT ILLNESS
[FreeTextEntry1] : \par  PMH as noted below:\par  Coronary artery disease status post CABG, which includes LIMA to LAD. 4/2010 [facility]/[place].  Nuclear stress test from 10/17 with no significant perfusion defect. \par  \par  Essential hypertension:  Well controlled. \par  \par  History of having gastric ulcer with a GI bleed in December.  s/p transfusions. GI evaluation for PUD, followed with Dr. Ghosh in the past.\par  \par  Dyslipidemia. Remaining on statin.\par  \par  Moderate mitral regurgitation. Remaining stable.\par  \par  Mild pulmonary hypertension.  No CHF\par  \par  History of depression. improved.\par  \par  History of syncopal episode.  Implantable loop recorder. No significant findings.\par  \par  Bilateral moderate carotid stenosis.  Per past note; No significant change when evaluated in April 2015.  Remaining without neurological events \par

## 2024-02-26 NOTE — CARDIOLOGY SUMMARY
[de-identified] : nsr low voltage complexes poor R wave progression.  June 7, 2020 1/14/2022.  Normal sinus rhythm.  Poor R wave progression. March 29, 2023 normal sinus rhythm poor R wave progression normal intervals February 26, 2024.  Normal sinus rhythm.  Poor R wave progression. [de-identified] : Available information on event recorder reviewed [de-identified] : Bilateral carotid Doppler study.  May 24, 2022 mild nonobstructive disease [de-identified] : January 9, 2020.  EF 60 mild mitral regurgitation RVSP 45\par  May 24, 2022..  LVEF 60% mild mitral and aortic regurgitation

## 2024-02-26 NOTE — REASON FOR VISIT
[Symptom and Test Evaluation] : symptom and test evaluation [Hyperlipidemia] : hyperlipidemia [Hypertension] : hypertension [Coronary Artery Disease] : coronary artery disease [FreeTextEntry3] : Dr. Keller [FreeTextEntry1] : 89-year-old female comes in for follow-up consultation to review further management of her paroxysmal atrial fibrillation and labile hypertension. She was again admitted recently her because of significant anemia and near syncopal event.  She is being followed with hematologist.  There were no significant cardiac problems. She has few episodes where she gets significantly hot.  And resolves in a minute or 2.  She attributes those episodes to possible atrial fibrillation.  As you know in the past She has had intermittent episodes of atrial fibrillation.  Less than 1% burden.  Not on anticoagulation because of intermittent anemia, history of bleeding, gait and balance abnormality.  She is and she has had lower and high blood pressure requiring as needed use of amlodipine.  She has continued epigastric discomfort. she has been eating small meals. As needed amlodipine for blood pressure greater than 170/100 or symptoms. Improved fatigue and tiredness. She is anxious about it. She has had possible anginal symptoms requiring use of nitroglycerin. Remains fairly active limitation is arthritis.  Walks with a cane. There is no hemoptysis, hematemesis, melena, or black, tarry stool. She has no visual disturbances or focal weakness. She does not have any increased caffeine or alcohol intake. Denies any recent hospital admission

## 2024-02-26 NOTE — REVIEW OF SYSTEMS
[Feeling Fatigued] : feeling fatigued [Dyspnea on exertion] : dyspnea during exertion [Chest Discomfort] : chest discomfort [Palpitations] : palpitations [Heartburn] : heartburn [Joint Pain] : joint pain [Joint Swelling] : joint swelling [Joint Stiffness] : joint stiffness [Negative] : Heme/Lymph [Leg Claudication] : no intermittent leg claudication [Lower Ext Edema] : no extremity edema [SOB] : no shortness of breath [Orthopnea] : no orthopnea [PND] : no PND [Syncope] : no syncope [FreeTextEntry8] : Increased urinary frequency without any dysuria, pain, hematuria, fever [FreeTextEntry5] : As per HPI [de-identified] : Balance abnormality

## 2024-02-26 NOTE — PHYSICAL EXAM
[Normal] : clear lung fields, good air entry, no respiratory distress [Abnormal Gait] : abnormal gait [No Cyanosis] : no cyanosis [No Clubbing] : no clubbing [Normal Radial B/L] : normal radial B/L [Edema ___] : edema [unfilled] [Normal Speech] : normal speech [Alert and Oriented] : alert and oriented [de-identified] :  regular S1-S2.  Systolic murmur.  No gallop or rub. [de-identified] : duy with a cane

## 2024-02-26 NOTE — DISCUSSION/SUMMARY
[FreeTextEntry1] : Assessment and Suggestions: 89-year-old female with above medical history and active medical problems as noted below -Hiatus hernia.  .  Most likely her chest pain syndrome is more related to gastroesophageal reflux disease rather than unstable CAD.  As needed Zofran use.  Small meals.  Recline position for sleeping.  Constipation management.  Consider GI evaluation.   -Paroxysmal atrial fibrillation.    Not on anticoagulation because of high risk for bleeding complication, history of GI bleed in the past, high risk for falls.  Understands risk benefits alternatives at present.  2 weeks event monitor.  If high atrial fibrillation burden will discuss regarding anticoagulation versus no anticoagulation versus watchman's device.  As well as consider antiarrhythmic medication.  At present considering inability to use even short-term anticoagulation she would not be a good candidate for watchman's device. - chest pain: As needed nitroglycerin. Coronary artery disease.  CABG.  Decrease aspirin to 81 mg.  Significant GI symptoms off statin therapy. -HTN.  Labile.  As needed use of amlodipine considering intermittent low blood pressure with symptoms of dizziness.  Avoid falls. -hyperlipidemia .  Due to continued significant fatigue tiredness gait abnormality muscle  like it was decided to stop statin therapy.  Understands risk benefits and alternatives. -Non-rheumatic mitral and aortic insufficiency. Concentric LVH . mild pulmonary Hypertension. Clinically, no signs of left or right heart failure. f/u echocardiogram  -PVD.  Carotid atherosclerosis.  Nonobstructive.  Continue aspirin and statin therapy.  No neurological event.  -Gait and balance abnormality.  Safety precaution.  Physical therapy.  And use of cane. -Anxiety and stress.  Sertraline restarted at 25 mg which helped her in the past  Counseling regarding low saturated fat, low carbohydrate intake was reviewed. Active lifestyle and regular. Exercise is along with weight maintenance is advised. I have reviewed above at length. I answered all the questions. Patient verbalized understanding Thank you very much for allowing me to participate in your patient's care. Please feel free to call me for any questions.  Sincerely,  Harpal Locke MD,[facility],[place]

## 2024-04-02 ENCOUNTER — APPOINTMENT (OUTPATIENT)
Dept: CARDIOLOGY | Facility: CLINIC | Age: 89
End: 2024-04-02
Payer: MEDICARE

## 2024-04-02 VITALS
DIASTOLIC BLOOD PRESSURE: 70 MMHG | HEART RATE: 69 BPM | SYSTOLIC BLOOD PRESSURE: 140 MMHG | HEIGHT: 65 IN | WEIGHT: 150 LBS | BODY MASS INDEX: 24.99 KG/M2 | OXYGEN SATURATION: 95 %

## 2024-04-02 PROCEDURE — G2211 COMPLEX E/M VISIT ADD ON: CPT

## 2024-04-02 PROCEDURE — 99214 OFFICE O/P EST MOD 30 MIN: CPT

## 2024-04-02 NOTE — DISCUSSION/SUMMARY
[FreeTextEntry1] : Assessment and Suggestions: 89-year-old female with above medical history and active medical problems as noted below -Hiatus hernia.  .  Most likely her chest pain syndrome is more related to gastroesophageal reflux disease rather than unstable CAD.  As needed Zofran use.  Small meals.  Recline position for sleeping.  Constipation management.  Consider GI evaluation.   -Essential hypertension.  Recommended continue with amlodipine 2.5 mg.  If increased blood pressure of greater than 150/90 to take an extra dose.  I am not trying to reduce it to less than 130/80 considering her history of dizziness lightheadedness falls and near syncopal events. -Paroxysmal atrial fibrillation.    Not on anticoagulation because of high risk for bleeding complication, history of GI bleed in the past, high risk for falls.  Understands risk benefits alternatives at present.  Event monitor for 2 weeks showed only 1% atrial fibrillation burden.  Overall relatively asymptomatic.  At present considering inability to use even short-term anticoagulation she would not be a good candidate for watchman's device and definitely not a good candidate for long-term anticoagulation without significantly higher risk.  Has been reviewed with her she understands and agrees with the management plan. - chest pain: As needed nitroglycerin. Coronary artery disease.  CABG.  Decrease aspirin to 81 mg.  Significant GI symptoms off statin therapy. -HTN.  Labile.  As needed use of amlodipine considering intermittent low blood pressure with symptoms of dizziness.  Avoid falls. -hyperlipidemia .  Due to continued significant fatigue tiredness gait abnormality muscle  like it was decided to stop statin therapy.  Understands risk benefits and alternatives. -Non-rheumatic mitral and aortic insufficiency. Concentric LVH . mild pulmonary Hypertension. Clinically, no signs of left or right heart failure. f/u echocardiogram  -PVD.  Carotid atherosclerosis.  Nonobstructive.  Continue aspirin and statin therapy.  No neurological event.  -Gait and balance abnormality.  Safety precaution.  Physical therapy.  And use of cane. -Anxiety and stress.  Sertraline restarted at 25 mg which helped her in the past  Counseling regarding low saturated fat, low carbohydrate intake was reviewed. Active lifestyle and regular. Exercise is along with weight maintenance is advised. I have reviewed above at length. I answered all the questions. Patient verbalized understanding Thank you very much for allowing me to participate in your patient's care. Please feel free to call me for any questions.  Sincerely,  Harpal Locke MD,[facility],[place]

## 2024-04-02 NOTE — REVIEW OF SYSTEMS
[SOB] : no shortness of breath [Lower Ext Edema] : no extremity edema [Leg Claudication] : no intermittent leg claudication [Orthopnea] : no orthopnea [PND] : no PND [Syncope] : no syncope [FreeTextEntry5] : As per HPI [FreeTextEntry8] : Increased urinary frequency without any dysuria, pain, hematuria, fever [de-identified] : Balance abnormality

## 2024-04-02 NOTE — PHYSICAL EXAM
[de-identified] : duy with a cane  [de-identified] :  regular S1-S2.  Systolic murmur.  No gallop or rub.

## 2024-04-02 NOTE — REASON FOR VISIT
[FreeTextEntry3] : Dr. Keller [FreeTextEntry1] : 89-year-old female comes in for follow-up consultation to review further management of her paroxysmal atrial fibrillation and labile hypertension.  I have reviewed her event monitor.  Over the weekend she did not feel well and according to her her blood pressure was significantly elevated at 170/90.  Though majority of the time her blood pressure is remaining around 140/80. She is very stressed in relation to world affairs.  She was again admitted recently her because of significant anemia and near syncopal event.  She is being followed with hematologist.  There were no significant cardiac problems. She has few episodes where she gets significantly hot.  And resolves in a minute or 2.  She attributes those episodes to possible atrial fibrillation.  As you know in the past She has had intermittent episodes of atrial fibrillation.  Less than 1% burden.  Not on anticoagulation because of intermittent anemia, history of bleeding, gait and balance abnormality.  She is and she has had lower and high blood pressure requiring as needed use of amlodipine.  She has continued epigastric discomfort. she has been eating small meals. As needed amlodipine for blood pressure greater than 170/100 or symptoms. Improved fatigue and tiredness. She is anxious about it. She has had possible anginal symptoms requiring use of nitroglycerin. Remains fairly active limitation is arthritis.  Walks with a cane. There is no hemoptysis, hematemesis, melena, or black, tarry stool. She has no visual disturbances or focal weakness. She does not have any increased caffeine or alcohol intake. Denies any recent hospital admission

## 2024-04-02 NOTE — CARDIOLOGY SUMMARY
[de-identified] : nsr low voltage complexes poor R wave progression.  June 7, 2020 1/14/2022.  Normal sinus rhythm.  Poor R wave progression. March 29, 2023 normal sinus rhythm poor R wave progression normal intervals February 26, 2024.  Normal sinus rhythm.  Poor R wave progression. [de-identified] : Event monitor which was done recently showed 1% atrial fibrillation burden.  Ventricular rate was stable at under 90 on average. [de-identified] : January 9, 2020.  EF 60 mild mitral regurgitation RVSP 45\par  May 24, 2022..  LVEF 60% mild mitral and aortic regurgitation [de-identified] : Bilateral carotid Doppler study.  May 24, 2022 mild nonobstructive disease

## 2024-04-02 NOTE — ASSESSMENT
[FreeTextEntry1] : February 26, 2024. EKG from today reviewed Last labs available are from 2023.  Reviewed April 2, 2024. Recent labs and event monitor were reviewed.  Blood pressure was recorded multiple times.  Home blood pressure readings were reviewed.

## 2024-04-02 NOTE — HISTORY OF PRESENT ILLNESS
[FreeTextEntry1] : \par  PMH as noted below:\par  Coronary artery disease status post CABG, which includes LIMA to LAD. 4/2010 [facility]/[place].  Nuclear stress test from 10/17 with no significant perfusion defect. \par  \par  Essential hypertension:  Well controlled. \par  \par  History of having gastric ulcer with a GI bleed in December.  s/p transfusions. GI evaluation for PUD, followed with Dr. Ghosh in the past.\par  \par  Dyslipidemia. Remaining on statin.\par  \par  Moderate mitral regurgitation. Remaining stable.\par  \par  Mild pulmonary hypertension.  No CHF\par  \par  History of depression. improved.\par  \par  History of syncopal episode.  Implantable loop recorder. No significant findings.\par  \par  Bilateral moderate carotid stenosis.  Per past note; No significant change when evaluated in April 2015.  Remaining without neurological events \par

## 2024-04-15 ENCOUNTER — OFFICE (OUTPATIENT)
Dept: URBAN - METROPOLITAN AREA CLINIC 8 | Facility: CLINIC | Age: 89
Setting detail: OPHTHALMOLOGY
End: 2024-04-15
Payer: MEDICARE

## 2024-04-15 DIAGNOSIS — H40.1131: ICD-10-CM

## 2024-04-15 DIAGNOSIS — Z96.1: ICD-10-CM

## 2024-04-15 DIAGNOSIS — H16.223: ICD-10-CM

## 2024-04-15 DIAGNOSIS — H26.492: ICD-10-CM

## 2024-04-15 DIAGNOSIS — H52.4: ICD-10-CM

## 2024-04-15 PROCEDURE — 92015 DETERMINE REFRACTIVE STATE: CPT | Performed by: OPHTHALMOLOGY

## 2024-04-15 PROCEDURE — 99213 OFFICE O/P EST LOW 20 MIN: CPT | Performed by: OPHTHALMOLOGY

## 2024-04-22 ENCOUNTER — APPOINTMENT (OUTPATIENT)
Dept: CARDIOLOGY | Facility: CLINIC | Age: 89
End: 2024-04-22
Payer: MEDICARE

## 2024-04-22 VITALS
HEART RATE: 64 BPM | WEIGHT: 144 LBS | DIASTOLIC BLOOD PRESSURE: 70 MMHG | OXYGEN SATURATION: 92 % | SYSTOLIC BLOOD PRESSURE: 120 MMHG | BODY MASS INDEX: 23.99 KG/M2 | HEIGHT: 65 IN

## 2024-04-22 DIAGNOSIS — I38 ENDOCARDITIS, VALVE UNSPECIFIED: ICD-10-CM

## 2024-04-22 DIAGNOSIS — E78.5 HYPERLIPIDEMIA, UNSPECIFIED: ICD-10-CM

## 2024-04-22 DIAGNOSIS — F32.A DEPRESSION, UNSPECIFIED: ICD-10-CM

## 2024-04-22 PROCEDURE — G2211 COMPLEX E/M VISIT ADD ON: CPT

## 2024-04-22 PROCEDURE — 93306 TTE W/DOPPLER COMPLETE: CPT

## 2024-04-22 PROCEDURE — 99214 OFFICE O/P EST MOD 30 MIN: CPT

## 2024-04-22 RX ORDER — SERTRALINE HYDROCHLORIDE 50 MG/1
50 TABLET, FILM COATED ORAL DAILY
Qty: 45 | Refills: 3 | Status: DISCONTINUED | COMMUNITY
Start: 2023-03-15 | End: 2024-04-22

## 2024-04-22 RX ORDER — SERTRALINE 25 MG/1
25 TABLET, FILM COATED ORAL DAILY
Qty: 90 | Refills: 3 | Status: ACTIVE | COMMUNITY
Start: 1900-01-01 | End: 1900-01-01

## 2024-04-22 NOTE — REVIEW OF SYSTEMS
[Feeling Fatigued] : feeling fatigued [SOB] : no shortness of breath [Dyspnea on exertion] : dyspnea during exertion [Chest Discomfort] : chest discomfort [Lower Ext Edema] : no extremity edema [Leg Claudication] : no intermittent leg claudication [Palpitations] : palpitations [Orthopnea] : no orthopnea [PND] : no PND [Syncope] : no syncope [Heartburn] : heartburn [Joint Pain] : joint pain [Joint Swelling] : joint swelling [Joint Stiffness] : joint stiffness [Negative] : Heme/Lymph [FreeTextEntry5] : As per HPI [FreeTextEntry8] : Increased urinary frequency without any dysuria, pain, hematuria, fever [de-identified] : Balance abnormality

## 2024-04-22 NOTE — DISCUSSION/SUMMARY
[FreeTextEntry1] : Assessment and Suggestions: 89-year-old female with above medical history and active medical problems as noted below -Hiatus hernia.  .  Most likely her chest pain syndrome is more related to gastroesophageal reflux disease rather than unstable CAD.  As needed Zofran use.  Small meals.  Recline position for sleeping.  Constipation management.  Consider GI evaluation.   -Essential hypertension.  Recommended continue with amlodipine 2.5 mg.  If increased blood pressure of greater than 150/90 to take an extra dose.  I am not trying to reduce it to less than 130/80 considering her history of dizziness lightheadedness falls and near syncopal events. -Paroxysmal atrial fibrillation.    Not on anticoagulation because of high risk for bleeding complication, history of GI bleed in the past, high risk for falls.  Understands risk benefits alternatives at present.  Event monitor for 2 weeks showed only 1% atrial fibrillation burden.  Overall relatively asymptomatic.  At present considering inability to use even short-term anticoagulation she would not be a good candidate for watchman's device and definitely not a good candidate for long-term anticoagulation without significantly higher risk.  Has been reviewed with her she understands and agrees with the management plan. - chest pain: As needed nitroglycerin. Coronary artery disease.  CABG.  Decrease aspirin to 81 mg.  Significant GI symptoms off statin therapy. -HTN.  Labile.  As needed use of amlodipine considering intermittent low blood pressure with symptoms of dizziness.  Avoid falls. -hyperlipidemia .  Due to continued significant fatigue tiredness gait abnormality muscle  like it was decided to stop statin therapy.  Understands risk benefits and alternatives. -Non-rheumatic mitral and aortic insufficiency. Concentric LVH . mild pulmonary Hypertension. Clinically, no signs of left or right heart failure. stable echo 4/22/24  -PVD.  Carotid atherosclerosis.  Nonobstructive.  Continue aspirin and statin therapy.  No neurological event.  -Gait and balance abnormality.  Safety precaution.  Physical therapy.  And use of cane. -Anxiety and stress.  Sertraline restarted at 25 mg which helped her in the past  Counseling regarding low saturated fat, low carbohydrate intake was reviewed. Active lifestyle and regular. Exercise is along with weight maintenance is advised. I have reviewed above at length. I answered all the questions. Patient verbalized understanding Thank you very much for allowing me to participate in your patient's care. Please feel free to call me for any questions.  Sincerely,  Harpal Locke MD,[facility],[place]

## 2024-04-22 NOTE — REASON FOR VISIT
[Symptom and Test Evaluation] : symptom and test evaluation [Hyperlipidemia] : hyperlipidemia [Hypertension] : hypertension [Coronary Artery Disease] : coronary artery disease [FreeTextEntry3] : Dr. Keller [FreeTextEntry1] : 89-year-old female comes in for follow-up consultation to review further management of her paroxysmal atrial fibrillation and labile hypertension.  Also to review her echocardiogram.  I have reviewed her event monitor.  since last seen she has been doing much better.  With improved episodes of palpitations.  No significant new dizziness or lightheadedness.  She is very stressed in relation to world affairs. Continued stable depression.  Using sertraline.  She was again admitted recently her because of significant anemia and near syncopal event.  She is being followed with hematologist.  There were no significant cardiac problems. She has had intermittent episodes of atrial fibrillation.  Less than 1% burden.  Not on anticoagulation because of intermittent anemia, history of bleeding, gait and balance abnormality.  She is and she has had lower and high blood pressure requiring as needed use of amlodipine.  She has continued epigastric discomfort. she has been eating small meals. As needed amlodipine for blood pressure greater than 170/100 or symptoms. Improved fatigue and tiredness. She is anxious about it. She has had possible anginal symptoms requiring use of nitroglycerin. Remains fairly active limitation is arthritis.  Walks with a cane. There is no hemoptysis, hematemesis, melena, or black, tarry stool. She has no visual disturbances or focal weakness. She does not have any increased caffeine or alcohol intake. Denies any recent hospital admission

## 2024-04-22 NOTE — ASSESSMENT
[FreeTextEntry1] : February 26, 2024. EKG from today reviewed Last labs available are from 2023.  Reviewed April 2, 2024. Recent labs and event monitor were reviewed.  Blood pressure was recorded multiple times.  Home blood pressure readings were reviewed.  Reviewed on April 22, 2024.  Echocardiogram from today reviewed.

## 2024-04-22 NOTE — HISTORY OF PRESENT ILLNESS
[FreeTextEntry1] : \par  PMH as noted below:\par  Coronary artery disease status post CABG, which includes LIMA to LAD. 4/2010 [facility]/[place].  Nuclear stress test from 10/17 with no significant perfusion defect. \par  \par  Essential hypertension:  Well controlled. \par  \par  History of having gastric ulcer with a GI bleed in December.  s/p transfusions. GI evaluation for PUD, followed with Dr. Ghosh in the past.\par  \par  Dyslipidemia. Remaining on statin.\par  \par  Moderate mitral regurgitation. Remaining stable.\par  \par  Mild pulmonary hypertension.  No CHF\par  \par  History of depression. improved.\par  \par  History of syncopal episode.  Implantable loop recorder. No significant findings.\par  \par  Bilateral moderate carotid stenosis.  Per past note; No significant change when evaluated in April 2015.  Remaining without neurological events \par

## 2024-04-22 NOTE — CARDIOLOGY SUMMARY
[de-identified] : nsr low voltage complexes poor R wave progression.  June 7, 2020 1/14/2022.  Normal sinus rhythm.  Poor R wave progression. March 29, 2023 normal sinus rhythm poor R wave progression normal intervals February 26, 2024.  Normal sinus rhythm.  Poor R wave progression. [de-identified] : Event monitor which was done recently showed 1% atrial fibrillation burden.  Ventricular rate was stable at under 90 on average. [de-identified] : January 9, 2020.  EF 60 mild mitral regurgitation RVSP 45 May 24, 2022..  LVEF 60% mild mitral and aortic regurgitation April 22, 2024.  LVEF 65%.  Minimal valvular abnormality. [de-identified] : Bilateral carotid Doppler study.  May 24, 2022 mild nonobstructive disease

## 2024-04-22 NOTE — PHYSICAL EXAM
[Normal] : clear lung fields, good air entry, no respiratory distress [Abnormal Gait] : abnormal gait [No Cyanosis] : no cyanosis [No Clubbing] : no clubbing [Normal Radial B/L] : normal radial B/L [Edema ___] : edema [unfilled] [Normal Speech] : normal speech [Alert and Oriented] : alert and oriented [de-identified] :  regular S1-S2.  Systolic murmur.  No gallop or rub. [de-identified] : duy with a cane

## 2024-06-12 ENCOUNTER — APPOINTMENT (OUTPATIENT)
Dept: CARDIOLOGY | Facility: CLINIC | Age: 89
End: 2024-06-12

## 2024-06-25 ENCOUNTER — APPOINTMENT (OUTPATIENT)
Dept: CARDIOLOGY | Facility: CLINIC | Age: 89
End: 2024-06-25
Payer: MEDICARE

## 2024-06-25 ENCOUNTER — NON-APPOINTMENT (OUTPATIENT)
Age: 89
End: 2024-06-25

## 2024-06-25 VITALS
OXYGEN SATURATION: 88 % | HEART RATE: 70 BPM | WEIGHT: 150 LBS | DIASTOLIC BLOOD PRESSURE: 60 MMHG | SYSTOLIC BLOOD PRESSURE: 128 MMHG | BODY MASS INDEX: 24.99 KG/M2 | HEIGHT: 65 IN

## 2024-06-25 DIAGNOSIS — I10 ESSENTIAL (PRIMARY) HYPERTENSION: ICD-10-CM

## 2024-06-25 DIAGNOSIS — I25.10 ATHEROSCLEROTIC HEART DISEASE OF NATIVE CORONARY ARTERY W/OUT ANGINA PECTORIS: ICD-10-CM

## 2024-06-25 DIAGNOSIS — I27.20 PULMONARY HYPERTENSION, UNSPECIFIED: ICD-10-CM

## 2024-06-25 DIAGNOSIS — I48.0 PAROXYSMAL ATRIAL FIBRILLATION: ICD-10-CM

## 2024-06-25 DIAGNOSIS — R42 DIZZINESS AND GIDDINESS: ICD-10-CM

## 2024-06-25 PROCEDURE — 99214 OFFICE O/P EST MOD 30 MIN: CPT

## 2024-06-25 PROCEDURE — G2211 COMPLEX E/M VISIT ADD ON: CPT

## 2024-06-25 PROCEDURE — 93000 ELECTROCARDIOGRAM COMPLETE: CPT

## 2024-06-25 RX ORDER — AMLODIPINE BESYLATE 2.5 MG/1
2.5 TABLET ORAL
Qty: 90 | Refills: 1 | Status: ACTIVE | COMMUNITY
Start: 1900-01-01 | End: 1900-01-01

## 2024-06-25 RX ORDER — ISOSORBIDE MONONITRATE 30 MG/1
30 TABLET, EXTENDED RELEASE ORAL DAILY
Qty: 90 | Refills: 3 | Status: ACTIVE | COMMUNITY
Start: 2024-06-25 | End: 1900-01-01

## 2024-07-10 ENCOUNTER — OFFICE (OUTPATIENT)
Dept: URBAN - METROPOLITAN AREA CLINIC 8 | Facility: CLINIC | Age: 89
Setting detail: OPHTHALMOLOGY
End: 2024-07-10
Payer: MEDICARE

## 2024-07-10 DIAGNOSIS — H00.11: ICD-10-CM

## 2024-07-10 PROCEDURE — 99213 OFFICE O/P EST LOW 20 MIN: CPT | Performed by: OPHTHALMOLOGY

## 2024-07-10 ASSESSMENT — CONFRONTATIONAL VISUAL FIELD TEST (CVF)
OD_FINDINGS: FULL
OS_FINDINGS: FULL

## 2024-08-06 ENCOUNTER — APPOINTMENT (OUTPATIENT)
Dept: CARDIOLOGY | Facility: CLINIC | Age: 89
End: 2024-08-06

## 2024-08-06 ENCOUNTER — NON-APPOINTMENT (OUTPATIENT)
Age: 89
End: 2024-08-06

## 2024-08-13 ENCOUNTER — APPOINTMENT (OUTPATIENT)
Dept: CARDIOLOGY | Facility: CLINIC | Age: 89
End: 2024-08-13
Payer: MEDICARE

## 2024-08-13 VITALS
BODY MASS INDEX: 24.32 KG/M2 | DIASTOLIC BLOOD PRESSURE: 48 MMHG | SYSTOLIC BLOOD PRESSURE: 96 MMHG | HEART RATE: 62 BPM | HEIGHT: 65 IN | WEIGHT: 146 LBS | OXYGEN SATURATION: 93 %

## 2024-08-13 DIAGNOSIS — I25.10 ATHEROSCLEROTIC HEART DISEASE OF NATIVE CORONARY ARTERY W/OUT ANGINA PECTORIS: ICD-10-CM

## 2024-08-13 DIAGNOSIS — I49.9 CARDIAC ARRHYTHMIA, UNSPECIFIED: ICD-10-CM

## 2024-08-13 DIAGNOSIS — R55 SYNCOPE AND COLLAPSE: ICD-10-CM

## 2024-08-13 DIAGNOSIS — E78.5 HYPERLIPIDEMIA, UNSPECIFIED: ICD-10-CM

## 2024-08-13 DIAGNOSIS — R07.89 OTHER CHEST PAIN: ICD-10-CM

## 2024-08-13 DIAGNOSIS — R94.31 ABNORMAL ELECTROCARDIOGRAM [ECG] [EKG]: ICD-10-CM

## 2024-08-13 DIAGNOSIS — Z95.1 PRESENCE OF AORTOCORONARY BYPASS GRAFT: ICD-10-CM

## 2024-08-13 PROCEDURE — 99214 OFFICE O/P EST MOD 30 MIN: CPT

## 2024-08-13 RX ORDER — APIXABAN 5 MG/1
5 TABLET, FILM COATED ORAL TWICE DAILY
Refills: 0 | Status: ACTIVE | COMMUNITY

## 2024-08-13 NOTE — DISCUSSION/SUMMARY
[FreeTextEntry1] : BOOM HOLGUIN is a 89 year old F who presents today Aug 13, 2024 with the above history and the following active issues:  HFU syncope 2/2 symptomatic bradycardia vs TIA/CVA. Sx's of fatigue/weakness/dizziness/lightheadedness. Advised to f/u with neuro, Dr. Shah. Referral placed. ILR battery depleted. 2 week live Zio ordered. Will likely send to EP once reviewed. Close clinical follow up. Come back to office in 2 weeks to review.   HTN: Continue Amlodipine 2.5mg daily. Educated patient on low salt diet, alcohol intake in moderation, regular cardiovascular exercise, and weight reduction for improved BP control. Continue to monitor BP at home and call for persistently elevated readings (>140/90).   -Paroxysmal atrial fibrillation. Now on Eliquis 5mg BID. Educated patient on pathophysiology of atrial fibrillation and natural progression as well as associated risk of cardioembolic event. Informed her on indications for long term anticoagulation. Currently there is no unusual bleeding on NOAC, continue current dose. Reviewed bleeding precautions extensively. She now has 24-7 care at home.     Coronary artery disease. CABG. Decrease aspirin to 81 mg. Significant GI symptoms off statin therapy as prev noted. Has Nitro PRN, but has not taken it recently.  -hyperlipidemia. Due to continued significant fatigue tiredness gait abnormality muscle like it was decided to stop statin therapy. Understands risk benefits and alternatives. Consider at f/u OV.  -Non-rheumatic mitral and aortic insufficiency. Concentric LVH. mild pulmonary Hypertension. See most recent echo above. Cont. to monitor.  -PVD. Carotid atherosclerosis. Nonobstructive. Continue aspirin.  -Gait and balance abnormality. Safety precaution. Physical therapy. And use of cane.  -Anxiety and stress. Sertraline restarted at 25 mg which helped her in the past   Advised f/u with PCP and neuro. Hospital records given to patient.   F/U in 2 weeks to review live Zio. Discussed red flag symptoms, which would warrant sooner or emergent medical evaluation. Any questions and concerns were addressed and resolved.    Sincerely, Tavni Feliciano [facility] Patient's history, testing, and plan was reviewed with supervising physician, Dr. Harpal Locke

## 2024-08-13 NOTE — DISCUSSION/SUMMARY
[FreeTextEntry1] : BOOM HOLGUIN is a 89 year old F who presents today Aug 13, 2024 with the above history and the following active issues:  HFU syncope 2/2 symptomatic bradycardia vs TIA/CVA. Sx's of fatigue/weakness/dizziness/lightheadedness. Advised to f/u with neuro, Dr. Shah. Referral placed. ILR battery depleted. 2 week live Zio ordered. Will likely send to EP once reviewed. Close clinical follow up. Come back to office in 2 weeks to review.   HTN: Continue Amlodipine 2.5mg daily. Educated patient on low salt diet, alcohol intake in moderation, regular cardiovascular exercise, and weight reduction for improved BP control. Continue to monitor BP at home and call for persistently elevated readings (>140/90).   -Paroxysmal atrial fibrillation. Now on Eliquis 5mg BID. Educated patient on pathophysiology of atrial fibrillation and natural progression as well as associated risk of cardioembolic event. Informed her on indications for long term anticoagulation. Currently there is no unusual bleeding on NOAC, continue current dose. Reviewed bleeding precautions extensively. She now has 24-7 care at home.     Coronary artery disease. CABG. Decrease aspirin to 81 mg. Significant GI symptoms off statin therapy as prev noted. Has Nitro PRN, but has not taken it recently.  -hyperlipidemia. Due to continued significant fatigue tiredness gait abnormality muscle like it was decided to stop statin therapy. Understands risk benefits and alternatives. Consider at f/u OV.  -Non-rheumatic mitral and aortic insufficiency. Concentric LVH. mild pulmonary Hypertension. See most recent echo above. Cont. to monitor.  -PVD. Carotid atherosclerosis. Nonobstructive. Continue aspirin.  -Gait and balance abnormality. Safety precaution. Physical therapy. And use of cane.  -Anxiety and stress. Sertraline restarted at 25 mg which helped her in the past   Advised f/u with PCP and neuro. Hospital records given to patient.   F/U in 2 weeks to review live Zio. Discussed red flag symptoms, which would warrant sooner or emergent medical evaluation. Any questions and concerns were addressed and resolved.    Sincerely, Tanvi Feliciano [facility] Patient's history, testing, and plan was reviewed with supervising physician, Dr. Harpal Locke

## 2024-08-13 NOTE — CARDIOLOGY SUMMARY
[de-identified] : nsr low voltage complexes poor R wave progression.  June 7, 2020 1/14/2022.  Normal sinus rhythm.  Poor R wave progression. March 29, 2023 normal sinus rhythm poor R wave progression normal intervals June 25, 2024 normal sinus rhythm PVCs February 26, 2024.  Normal sinus rhythm.  Poor R wave progression. [de-identified] : Event monitor which was done recently showed 1% atrial fibrillation burden.  Ventricular rate was stable at under 90 on average. [de-identified] : January 9, 2020.  EF 60 mild mitral regurgitation RVSP 45 May 24, 2022..  LVEF 60% mild mitral and aortic regurgitation April 22, 2024.  LVEF 65%.  Minimal valvular abnormality. [de-identified] : Bilateral carotid Doppler study.  May 24, 2022 mild nonobstructive disease

## 2024-08-13 NOTE — DISCUSSION/SUMMARY
[FreeTextEntry1] : BOOM HOLGUIN is a 89 year old F who presents today Aug 13, 2024 with the above history and the following active issues:  HFU syncope 2/2 symptomatic bradycardia vs TIA/CVA. Sx's of fatigue/weakness/dizziness/lightheadedness. Advised to f/u with neuro, Dr. Shah. Referral placed. ILR battery depleted. 2 week live Zio ordered. Will likely send to EP once reviewed. Close clinical follow up. Come back to office in 2 weeks to review.   HTN: Continue Amlodipine 2.5mg daily. Educated patient on low salt diet, alcohol intake in moderation, regular cardiovascular exercise, and weight reduction for improved BP control. Continue to monitor BP at home and call for persistently elevated readings (>140/90).   -Paroxysmal atrial fibrillation. Now on Eliquis 5mg BID. Educated patient on pathophysiology of atrial fibrillation and natural progression as well as associated risk of cardioembolic event. Informed her on indications for long term anticoagulation. Currently there is no unusual bleeding on NOAC, continue current dose. Reviewed bleeding precautions extensively. She now has 24-7 care at home.     Coronary artery disease. CABG. Decrease aspirin to 81 mg. Significant GI symptoms off statin therapy as prev noted. Has Nitro PRN, but has not taken it recently.  -hyperlipidemia. Due to continued significant fatigue tiredness gait abnormality muscle like it was decided to stop statin therapy. Understands risk benefits and alternatives. Consider at f/u OV.  -Non-rheumatic mitral and aortic insufficiency. Concentric LVH. mild pulmonary Hypertension. See most recent echo above. Cont. to monitor.  -PVD. Carotid atherosclerosis. Nonobstructive. Continue aspirin.  -Gait and balance abnormality. Safety precaution. Physical therapy. And use of cane.  -Anxiety and stress. Sertraline restarted at 25 mg which helped her in the past   Advised f/u with PCP and neuro. Hospital records given to patient.   F/U in 2 weeks to review live Zio. Discussed red flag symptoms, which would warrant sooner or emergent medical evaluation. Any questions and concerns were addressed and resolved.    Sincerely, Tanvi Feliciano [facility] Patient's history, testing, and plan was reviewed with supervising physician, Dr. Harpal Locke

## 2024-08-13 NOTE — REVIEW OF SYSTEMS
[Feeling Fatigued] : feeling fatigued [SOB] : no shortness of breath [Dyspnea on exertion] : dyspnea during exertion [Chest Discomfort] : chest discomfort [Lower Ext Edema] : no extremity edema [Leg Claudication] : no intermittent leg claudication [Palpitations] : palpitations [Orthopnea] : no orthopnea [PND] : no PND [Syncope] : no syncope [Heartburn] : heartburn [Joint Pain] : joint pain [Joint Swelling] : joint swelling [Joint Stiffness] : joint stiffness [FreeTextEntry8] : Increased urinary frequency without any dysuria, pain, hematuria, fever [de-identified] : Balance abnormality [Negative] : Heme/Lymph [FreeTextEntry5] : see HPI

## 2024-08-13 NOTE — CARDIOLOGY SUMMARY
[de-identified] : nsr low voltage complexes poor R wave progression.  June 7, 2020 1/14/2022.  Normal sinus rhythm.  Poor R wave progression. March 29, 2023 normal sinus rhythm poor R wave progression normal intervals June 25, 2024 normal sinus rhythm PVCs February 26, 2024.  Normal sinus rhythm.  Poor R wave progression. [de-identified] : Event monitor which was done recently showed 1% atrial fibrillation burden.  Ventricular rate was stable at under 90 on average. [de-identified] : January 9, 2020.  EF 60 mild mitral regurgitation RVSP 45 May 24, 2022..  LVEF 60% mild mitral and aortic regurgitation April 22, 2024.  LVEF 65%.  Minimal valvular abnormality. [de-identified] : Bilateral carotid Doppler study.  May 24, 2022 mild nonobstructive disease

## 2024-08-13 NOTE — ASSESSMENT
[FreeTextEntry1] : February 26, 2024. EKG from today reviewed Last labs available are from 2023.  Reviewed April 2, 2024. Recent labs and event monitor were reviewed.  Blood pressure was recorded multiple times.  Home blood pressure readings were reviewed.  Reviewed on April 22, 2024.  Echocardiogram from today reviewed.  Reviewed June 25, 2024.  EKG as noted above Labs from Jasmina 3, 2024 mild normocytic anemia hemoglobin 11.9 sodium 138 potassium 4.5 creatinine 0.95 LFT normal.  Total cholesterol 212 triglycerides 233 HDL 41  TSH was 1.62.

## 2024-08-13 NOTE — HISTORY OF PRESENT ILLNESS
[FreeTextEntry1] : BOOM HOLGUIN is a 89 year old female with a past medical history of:  Paroxysmal afib  Coronary artery disease status post CABG, which includes LIMA to LAD. 4/2010 [facility]/[place]. Nuclear stress test from 10/17 with no significant perfusion defect.  Essential hypertension: Well controlled.  History of having gastric ulcer with a GI bleed in December. s/p transfusions. GI evaluation for PUD, followed with Dr. Ghosh in the past.  Dyslipidemia. Remaining on statin.  Moderate mitral regurgitation. Remaining stable.  Mild pulmonary hypertension. No CHF  History of depression. improved.  History of syncopal episode. Implantable loop recorder. No significant findings.  Bilateral moderate carotid stenosis. Per past note; No significant change when evaluated in April 2015. Remaining without neurological events  Last seen 6/25/24: Left arm and shoulder pain noted. Isosorbide added. At present considering her age decided against any ischemic evaluation unless continued uncontrolled unstable CAD symptoms. Amlodipine decreased. In the interim she had an event and was seen in [facility]. Had syncopal episode. She was at the [facility] salon and after getting her hair washed, she sat down at a chair to get the hair cut. As she was sitting down, she noted a strong right sided headche and feeling as if she were going to "pass out" and then proceeded to for about 30 seconds. She did not fall out of her chair or strike her head. Unbeknownst to her, she then had about 5-10 minutes of slurring speech and word-finding difficulty as well as the inability to recognize her daughter who was waiting for her. By the time she arrived to the ED, this episode had stopped. She stated the headache had also resolved. In the ED, a CTH was negative for stroke and hemorrhage. UA was negative. She was given heparin SQ Q12 hours. Admitted to medicine for syncope 2/2 symptomatic bradycardia vs TIA/CVA. On 7/28 at 730, rate controlled afib was detected on tele and at 8 am she reported new onset left sided numbness, weakness, and slurred speech; code BAT was called. CTA demonstrated no acute pathology. CT brain demonstrated scattered supratentorial and BL basal ganglia areas of hypodensity would favor involutional changes, no acute changes. MRI brain: Extensive microvascular ischemic changes in the supratentorium and brainstem. No evidence for diffuse restriction. She was started on Eliquis 5mg BID and recommended to take atorvastatin 80mg daily (not on statin post discharge). Cards saw her and rec against rate control meds secondary to resting bradycardia. Episode of hypertension that was improved with hydralazine IVP. Neuro consult on 7/29 recommended continuing  [id number] BID and start Atorvastatin 80mg daily with a BP control < 130/80., would benefit from LUIS/home PT. Neuro consult on 7/30 rec obtaining apolipoprotein A and consider obtaining neuropathy panel to w/u decreased sensation in BLLE. Card consult on 7/29 rec continuation of amlodipine 2.5mg daily and would rec starting Losartan 25mg daily. Patient was DC'ed home. In interim, SBP 60's on home cuff and Losartan was dc'ed by PCP. Presents today for HFU. Denies CP, left arm pain, PND, orthopnea, palpitations, and claudication. Reports pain from her hiatal hernia, lightheadedness, dizziness, BLLE edema (none sig on exam), and weakness/fatigue. Former smoker. Of note she is no longer on Isosorbide Mononitrate. Goddaughter states it was dc'ed in the hospital.  /50 on my exam and 127/66 with home cuff sitting.  120/60 standing; taken by Dr. Locke.  Testing:  Labs 7/30/24: Na 142, K 3.8, Cr 1.08, Ca 9.6, Mag 2.1, AST 17, ALT 11, WBC 6.78, Hgb 10.1, HCT 32.7, plt 229. Apolipoprotein A 129, Apolipoprotein b 94  EKG 7/30/24: SR with 1st degree AV block with PACs at 72 bpm, AK interval 254 ms, QTc 424 ms, Q waves lead III, PRWP, nonspecific ST-T wave abnormalities, PACs, nonspecific ST-T wave abnormalities   Labs 7/29/24: Chol 184, Trigs 132, HDL 42, ,   CXR 7/30/24: No infiltrates or effusions. S/p CABG. Large hiatal hernia.  Echo 7/27/24: EF 60%. Mild DD with elevated filling pressures. Normal RVSF. Aortic sclerosis without stenosis. Mild MR. Mild TR. PASP 47mmHg. No pericardial effusion.  ECG:  February 26, 2024. Normal sinus rhythm. Poor R wave progression.    Remote/Ambulatory Rhythm Monitoring: Event monitor which was done recently showed 1% atrial fibrillation burden. Ventricular rate was stable at under 90 on average.    Echo: January 9, 2020. EF 60 mild mitral regurgitation RVSP 45 May 24, 2022.. LVEF 60% mild mitral and aortic regurgitation April 22, 2024. LVEF 65%. Minimal valvular abnormality.    Carotid/Aorta/Peripheral Vascular: Bilateral carotid Doppler study. May 24, 2022 mild nonobstructive disease  [employer] 2017: Lexiscan. Equivocal by EKG. Normal SPECT. Diaphragmatic artifact.  Abd u/s 2010: No AAA. No prominent or mobile plaque noted.

## 2024-08-13 NOTE — REVIEW OF SYSTEMS
[Feeling Fatigued] : feeling fatigued [SOB] : no shortness of breath [Dyspnea on exertion] : dyspnea during exertion [Chest Discomfort] : chest discomfort [Lower Ext Edema] : no extremity edema [Leg Claudication] : no intermittent leg claudication [Palpitations] : palpitations [Orthopnea] : no orthopnea [PND] : no PND [Syncope] : no syncope [Heartburn] : heartburn [Joint Pain] : joint pain [Joint Swelling] : joint swelling [Joint Stiffness] : joint stiffness [FreeTextEntry8] : Increased urinary frequency without any dysuria, pain, hematuria, fever [de-identified] : Balance abnormality [Negative] : Heme/Lymph [FreeTextEntry5] : see HPI

## 2024-08-13 NOTE — CARDIOLOGY SUMMARY
[de-identified] : nsr low voltage complexes poor R wave progression.  June 7, 2020 1/14/2022.  Normal sinus rhythm.  Poor R wave progression. March 29, 2023 normal sinus rhythm poor R wave progression normal intervals June 25, 2024 normal sinus rhythm PVCs February 26, 2024.  Normal sinus rhythm.  Poor R wave progression. [de-identified] : Event monitor which was done recently showed 1% atrial fibrillation burden.  Ventricular rate was stable at under 90 on average. [de-identified] : January 9, 2020.  EF 60 mild mitral regurgitation RVSP 45 May 24, 2022..  LVEF 60% mild mitral and aortic regurgitation April 22, 2024.  LVEF 65%.  Minimal valvular abnormality. [de-identified] : Bilateral carotid Doppler study.  May 24, 2022 mild nonobstructive disease

## 2024-08-13 NOTE — HISTORY OF PRESENT ILLNESS
[FreeTextEntry1] : BOOM HOLGUIN is a 89 year old female with a past medical history of:  Paroxysmal afib  Coronary artery disease status post CABG, which includes LIMA to LAD. 4/2010 [facility]/[place]. Nuclear stress test from 10/17 with no significant perfusion defect.  Essential hypertension: Well controlled.  History of having gastric ulcer with a GI bleed in December. s/p transfusions. GI evaluation for PUD, followed with Dr. Ghosh in the past.  Dyslipidemia. Remaining on statin.  Moderate mitral regurgitation. Remaining stable.  Mild pulmonary hypertension. No CHF  History of depression. improved.  History of syncopal episode. Implantable loop recorder. No significant findings.  Bilateral moderate carotid stenosis. Per past note; No significant change when evaluated in April 2015. Remaining without neurological events  Last seen 6/25/24: Left arm and shoulder pain noted. Isosorbide added. At present considering her age decided against any ischemic evaluation unless continued uncontrolled unstable CAD symptoms. Amlodipine decreased. In the interim she had an event and was seen in [facility]. Had syncopal episode. She was at the [facility] salon and after getting her hair washed, she sat down at a chair to get the hair cut. As she was sitting down, she noted a strong right sided headche and feeling as if she were going to "pass out" and then proceeded to for about 30 seconds. She did not fall out of her chair or strike her head. Unbeknownst to her, she then had about 5-10 minutes of slurring speech and word-finding difficulty as well as the inability to recognize her daughter who was waiting for her. By the time she arrived to the ED, this episode had stopped. She stated the headache had also resolved. In the ED, a CTH was negative for stroke and hemorrhage. UA was negative. She was given heparin SQ Q12 hours. Admitted to medicine for syncope 2/2 symptomatic bradycardia vs TIA/CVA. On 7/28 at 730, rate controlled afib was detected on tele and at 8 am she reported new onset left sided numbness, weakness, and slurred speech; code BAT was called. CTA demonstrated no acute pathology. CT brain demonstrated scattered supratentorial and BL basal ganglia areas of hypodensity would favor involutional changes, no acute changes. MRI brain: Extensive microvascular ischemic changes in the supratentorium and brainstem. No evidence for diffuse restriction. She was started on Eliquis 5mg BID and recommended to take atorvastatin 80mg daily (not on statin post discharge). Cards saw her and rec against rate control meds secondary to resting bradycardia. Episode of hypertension that was improved with hydralazine IVP. Neuro consult on 7/29 recommended continuing  [id number] BID and start Atorvastatin 80mg daily with a BP control < 130/80., would benefit from LUIS/home PT. Neuro consult on 7/30 rec obtaining apolipoprotein A and consider obtaining neuropathy panel to w/u decreased sensation in BLLE. Card consult on 7/29 rec continuation of amlodipine 2.5mg daily and would rec starting Losartan 25mg daily. Patient was DC'ed home. In interim, SBP 60's on home cuff and Losartan was dc'ed by PCP. Presents today for HFU. Denies CP, left arm pain, PND, orthopnea, palpitations, and claudication. Reports pain from her hiatal hernia, lightheadedness, dizziness, BLLE edema (none sig on exam), and weakness/fatigue. Former smoker. Of note she is no longer on Isosorbide Mononitrate. Goddaughter states it was dc'ed in the hospital.  /50 on my exam and 127/66 with home cuff sitting.  120/60 standing; taken by Dr. Locke.  Testing:  Labs 7/30/24: Na 142, K 3.8, Cr 1.08, Ca 9.6, Mag 2.1, AST 17, ALT 11, WBC 6.78, Hgb 10.1, HCT 32.7, plt 229. Apolipoprotein A 129, Apolipoprotein b 94  EKG 7/30/24: SR with 1st degree AV block with PACs at 72 bpm, DE interval 254 ms, QTc 424 ms, Q waves lead III, PRWP, nonspecific ST-T wave abnormalities, PACs, nonspecific ST-T wave abnormalities   Labs 7/29/24: Chol 184, Trigs 132, HDL 42, ,   CXR 7/30/24: No infiltrates or effusions. S/p CABG. Large hiatal hernia.  Echo 7/27/24: EF 60%. Mild DD with elevated filling pressures. Normal RVSF. Aortic sclerosis without stenosis. Mild MR. Mild TR. PASP 47mmHg. No pericardial effusion.  ECG:  February 26, 2024. Normal sinus rhythm. Poor R wave progression.    Remote/Ambulatory Rhythm Monitoring: Event monitor which was done recently showed 1% atrial fibrillation burden. Ventricular rate was stable at under 90 on average.    Echo: January 9, 2020. EF 60 mild mitral regurgitation RVSP 45 May 24, 2022.. LVEF 60% mild mitral and aortic regurgitation April 22, 2024. LVEF 65%. Minimal valvular abnormality.    Carotid/Aorta/Peripheral Vascular: Bilateral carotid Doppler study. May 24, 2022 mild nonobstructive disease  [employer] 2017: Lexiscan. Equivocal by EKG. Normal SPECT. Diaphragmatic artifact.  Abd u/s 2010: No AAA. No prominent or mobile plaque noted.

## 2024-08-13 NOTE — HISTORY OF PRESENT ILLNESS
[FreeTextEntry1] : BOOM HOLGUIN is a 89 year old female with a past medical history of:  Paroxysmal afib  Coronary artery disease status post CABG, which includes LIMA to LAD. 4/2010 [facility]/[place]. Nuclear stress test from 10/17 with no significant perfusion defect.  Essential hypertension: Well controlled.  History of having gastric ulcer with a GI bleed in December. s/p transfusions. GI evaluation for PUD, followed with Dr. Ghosh in the past.  Dyslipidemia. Remaining on statin.  Moderate mitral regurgitation. Remaining stable.  Mild pulmonary hypertension. No CHF  History of depression. improved.  History of syncopal episode. Implantable loop recorder. No significant findings.  Bilateral moderate carotid stenosis. Per past note; No significant change when evaluated in April 2015. Remaining without neurological events  Last seen 6/25/24: Left arm and shoulder pain noted. Isosorbide added. At present considering her age decided against any ischemic evaluation unless continued uncontrolled unstable CAD symptoms. Amlodipine decreased. In the interim she had an event and was seen in [facility]. Had syncopal episode. She was at the [facility] salon and after getting her hair washed, she sat down at a chair to get the hair cut. As she was sitting down, she noted a strong right sided headche and feeling as if she were going to "pass out" and then proceeded to for about 30 seconds. She did not fall out of her chair or strike her head. Unbeknownst to her, she then had about 5-10 minutes of slurring speech and word-finding difficulty as well as the inability to recognize her daughter who was waiting for her. By the time she arrived to the ED, this episode had stopped. She stated the headache had also resolved. In the ED, a CTH was negative for stroke and hemorrhage. UA was negative. She was given heparin SQ Q12 hours. Admitted to medicine for syncope 2/2 symptomatic bradycardia vs TIA/CVA. On 7/28 at 730, rate controlled afib was detected on tele and at 8 am she reported new onset left sided numbness, weakness, and slurred speech; code BAT was called. CTA demonstrated no acute pathology. CT brain demonstrated scattered supratentorial and BL basal ganglia areas of hypodensity would favor involutional changes, no acute changes. MRI brain: Extensive microvascular ischemic changes in the supratentorium and brainstem. No evidence for diffuse restriction. She was started on Eliquis 5mg BID and recommended to take atorvastatin 80mg daily (not on statin post discharge). Cards saw her and rec against rate control meds secondary to resting bradycardia. Episode of hypertension that was improved with hydralazine IVP. Neuro consult on 7/29 recommended continuing  [id number] BID and start Atorvastatin 80mg daily with a BP control < 130/80., would benefit from LUIS/home PT. Neuro consult on 7/30 rec obtaining apolipoprotein A and consider obtaining neuropathy panel to w/u decreased sensation in BLLE. Card consult on 7/29 rec continuation of amlodipine 2.5mg daily and would rec starting Losartan 25mg daily. Patient was DC'ed home. In interim, SBP 60's on home cuff and Losartan was dc'ed by PCP. Presents today for HFU. Denies CP, left arm pain, PND, orthopnea, palpitations, and claudication. Reports pain from her hiatal hernia, lightheadedness, dizziness, BLLE edema (none sig on exam), and weakness/fatigue. Former smoker. Of note she is no longer on Isosorbide Mononitrate. Goddaughter states it was dc'ed in the hospital.  /50 on my exam and 127/66 with home cuff sitting.  120/60 standing; taken by Dr. Locke.  Testing:  Labs 7/30/24: Na 142, K 3.8, Cr 1.08, Ca 9.6, Mag 2.1, AST 17, ALT 11, WBC 6.78, Hgb 10.1, HCT 32.7, plt 229. Apolipoprotein A 129, Apolipoprotein b 94  EKG 7/30/24: SR with 1st degree AV block with PACs at 72 bpm, TN interval 254 ms, QTc 424 ms, Q waves lead III, PRWP, nonspecific ST-T wave abnormalities, PACs, nonspecific ST-T wave abnormalities   Labs 7/29/24: Chol 184, Trigs 132, HDL 42, ,   CXR 7/30/24: No infiltrates or effusions. S/p CABG. Large hiatal hernia.  Echo 7/27/24: EF 60%. Mild DD with elevated filling pressures. Normal RVSF. Aortic sclerosis without stenosis. Mild MR. Mild TR. PASP 47mmHg. No pericardial effusion.  ECG:  February 26, 2024. Normal sinus rhythm. Poor R wave progression.    Remote/Ambulatory Rhythm Monitoring: Event monitor which was done recently showed 1% atrial fibrillation burden. Ventricular rate was stable at under 90 on average.    Echo: January 9, 2020. EF 60 mild mitral regurgitation RVSP 45 May 24, 2022.. LVEF 60% mild mitral and aortic regurgitation April 22, 2024. LVEF 65%. Minimal valvular abnormality.    Carotid/Aorta/Peripheral Vascular: Bilateral carotid Doppler study. May 24, 2022 mild nonobstructive disease  [employer] 2017: Lexiscan. Equivocal by EKG. Normal SPECT. Diaphragmatic artifact.  Abd u/s 2010: No AAA. No prominent or mobile plaque noted.

## 2024-08-13 NOTE — REVIEW OF SYSTEMS
[Feeling Fatigued] : feeling fatigued [SOB] : no shortness of breath [Dyspnea on exertion] : dyspnea during exertion [Chest Discomfort] : chest discomfort [Lower Ext Edema] : no extremity edema [Leg Claudication] : no intermittent leg claudication [Palpitations] : palpitations [Orthopnea] : no orthopnea [PND] : no PND [Syncope] : no syncope [Heartburn] : heartburn [Joint Pain] : joint pain [Joint Swelling] : joint swelling [Joint Stiffness] : joint stiffness [FreeTextEntry8] : Increased urinary frequency without any dysuria, pain, hematuria, fever [de-identified] : Balance abnormality [Negative] : Heme/Lymph [FreeTextEntry5] : see HPI

## 2024-08-13 NOTE — REASON FOR VISIT
[Symptom and Test Evaluation] : symptom and test evaluation [Hyperlipidemia] : hyperlipidemia [Hypertension] : hypertension [Coronary Artery Disease] : coronary artery disease [FreeTextEntry3] : Dr. Keller [FreeTextEntry1] : 65-year-old female comes in for follow-up consultation to review further management of her paroxysmal atrial fibrillation and labile hypertension.  Her main complaint Of fatigue tiredness after taking morning dose of amlodipine which she is taking at 5 mg.  She also had few episodes of left arm and shoulder pain.  Once responding to nitroglycerin.  Usually last for minutes.  Mainly at rest.  No other associated symptoms.  Her blood pressure readings in the morning have been elevated.  Though it does decrease when she feels very fatigued and tired in the afternoon hours.  She has no PND orthopnea or pedal edema.  No palpitation.  No near syncopal syncopal event.  Walks with a cane.

## 2024-08-19 ENCOUNTER — OFFICE (OUTPATIENT)
Dept: URBAN - METROPOLITAN AREA CLINIC 8 | Facility: CLINIC | Age: 89
Setting detail: OPHTHALMOLOGY
End: 2024-08-19

## 2024-08-19 DIAGNOSIS — H90.3: ICD-10-CM

## 2024-08-19 PROCEDURE — 92593 HEARING AID CHECK; BINAURAL: CPT | Performed by: AUDIOLOGIST

## 2024-08-20 ENCOUNTER — OFFICE (OUTPATIENT)
Dept: URBAN - METROPOLITAN AREA CLINIC 8 | Facility: CLINIC | Age: 89
Setting detail: OPHTHALMOLOGY
End: 2024-08-20
Payer: MEDICARE

## 2024-08-20 VITALS — HEIGHT: 55 IN

## 2024-08-20 DIAGNOSIS — H52.4: ICD-10-CM

## 2024-08-20 DIAGNOSIS — H35.363: ICD-10-CM

## 2024-08-20 DIAGNOSIS — H26.492: ICD-10-CM

## 2024-08-20 DIAGNOSIS — H16.223: ICD-10-CM

## 2024-08-20 DIAGNOSIS — H40.1131: ICD-10-CM

## 2024-08-20 PROCEDURE — 92133 CPTRZD OPH DX IMG PST SGM ON: CPT

## 2024-08-20 PROCEDURE — 92012 INTRM OPH EXAM EST PATIENT: CPT

## 2024-08-20 PROCEDURE — 92015 DETERMINE REFRACTIVE STATE: CPT

## 2024-08-20 ASSESSMENT — CONFRONTATIONAL VISUAL FIELD TEST (CVF)
OS_FINDINGS: FULL
OD_FINDINGS: FULL

## 2024-08-27 ENCOUNTER — APPOINTMENT (OUTPATIENT)
Dept: CARDIOLOGY | Facility: CLINIC | Age: 89
End: 2024-08-27
Payer: MEDICARE

## 2024-08-27 VITALS
BODY MASS INDEX: 24.32 KG/M2 | SYSTOLIC BLOOD PRESSURE: 160 MMHG | OXYGEN SATURATION: 91 % | DIASTOLIC BLOOD PRESSURE: 68 MMHG | WEIGHT: 146 LBS | RESPIRATION RATE: 14 BRPM | HEIGHT: 65 IN | HEART RATE: 74 BPM

## 2024-08-27 DIAGNOSIS — I48.0 PAROXYSMAL ATRIAL FIBRILLATION: ICD-10-CM

## 2024-08-27 DIAGNOSIS — I10 ESSENTIAL (PRIMARY) HYPERTENSION: ICD-10-CM

## 2024-08-27 DIAGNOSIS — I25.10 ATHEROSCLEROTIC HEART DISEASE OF NATIVE CORONARY ARTERY W/OUT ANGINA PECTORIS: ICD-10-CM

## 2024-08-27 DIAGNOSIS — Z79.01 LONG TERM (CURRENT) USE OF ANTICOAGULANTS: ICD-10-CM

## 2024-08-27 PROCEDURE — G2211 COMPLEX E/M VISIT ADD ON: CPT

## 2024-08-27 PROCEDURE — 99214 OFFICE O/P EST MOD 30 MIN: CPT

## 2024-08-27 NOTE — REVIEW OF SYSTEMS
[Feeling Fatigued] : feeling fatigued [Dyspnea on exertion] : dyspnea during exertion [Chest Discomfort] : chest discomfort [Palpitations] : palpitations [Heartburn] : heartburn [Joint Pain] : joint pain [Joint Swelling] : joint swelling [Joint Stiffness] : joint stiffness [Negative] : Heme/Lymph [SOB] : no shortness of breath [Lower Ext Edema] : no extremity edema [Leg Claudication] : no intermittent leg claudication [Orthopnea] : no orthopnea [PND] : no PND [Syncope] : no syncope [FreeTextEntry5] : As per HPI [FreeTextEntry8] : Increased urinary frequency without any dysuria, pain, hematuria, fever [de-identified] : Balance abnormality

## 2024-08-27 NOTE — DISCUSSION/SUMMARY
[FreeTextEntry1] : Assessment and Suggestions: 89-year-old female with above medical history and active medical problems as noted below -Syncopal events in the past. Essential hypertension.  Labile.  Higher blood pressure at present.  Recommended slightly increase amlodipine to 5 mg at nighttime.  Hydration.  Continue to follow blood pressure. -HFU syncope 2/2 symptomatic bradycardia vs TIA/CVA. Sx's of fatigue/weakness/dizziness/lightheadedness. Advised to f/u with neuro, Dr. Shah. Referral placed. ILR battery depleted. 2 week live Zio ordered. no events.  If recurrent significant bradycardia arrhythmias she would benefit from either repeat implantable loop recorder or EP study with device management. -Paroxysmal atrial fibrillation. Now on Eliquis 2.5mg BID. Educated patient on pathophysiology of atrial fibrillation and natural progression as well as associated risk of cardioembolic event. Informed her on indications for long term anticoagulation. Currently there is no unusual bleeding on NOAC, continue current dose. Reviewed bleeding precautions extensively. She now has 24-7 care at home.  Coronary artery disease. CABG. Decrease aspirin to 81 mg. Significant GI symptoms off statin therapy as prev noted. Has Nitro PRN, but has not taken it recently.  -hyperlipidemia. Due to continued significant fatigue tiredness gait abnormality muscle like it was decided to stop statin therapy. Understands risk benefits and alternatives. Consider at f/u OV.  -Non-rheumatic mitral and aortic insufficiency. Concentric LVH. mild pulmonary Hypertension. See most recent echo above. Cont. to monitor.  -PVD. Carotid atherosclerosis. Nonobstructive. Continue aspirin.  -Gait and balance abnormality. Safety precaution. Physical therapy. And use of cane.  -Anxiety and stress. Sertraline restarted at 25 mg which helped her in the past   Advised f/u with PCP and neuro. Hospital records given to patient. I have reviewed above at length. I answered all the questions. Patient verbalized understandings. Thank you very much for allowing me to participate in your patient's care. Please feel free to call me for any questions.Sincerely,  Harpal Locke MD, [facility], [place]

## 2024-08-27 NOTE — DISCUSSION/SUMMARY
[FreeTextEntry1] : Assessment and Suggestions: 89-year-old female with above medical history and active medical problems as noted below -Syncopal events in the past. Essential hypertension.  Labile.  Higher blood pressure at present.  Recommended slightly increase amlodipine to 5 mg at nighttime.  Hydration.  Continue to follow blood pressure. -HFU syncope 2/2 symptomatic bradycardia vs TIA/CVA. Sx's of fatigue/weakness/dizziness/lightheadedness. Advised to f/u with neuro, Dr. Shah. Referral placed. ILR battery depleted. 2 week live Zio ordered. no events.  If recurrent significant bradycardia arrhythmias she would benefit from either repeat implantable loop recorder or EP study with device management. -Paroxysmal atrial fibrillation. Now on Eliquis 2.5mg BID. Educated patient on pathophysiology of atrial fibrillation and natural progression as well as associated risk of cardioembolic event. Informed her on indications for long term anticoagulation. Currently there is no unusual bleeding on NOAC, continue current dose. Reviewed bleeding precautions extensively. She now has 24-7 care at home.  Coronary artery disease. CABG. Decrease aspirin to 81 mg. Significant GI symptoms off statin therapy as prev noted. Has Nitro PRN, but has not taken it recently.  -hyperlipidemia. Due to continued significant fatigue tiredness gait abnormality muscle like it was decided to stop statin therapy. Understands risk benefits and alternatives. Consider at f/u OV.  -Non-rheumatic mitral and aortic insufficiency. Concentric LVH. mild pulmonary Hypertension. See most recent echo above. Cont. to monitor.  -PVD. Carotid atherosclerosis. Nonobstructive. Continue aspirin.  -Gait and balance abnormality. Safety precaution. Physical therapy. And use of cane.  -Anxiety and stress. Sertraline restarted at 25 mg which helped her in the past   Advised f/u with PCP and neuro. Hospital records given to patient. I have reviewed above at length. I answered all the questions. Patient verbalized understandings. Thank you very much for allowing me to participate in your patient's care. Please feel free to call me for any questions.Sincerely,  Harpal Locke MD, [facility], [place]

## 2024-08-27 NOTE — CARDIOLOGY SUMMARY
[de-identified] : nsr low voltage complexes poor R wave progression.  June 7, 2020 1/14/2022.  Normal sinus rhythm.  Poor R wave progression. March 29, 2023 normal sinus rhythm poor R wave progression normal intervals June 25, 2024 normal sinus rhythm PVCs February 26, 2024.  Normal sinus rhythm.  Poor R wave progression. [de-identified] : Event monitor which was done recently showed 1% atrial fibrillation burden.  Ventricular rate was stable at under 90 on average. Labs 7/30/24: Na 142, K 3.8, Cr 1.08, Ca 9.6, Mag 2.1, AST 17, ALT 11, WBC 6.78, Hgb 10.1, HCT 32.7, plt 229. Apolipoprotein A 129, Apolipoprotein b 94  EKG 7/30/24: SR with 1st degree AV block with PACs at 72 bpm, [place] interval 254 ms, QTc 424 ms, Q waves lead III, PRWP, nonspecific ST-T wave abnormalities, PACs, nonspecific ST-T wave abnormalities  Labs 7/29/24: Chol 184, Trigs 132, HDL 42, ,  CXR 7/30/24: No infiltrates or effusions. S/p CABG. Large hiatal hernia.  Echo 7/27/24: EF 60%. Mild DD with elevated filling pressures. Normal RVSF. Aortic sclerosis without stenosis. Mild MR. Mild TR. PASP 47mmHg. No pericardial effusion.  [de-identified] : January 9, 2020.  EF 60 mild mitral regurgitation RVSP 45 May 24, 2022..  LVEF 60% mild mitral and aortic regurgitation April 22, 2024.  LVEF 65%.  Minimal valvular abnormality. [de-identified] : Bilateral carotid Doppler study.  May 24, 2022 mild nonobstructive disease [de-identified] : Labs 7/30/24: Na 142, K 3.8, Cr 1.08, Ca 9.6, Mag 2.1, AST 17, ALT 11, WBC 6.78, Hgb 10.1, HCT 32.7, plt 229. Apolipoprotein A 129, Apolipoprotein b 94  EKG 7/30/24: SR with 1st degree AV block with PACs at 72 bpm, [place] interval 254 ms, QTc 424 ms, Q waves lead III, PRWP, nonspecific ST-T wave abnormalities, PACs, nonspecific ST-T wave abnormalities  Labs 7/29/24: Chol 184, Trigs 132, HDL 42, ,  CXR 7/30/24: No infiltrates or effusions. S/p CABG. Large hiatal hernia.  Echo 7/27/24: EF 60%. Mild DD with elevated filling pressures. Normal RVSF. Aortic sclerosis without stenosis. Mild MR. Mild TR. PASP 47mmHg. No pericardial effusion.

## 2024-08-27 NOTE — HISTORY OF PRESENT ILLNESS
[FreeTextEntry1] : 89-year-old is here with her daughter.  Since last episode of hospital admission according to her she is still continue to feel fatigued and tired.  But has not had any dizziness lightheadedness or near syncopal event.  Her blood pressure at home is remaining elevated.  She has no chest pain.  There is no PND orthopnea or pedal edema.  No bleeding complications.  PMH as noted below: Coronary artery disease status post CABG, which includes LIMA to LAD. 4/2010 [facility]/[place].  Nuclear stress test from 10/17 with no significant perfusion defect.   Essential hypertension:  Well controlled.   History of having gastric ulcer with a GI bleed in December.  s/p transfusions. GI evaluation for PUD, followed with Dr. Ghosh in the past.  Dyslipidemia. Remaining on statin.  Moderate mitral regurgitation. Remaining stable.  Mild pulmonary hypertension.  No CHF  History of depression. improved.  History of syncopal episode.  Implantable loop recorder. No significant findings.  Bilateral moderate carotid stenosis.  Per past note; No significant change when evaluated in April 2015.  Remaining without neurological events

## 2024-08-27 NOTE — CARDIOLOGY SUMMARY
[de-identified] : nsr low voltage complexes poor R wave progression.  June 7, 2020 1/14/2022.  Normal sinus rhythm.  Poor R wave progression. March 29, 2023 normal sinus rhythm poor R wave progression normal intervals June 25, 2024 normal sinus rhythm PVCs February 26, 2024.  Normal sinus rhythm.  Poor R wave progression. [de-identified] : Event monitor which was done recently showed 1% atrial fibrillation burden.  Ventricular rate was stable at under 90 on average. Labs 7/30/24: Na 142, K 3.8, Cr 1.08, Ca 9.6, Mag 2.1, AST 17, ALT 11, WBC 6.78, Hgb 10.1, HCT 32.7, plt 229. Apolipoprotein A 129, Apolipoprotein b 94  EKG 7/30/24: SR with 1st degree AV block with PACs at 72 bpm, PA interval 254 ms, QTc 424 ms, Q waves lead III, PRWP, nonspecific ST-T wave abnormalities, PACs, nonspecific ST-T wave abnormalities  Labs 7/29/24: Chol 184, Trigs 132, HDL 42, ,  CXR 7/30/24: No infiltrates or effusions. S/p CABG. Large hiatal hernia.  Echo 7/27/24: EF 60%. Mild DD with elevated filling pressures. Normal RVSF. Aortic sclerosis without stenosis. Mild MR. Mild TR. PASP 47mmHg. No pericardial effusion.  [de-identified] : January 9, 2020.  EF 60 mild mitral regurgitation RVSP 45 May 24, 2022..  LVEF 60% mild mitral and aortic regurgitation April 22, 2024.  LVEF 65%.  Minimal valvular abnormality. [de-identified] : Bilateral carotid Doppler study.  May 24, 2022 mild nonobstructive disease [de-identified] : Labs 7/30/24: Na 142, K 3.8, Cr 1.08, Ca 9.6, Mag 2.1, AST 17, ALT 11, WBC 6.78, Hgb 10.1, HCT 32.7, plt 229. Apolipoprotein A 129, Apolipoprotein b 94  EKG 7/30/24: SR with 1st degree AV block with PACs at 72 bpm, [place] interval 254 ms, QTc 424 ms, Q waves lead III, PRWP, nonspecific ST-T wave abnormalities, PACs, nonspecific ST-T wave abnormalities  Labs 7/29/24: Chol 184, Trigs 132, HDL 42, ,  CXR 7/30/24: No infiltrates or effusions. S/p CABG. Large hiatal hernia.  Echo 7/27/24: EF 60%. Mild DD with elevated filling pressures. Normal RVSF. Aortic sclerosis without stenosis. Mild MR. Mild TR. PASP 47mmHg. No pericardial effusion.

## 2024-08-27 NOTE — PHYSICAL EXAM
[Well Developed] : well developed [Normal S1, S2] : normal S1, S2 [Murmur] : murmur [Clear Lung Fields] : clear lung fields [Normal Gait] : normal gait [No Edema] : no edema [Normal Radial B/L] : normal radial B/L [Normal DP B/L] : normal DP B/L [Normal Speech] : normal speech [Alert and Oriented] : alert and oriented

## 2024-08-27 NOTE — REVIEW OF SYSTEMS
[Feeling Fatigued] : feeling fatigued [Dyspnea on exertion] : dyspnea during exertion [Chest Discomfort] : chest discomfort [Palpitations] : palpitations [Heartburn] : heartburn [Joint Pain] : joint pain [Joint Swelling] : joint swelling [Joint Stiffness] : joint stiffness [Negative] : Heme/Lymph [SOB] : no shortness of breath [Lower Ext Edema] : no extremity edema [Leg Claudication] : no intermittent leg claudication [Orthopnea] : no orthopnea [PND] : no PND [Syncope] : no syncope [FreeTextEntry5] : As per HPI [FreeTextEntry8] : Increased urinary frequency without any dysuria, pain, hematuria, fever [de-identified] : Balance abnormality

## 2024-08-27 NOTE — ASSESSMENT
[FreeTextEntry1] : February 26, 2024. EKG from today reviewed Last labs available are from 2023.  Reviewed April 2, 2024. Recent labs and event monitor were reviewed.  Blood pressure was recorded multiple times.  Home blood pressure readings were reviewed.  Reviewed on April 22, 2024.  Echocardiogram from today reviewed.  Reviewed June 25, 2024.  EKG as noted above Labs from [place] 3, 2024 mild normocytic anemia hemoglobin 11.9 sodium 138 potassium 4.5 creatinine 0.95 LFT normal.  Total cholesterol 212 triglycerides 233 HDL 41  TSH was 1.62.

## 2024-08-27 NOTE — HISTORY OF PRESENT ILLNESS
[FreeTextEntry1] : 89-year-old is here with her daughter.  Since last episode of hospital admission according to her she is still continue to feel fatigued and tired.  But has not had any dizziness lightheadedness or near syncopal event.  Her blood pressure at home is remaining elevated.  She has no chest pain.  There is no PND orthopnea or pedal edema.  No bleeding complications.  PMH as noted below: Coronary artery disease status post CABG, which includes LIMA to LAD. 4/2010 [facility]/[place].  Nuclear stress test from 10/17 with no significant perfusion defect.   Essential hypertension:  Well controlled.   History of having gastric ulcer with a GI bleed in December.  s/p transfusions. GI evaluation for PUD, followed with Dr. Ghosh in the past.  Dyslipidemia. Remaining on statin.  Moderate mitral regurgitation. Remaining stable.  Mild pulmonary hypertension.  No CHF  History of depression. improved.  History of syncopal episode.  Implantable loop recorder. No significant findings.  Bilateral moderate carotid stenosis.  Per past note; No significant change when evaluated in April 2015.  Remaining without neurological events

## 2024-09-05 ENCOUNTER — NON-APPOINTMENT (OUTPATIENT)
Age: 89
End: 2024-09-05

## 2024-10-01 ENCOUNTER — APPOINTMENT (OUTPATIENT)
Dept: CARDIOLOGY | Facility: CLINIC | Age: 89
End: 2024-10-01
Payer: MEDICARE

## 2024-10-01 VITALS
DIASTOLIC BLOOD PRESSURE: 54 MMHG | OXYGEN SATURATION: 93 % | WEIGHT: 145 LBS | BODY MASS INDEX: 24.16 KG/M2 | SYSTOLIC BLOOD PRESSURE: 100 MMHG | HEART RATE: 69 BPM | HEIGHT: 65 IN

## 2024-10-01 DIAGNOSIS — I27.20 PULMONARY HYPERTENSION, UNSPECIFIED: ICD-10-CM

## 2024-10-01 DIAGNOSIS — R60.0 LOCALIZED EDEMA: ICD-10-CM

## 2024-10-01 DIAGNOSIS — I10 ESSENTIAL (PRIMARY) HYPERTENSION: ICD-10-CM

## 2024-10-01 DIAGNOSIS — Z79.01 LONG TERM (CURRENT) USE OF ANTICOAGULANTS: ICD-10-CM

## 2024-10-01 DIAGNOSIS — E78.5 HYPERLIPIDEMIA, UNSPECIFIED: ICD-10-CM

## 2024-10-01 DIAGNOSIS — I48.0 PAROXYSMAL ATRIAL FIBRILLATION: ICD-10-CM

## 2024-10-01 DIAGNOSIS — I25.10 ATHEROSCLEROTIC HEART DISEASE OF NATIVE CORONARY ARTERY W/OUT ANGINA PECTORIS: ICD-10-CM

## 2024-10-01 PROCEDURE — G2211 COMPLEX E/M VISIT ADD ON: CPT

## 2024-10-01 PROCEDURE — 99214 OFFICE O/P EST MOD 30 MIN: CPT

## 2024-10-01 RX ORDER — SERTRALINE 25 MG/1
25 TABLET, FILM COATED ORAL DAILY
Qty: 90 | Refills: 3 | Status: ACTIVE | COMMUNITY
Start: 2024-10-01

## 2024-10-01 RX ORDER — NIACIN 100 MG
TABLET ORAL
Refills: 0 | Status: DISCONTINUED | COMMUNITY

## 2024-10-01 NOTE — REVIEW OF SYSTEMS
[Feeling Fatigued] : feeling fatigued [Dyspnea on exertion] : dyspnea during exertion [Chest Discomfort] : chest discomfort [Palpitations] : palpitations [Heartburn] : heartburn [Joint Pain] : joint pain [Joint Swelling] : joint swelling [Joint Stiffness] : joint stiffness [Negative] : Heme/Lymph [SOB] : no shortness of breath [Lower Ext Edema] : no extremity edema [Leg Claudication] : no intermittent leg claudication [Orthopnea] : no orthopnea [PND] : no PND [Syncope] : no syncope [FreeTextEntry5] : As per HPI [FreeTextEntry7] : as per hpi  [FreeTextEntry8] : Increased urinary frequency without any dysuria, pain, hematuria, fever [de-identified] : Balance abnormality

## 2024-10-01 NOTE — PHYSICAL EXAM
[Well Developed] : well developed [Normal Venous Pressure] : normal venous pressure [Normal S1, S2] : normal S1, S2 [Murmur] : murmur [Clear Lung Fields] : clear lung fields [No Edema] : no edema [Normal Speech] : normal speech [Alert and Oriented] : alert and oriented

## 2024-10-01 NOTE — DISCUSSION/SUMMARY
[FreeTextEntry1] : Assessment and Suggestions: 89-year-old female with above medical history and active medical problems as noted below -Syncopal events in the past. Essential hypertension.  Labile.  stable blood pressure at present.   Hydration.  Continue to follow blood pressure.  Continue present regimen of medications. -HFU syncope 2/2 symptomatic bradycardia vs TIA/CVA. Sx's of fatigue/weakness/dizziness/lightheadedness. ILR battery depleted. 2 week live Zio ordered. no events.  If recurrent significant bradycardia arrhythmias she would benefit from either repeat implantable loop recorder or EP study with device management. -Paroxysmal atrial fibrillation. Now on Eliquis 2.5mg BID. Educated patient on pathophysiology of atrial fibrillation and natural progression as well as associated risk of cardioembolic event. Informed her on indications for long term anticoagulation. Currently there is no unusual bleeding on NOAC, continue current dose. Reviewed bleeding precautions extensively. She now has 24-7 care at home.  Coronary artery disease. CABG. considering long-term use of NOAC.  History of GI bleed and anemia.  Will stop aspirin.  Significant GI symptoms off statin therapy as prev noted. Has Nitro PRN, but has not taken it recently.  -hyperlipidemia. Due to continued significant fatigue tiredness gait abnormality muscle like it was decided to stop statin therapy. Understands risk benefits and alternatives. Consider at f/u OV.  -Non-rheumatic mitral and aortic insufficiency. Concentric LVH. mild pulmonary Hypertension. See most recent echo above. Cont. to monitor.  -PVD. Carotid atherosclerosis. Nonobstructive. Continue aspirin.  -Gait and balance abnormality. Safety precaution. Physical therapy. And use of cane.  -Anxiety and stress. Sertraline restarted at 25 mg which helped her in the past.  She was started on 12.5 mg and increase as tolerated.  If there is worsening nausea she will contact us.   Advised f/u with PCP I have reviewed above at length. I answered all the questions. Patient verbalized understandings. Thank you very much for allowing me to participate in your patient's care. Please feel free to call me for any questions.Sincerely,  Harpal Locke MD, [facility], [place]

## 2024-10-01 NOTE — ASSESSMENT
[FreeTextEntry1] : February 26, 2024. EKG from today reviewed Last labs available are from 2023.  Reviewed April 2, 2024. Recent labs and event monitor were reviewed.  Blood pressure was recorded multiple times.  Home blood pressure readings were reviewed.  Reviewed on April 22, 2024.  Echocardiogram from today reviewed.  Reviewed June 25, 2024.  EKG as noted above Labs from [place] 3, 2024 mild normocytic anemia hemoglobin 11.9 sodium 138 potassium 4.5 creatinine 0.95 LFT normal.  Total cholesterol 212 triglycerides 233 HDL 41  TSH was 1.62.  Reviewed on October 1, 2024. Recent labs reviewed.  July 30, 2024.

## 2024-10-01 NOTE — CARDIOLOGY SUMMARY
[de-identified] : nsr low voltage complexes poor R wave progression.  June 7, 2020 1/14/2022.  Normal sinus rhythm.  Poor R wave progression. March 29, 2023 normal sinus rhythm poor R wave progression normal intervals June 25, 2024 normal sinus rhythm PVCs February 26, 2024.  Normal sinus rhythm.  Poor R wave progression. [de-identified] : Event monitor which was done recently showed 1% atrial fibrillation burden.  Ventricular rate was stable at under 90 on average. Labs 7/30/24: Na 142, K 3.8, Cr 1.08, Ca 9.6, Mag 2.1, AST 17, ALT 11, WBC 6.78, Hgb 10.1, HCT 32.7, plt 229. Apolipoprotein A 129, Apolipoprotein b 94  EKG 7/30/24: SR with 1st degree AV block with PACs at 72 bpm, [place] interval 254 ms, QTc 424 ms, Q waves lead III, PRWP, nonspecific ST-T wave abnormalities, PACs, nonspecific ST-T wave abnormalities  Labs 7/29/24: Chol 184, Trigs 132, HDL 42, ,  CXR 7/30/24: No infiltrates or effusions. S/p CABG. Large hiatal hernia.  Echo 7/27/24: EF 60%. Mild DD with elevated filling pressures. Normal RVSF. Aortic sclerosis without stenosis. Mild MR. Mild TR. PASP 47mmHg. No pericardial effusion.  [de-identified] : January 9, 2020.  EF 60 mild mitral regurgitation RVSP 45 May 24, 2022..  LVEF 60% mild mitral and aortic regurgitation April 22, 2024.  LVEF 65%.  Minimal valvular abnormality. [de-identified] : Bilateral carotid Doppler study.  May 24, 2022 mild nonobstructive disease [de-identified] : Labs 7/30/24: Na 142, K 3.8, Cr 1.08, Ca 9.6, Mag 2.1, AST 17, ALT 11, WBC 6.78, Hgb 10.1, HCT 32.7, plt 229. Apolipoprotein A 129, Apolipoprotein b 94  EKG 7/30/24: SR with 1st degree AV block with PACs at 72 bpm, [place] interval 254 ms, QTc 424 ms, Q waves lead III, PRWP, nonspecific ST-T wave abnormalities, PACs, nonspecific ST-T wave abnormalities  Labs 7/29/24: Chol 184, Trigs 132, HDL 42, ,  CXR 7/30/24: No infiltrates or effusions. S/p CABG. Large hiatal hernia.  Echo 7/27/24: EF 60%. Mild DD with elevated filling pressures. Normal RVSF. Aortic sclerosis without stenosis. Mild MR. Mild TR. PASP 47mmHg. No pericardial effusion.

## 2024-10-01 NOTE — HISTORY OF PRESENT ILLNESS
[FreeTextEntry1] : 89-year-old is here with her daughter.   she has not had any dizziness lightheadedness or near syncopal event.  Her blood pressure at home is remaining elevated.  She has no chest pain.  There is no PND orthopnea or pedal edema.  No bleeding complications.  According to her she stopped taking her sertraline.  Slightly more anxious.  She did that thinking it may be causing her giving her nausea.  She still has nausea may be slightly better.  PMH as noted below: Coronary artery disease status post CABG, which includes LIMA to LAD. 4/2010 [facility]/[place].  Nuclear stress test from 10/17 with no significant perfusion defect.   Essential hypertension:  Well controlled.   History of having gastric ulcer with a GI bleed in December.  s/p transfusions. GI evaluation for PUD, followed with Dr. Ghosh in the past.  Dyslipidemia. Remaining on statin.  Moderate mitral regurgitation. Remaining stable.  Mild pulmonary hypertension.  No CHF  History of depression. improved.  History of syncopal episode.  Implantable loop recorder. No significant findings.  Bilateral moderate carotid stenosis.  Per past note; No significant change when evaluated in April 2015.  Remaining without neurological events

## 2024-10-17 ENCOUNTER — NON-APPOINTMENT (OUTPATIENT)
Age: 89
End: 2024-10-17

## 2024-11-19 ENCOUNTER — OFFICE (OUTPATIENT)
Dept: URBAN - METROPOLITAN AREA CLINIC 8 | Facility: CLINIC | Age: 89
Setting detail: OPHTHALMOLOGY
End: 2024-11-19
Payer: MEDICARE

## 2024-11-19 DIAGNOSIS — H35.363: ICD-10-CM

## 2024-11-19 DIAGNOSIS — H40.1131: ICD-10-CM

## 2024-11-19 PROCEDURE — 99213 OFFICE O/P EST LOW 20 MIN: CPT

## 2024-11-19 PROCEDURE — 92134 CPTRZ OPH DX IMG PST SGM RTA: CPT

## 2024-11-19 ASSESSMENT — KERATOMETRY
OS_K1POWER_DIOPTERS: 42.50
OD_K2POWER_DIOPTERS: 45.00
OD_K1POWER_DIOPTERS: 43.00
METHOD_AUTO_MANUAL: AUTO
OS_AXISANGLE_DEGREES: 013
OS_K2POWER_DIOPTERS: 44.50
OD_AXISANGLE_DEGREES: 001

## 2024-11-19 ASSESSMENT — REFRACTION_MANIFEST
OD_AXIS: 090
OU_VA: 20/20-2
OS_SPHERE: -1.25
OD_CYLINDER: -0.75
OD_CYLINDER: -0.25
OD_SPHERE: +3.50
OD_VA2: 20/30(J2)
OS_CYLINDER: -1.25
OS_VA1: 20/25-2
OD_SPHERE: +0.25
OD_VA2: 20/20(J1+)
OD_ADD: +3.25
OS_AXIS: 080
OS_CYLINDER: -1.25
OD_AXIS: 090
OS_VA2: 20/30(J2)
OD_SPHERE: +0.25
OD_VA1: 20/20-2
OS_ADD: +3.25
OU_VA: 20/20-2
OS_ADD: +3.00
OS_VA2: 20/20(J1+)
OD_CYLINDER: -0.25
OS_SPHERE: -1.00
OD_VA1: 20/25-2
OD_AXIS: 85
OS_AXIS: 90
OS_VA1: 20/30
OS_AXIS: 080
OS_SPHERE: +2.25
OS_CYLINDER: -1.25
OD_ADD: +3.00

## 2024-11-19 ASSESSMENT — REFRACTION_CURRENTRX
OD_SPHERE: +0.75
OS_OVR_VA: 20/
OS_AXIS: 082
OS_CYLINDER: -0.50
OD_SPHERE: +2.50
OD_CYLINDER: -0.75
OS_OVR_VA: 20/
OS_VPRISM_DIRECTION: SV
OD_VPRISM_DIRECTION: SV
OS_SPHERE: -0.50
OS_VPRISM_DIRECTION: SV
OD_CYLINDER: SPH
OS_SPHERE: +1.50
OD_OVR_VA: 20/
OS_CYLINDER: -0.75
OD_AXIS: 084
OS_AXIS: 094
OD_VPRISM_DIRECTION: SV
OD_OVR_VA: 20/

## 2024-11-19 ASSESSMENT — SUPERFICIAL PUNCTATE KERATITIS (SPK)
OD_SPK: 1+ 2+
OS_SPK: 1+ 2+

## 2024-11-19 ASSESSMENT — PACHYMETRY
OS_CT_CORRECTION: 3
OD_CT_UM: 490
OD_CT_CORRECTION: 4
OS_CT_UM: 500

## 2024-11-19 ASSESSMENT — REFRACTION_AUTOREFRACTION
OS_AXIS: 92
OD_SPHERE: +0.50
OD_AXIS: 083
OS_CYLINDER: -1.25
OS_SPHERE: -1.00
OD_CYLINDER: -0.75

## 2024-11-19 ASSESSMENT — TONOMETRY
OD_IOP_MMHG: 20
OS_IOP_MMHG: 14

## 2024-11-19 ASSESSMENT — CONFRONTATIONAL VISUAL FIELD TEST (CVF)
OD_FINDINGS: FULL
OS_FINDINGS: FULL

## 2024-11-19 ASSESSMENT — VISUAL ACUITY
OS_BCVA: 20/30
OD_BCVA: 20/200

## 2024-12-04 ENCOUNTER — APPOINTMENT (OUTPATIENT)
Dept: CARDIOLOGY | Facility: CLINIC | Age: 88
End: 2024-12-04
Payer: MEDICARE

## 2024-12-04 VITALS — OXYGEN SATURATION: 92 %

## 2024-12-04 VITALS
BODY MASS INDEX: 24.3 KG/M2 | WEIGHT: 146 LBS | HEART RATE: 65 BPM | DIASTOLIC BLOOD PRESSURE: 50 MMHG | SYSTOLIC BLOOD PRESSURE: 118 MMHG

## 2024-12-04 DIAGNOSIS — E78.5 HYPERLIPIDEMIA, UNSPECIFIED: ICD-10-CM

## 2024-12-04 DIAGNOSIS — Z79.01 LONG TERM (CURRENT) USE OF ANTICOAGULANTS: ICD-10-CM

## 2024-12-04 DIAGNOSIS — Z86.73 PERSONAL HISTORY OF TRANSIENT ISCHEMIC ATTACK (TIA), AND CEREBRAL INFARCTION W/OUT RESIDUAL DEFICITS: ICD-10-CM

## 2024-12-04 DIAGNOSIS — I10 ESSENTIAL (PRIMARY) HYPERTENSION: ICD-10-CM

## 2024-12-04 DIAGNOSIS — I25.10 ATHEROSCLEROTIC HEART DISEASE OF NATIVE CORONARY ARTERY W/OUT ANGINA PECTORIS: ICD-10-CM

## 2024-12-04 DIAGNOSIS — R55 SYNCOPE AND COLLAPSE: ICD-10-CM

## 2024-12-04 DIAGNOSIS — I48.0 PAROXYSMAL ATRIAL FIBRILLATION: ICD-10-CM

## 2024-12-04 PROCEDURE — 99215 OFFICE O/P EST HI 40 MIN: CPT

## 2024-12-04 PROCEDURE — G2211 COMPLEX E/M VISIT ADD ON: CPT

## 2025-01-07 ENCOUNTER — OFFICE (OUTPATIENT)
Dept: URBAN - METROPOLITAN AREA CLINIC 8 | Facility: CLINIC | Age: OVER 89
Setting detail: OPHTHALMOLOGY
End: 2025-01-07
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
## 2025-01-07 PROCEDURE — NO SHOW FE NO SHOW FEE

## 2025-01-08 ENCOUNTER — APPOINTMENT (OUTPATIENT)
Dept: CARDIOLOGY | Facility: CLINIC | Age: 89
End: 2025-01-08

## 2025-01-14 ENCOUNTER — APPOINTMENT (OUTPATIENT)
Dept: CARDIOLOGY | Facility: CLINIC | Age: 89
End: 2025-01-14
Payer: MEDICARE

## 2025-01-14 ENCOUNTER — NON-APPOINTMENT (OUTPATIENT)
Age: 89
End: 2025-01-14

## 2025-01-14 VITALS
OXYGEN SATURATION: 93 % | HEART RATE: 71 BPM | DIASTOLIC BLOOD PRESSURE: 64 MMHG | WEIGHT: 145 LBS | SYSTOLIC BLOOD PRESSURE: 110 MMHG | BODY MASS INDEX: 24.16 KG/M2 | HEIGHT: 65 IN

## 2025-01-14 DIAGNOSIS — Z79.01 LONG TERM (CURRENT) USE OF ANTICOAGULANTS: ICD-10-CM

## 2025-01-14 DIAGNOSIS — E78.5 HYPERLIPIDEMIA, UNSPECIFIED: ICD-10-CM

## 2025-01-14 DIAGNOSIS — I48.0 PAROXYSMAL ATRIAL FIBRILLATION: ICD-10-CM

## 2025-01-14 DIAGNOSIS — I95.1 ORTHOSTATIC HYPOTENSION: ICD-10-CM

## 2025-01-14 DIAGNOSIS — Z95.0 PRESENCE OF CARDIAC PACEMAKER: ICD-10-CM

## 2025-01-14 DIAGNOSIS — I49.5 SICK SINUS SYNDROME: ICD-10-CM

## 2025-01-14 DIAGNOSIS — I10 ESSENTIAL (PRIMARY) HYPERTENSION: ICD-10-CM

## 2025-01-14 PROCEDURE — 93280 PM DEVICE PROGR EVAL DUAL: CPT

## 2025-01-14 PROCEDURE — 99215 OFFICE O/P EST HI 40 MIN: CPT

## 2025-01-14 PROCEDURE — G2211 COMPLEX E/M VISIT ADD ON: CPT

## 2025-01-14 RX ORDER — MIDODRINE HYDROCHLORIDE 2.5 MG/1
2.5 TABLET ORAL DAILY
Qty: 30 | Refills: 0 | Status: ACTIVE | COMMUNITY
Start: 2025-01-14 | End: 1900-01-01

## 2025-01-27 ENCOUNTER — OFFICE (OUTPATIENT)
Dept: URBAN - METROPOLITAN AREA CLINIC 8 | Facility: CLINIC | Age: OVER 89
Setting detail: OPHTHALMOLOGY
End: 2025-01-27

## 2025-01-27 DIAGNOSIS — H90.3: ICD-10-CM

## 2025-01-27 PROCEDURE — NO SHOW FE NO SHOW FEE: Performed by: AUDIOLOGIST

## 2025-01-30 ENCOUNTER — OFFICE (OUTPATIENT)
Dept: URBAN - METROPOLITAN AREA CLINIC 97 | Facility: CLINIC | Age: OVER 89
Setting detail: OPHTHALMOLOGY
End: 2025-01-30
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
## 2025-01-30 PROCEDURE — NO SHOW FE NO SHOW FEE

## 2025-02-11 ENCOUNTER — OFFICE (OUTPATIENT)
Dept: URBAN - METROPOLITAN AREA CLINIC 8 | Facility: CLINIC | Age: OVER 89
Setting detail: OPHTHALMOLOGY
End: 2025-02-11

## 2025-02-11 DIAGNOSIS — H90.3: ICD-10-CM

## 2025-02-11 PROCEDURE — 92593 HEARING AID CHECK; BINAURAL: CPT | Performed by: AUDIOLOGIST

## 2025-02-17 ENCOUNTER — OFFICE (OUTPATIENT)
Dept: URBAN - METROPOLITAN AREA CLINIC 8 | Facility: CLINIC | Age: OVER 89
Setting detail: OPHTHALMOLOGY
End: 2025-02-17

## 2025-02-17 DIAGNOSIS — H90.3: ICD-10-CM

## 2025-02-17 PROCEDURE — 92593 HEARING AID CHECK; BINAURAL: CPT | Performed by: AUDIOLOGIST

## 2025-03-27 ENCOUNTER — OFFICE (OUTPATIENT)
Dept: URBAN - METROPOLITAN AREA CLINIC 38 | Facility: CLINIC | Age: OVER 89
Setting detail: OPHTHALMOLOGY
End: 2025-03-27
Payer: MEDICARE

## 2025-03-27 DIAGNOSIS — H11.133: ICD-10-CM

## 2025-03-27 DIAGNOSIS — H40.1131: ICD-10-CM

## 2025-03-27 PROCEDURE — 99213 OFFICE O/P EST LOW 20 MIN: CPT | Performed by: OPHTHALMOLOGY

## 2025-03-27 ASSESSMENT — REFRACTION_CURRENTRX
OD_OVR_VA: 20/
OS_OVR_VA: 20/
OD_CYLINDER: -0.75
OD_SPHERE: +0.75
OD_VPRISM_DIRECTION: SV
OD_VPRISM_DIRECTION: SV
OD_CYLINDER: SPH
OD_AXIS: 084
OS_AXIS: 082
OS_OVR_VA: 20/
OS_AXIS: 094
OS_VPRISM_DIRECTION: SV
OS_CYLINDER: -0.75
OD_OVR_VA: 20/
OD_SPHERE: +2.50
OS_SPHERE: -0.50
OS_CYLINDER: -0.50
OS_SPHERE: +1.50
OS_VPRISM_DIRECTION: SV

## 2025-03-27 ASSESSMENT — SUPERFICIAL PUNCTATE KERATITIS (SPK)
OS_SPK: 1+ 2+
OD_SPK: 1+ 2+

## 2025-03-27 ASSESSMENT — REFRACTION_MANIFEST
OS_VA1: 20/30
OS_SPHERE: -1.25
OD_AXIS: 090
OD_AXIS: 85
OD_VA1: 20/25-2
OS_VA2: 20/30(J2)
OS_SPHERE: +2.25
OS_CYLINDER: -1.25
OD_ADD: +3.25
OS_ADD: +3.00
OD_AXIS: 090
OS_SPHERE: -1.00
OS_VA1: 20/25-2
OU_VA: 20/20-2
OD_CYLINDER: -0.25
OD_SPHERE: +0.25
OS_VA2: 20/20(J1+)
OD_CYLINDER: -0.75
OD_VA2: 20/20(J1+)
OD_SPHERE: +3.50
OS_CYLINDER: -1.25
OD_VA1: 20/20-2
OD_ADD: +3.00
OS_ADD: +3.25
OS_CYLINDER: -1.25
OS_AXIS: 080
OD_VA2: 20/30(J2)
OD_CYLINDER: -0.25
OS_AXIS: 90
OU_VA: 20/20-2
OS_AXIS: 080
OD_SPHERE: +0.25

## 2025-03-27 ASSESSMENT — PACHYMETRY
OD_CT_CORRECTION: 4
OS_CT_UM: 500
OD_CT_UM: 490
OS_CT_CORRECTION: 3

## 2025-03-27 ASSESSMENT — REFRACTION_AUTOREFRACTION
OS_AXIS: 92
OD_CYLINDER: -0.75
OD_SPHERE: +0.50
OS_SPHERE: -1.00
OS_CYLINDER: -1.25
OD_AXIS: 083

## 2025-03-27 ASSESSMENT — CONFRONTATIONAL VISUAL FIELD TEST (CVF)
OD_FINDINGS: FULL
OS_FINDINGS: FULL

## 2025-03-27 ASSESSMENT — KERATOMETRY
OD_K2POWER_DIOPTERS: 45.00
OD_AXISANGLE_DEGREES: 001
OD_K1POWER_DIOPTERS: 43.00
OS_K2POWER_DIOPTERS: 44.50
METHOD_AUTO_MANUAL: AUTO
OS_AXISANGLE_DEGREES: 013
OS_K1POWER_DIOPTERS: 42.50

## 2025-03-27 ASSESSMENT — TONOMETRY
OS_IOP_MMHG: 11
OD_IOP_MMHG: 11

## 2025-03-27 ASSESSMENT — VISUAL ACUITY
OD_BCVA: 20/200
OS_BCVA: 20/30

## 2025-04-29 ENCOUNTER — APPOINTMENT (OUTPATIENT)
Dept: CARDIOLOGY | Facility: CLINIC | Age: 89
End: 2025-04-29
Payer: MEDICARE

## 2025-04-29 VITALS
SYSTOLIC BLOOD PRESSURE: 120 MMHG | BODY MASS INDEX: 23.66 KG/M2 | HEIGHT: 65 IN | OXYGEN SATURATION: 93 % | DIASTOLIC BLOOD PRESSURE: 54 MMHG | HEART RATE: 76 BPM | WEIGHT: 142 LBS

## 2025-04-29 DIAGNOSIS — I48.0 PAROXYSMAL ATRIAL FIBRILLATION: ICD-10-CM

## 2025-04-29 DIAGNOSIS — Z95.0 PRESENCE OF CARDIAC PACEMAKER: ICD-10-CM

## 2025-04-29 PROCEDURE — 93280 PM DEVICE PROGR EVAL DUAL: CPT

## 2025-04-29 PROCEDURE — 99214 OFFICE O/P EST MOD 30 MIN: CPT

## 2025-04-29 PROCEDURE — G2211 COMPLEX E/M VISIT ADD ON: CPT

## 2025-05-07 ENCOUNTER — OFFICE (OUTPATIENT)
Dept: URBAN - METROPOLITAN AREA CLINIC 8 | Facility: CLINIC | Age: OVER 89
Setting detail: OPHTHALMOLOGY
End: 2025-05-07

## 2025-05-07 DIAGNOSIS — H90.3: ICD-10-CM

## 2025-05-07 PROCEDURE — 92593 HEARING AID CHECK; BINAURAL: CPT | Performed by: AUDIOLOGIST

## 2025-06-16 ENCOUNTER — RX RENEWAL (OUTPATIENT)
Age: 89
End: 2025-06-16

## 2025-06-26 ENCOUNTER — APPOINTMENT (OUTPATIENT)
Dept: MAMMOGRAPHY | Facility: CLINIC | Age: 89
End: 2025-06-26

## 2025-06-26 ENCOUNTER — APPOINTMENT (OUTPATIENT)
Dept: ULTRASOUND IMAGING | Facility: CLINIC | Age: 89
End: 2025-06-26
Payer: MEDICARE

## 2025-06-26 PROCEDURE — 77066 DX MAMMO INCL CAD BI: CPT

## 2025-06-26 PROCEDURE — 76641 ULTRASOUND BREAST COMPLETE: CPT | Mod: 50,GA

## 2025-06-26 PROCEDURE — G0279: CPT

## 2025-07-10 ENCOUNTER — APPOINTMENT (OUTPATIENT)
Dept: ULTRASOUND IMAGING | Facility: CLINIC | Age: 89
End: 2025-07-10
Payer: MEDICARE

## 2025-07-10 ENCOUNTER — NON-APPOINTMENT (OUTPATIENT)
Age: 89
End: 2025-07-10

## 2025-07-10 PROCEDURE — 19083 BX BREAST 1ST LESION US IMAG: CPT | Mod: RT

## 2025-07-10 PROCEDURE — 77065 DX MAMMO INCL CAD UNI: CPT | Mod: RT

## 2025-07-14 ENCOUNTER — NON-APPOINTMENT (OUTPATIENT)
Age: 89
End: 2025-07-14

## 2025-07-15 ENCOUNTER — NON-APPOINTMENT (OUTPATIENT)
Age: 89
End: 2025-07-15

## 2025-07-16 ENCOUNTER — NON-APPOINTMENT (OUTPATIENT)
Age: 89
End: 2025-07-16

## 2025-07-17 ENCOUNTER — NON-APPOINTMENT (OUTPATIENT)
Age: 89
End: 2025-07-17

## 2025-07-18 ENCOUNTER — APPOINTMENT (OUTPATIENT)
Dept: BREAST CENTER | Facility: CLINIC | Age: 89
End: 2025-07-18
Payer: MEDICARE

## 2025-07-18 VITALS
OXYGEN SATURATION: 93 % | BODY MASS INDEX: 24.66 KG/M2 | HEART RATE: 65 BPM | TEMPERATURE: 97.3 F | DIASTOLIC BLOOD PRESSURE: 72 MMHG | SYSTOLIC BLOOD PRESSURE: 130 MMHG | HEIGHT: 65 IN | WEIGHT: 148 LBS

## 2025-07-18 PROBLEM — Z63.4 WIDOWED: Status: ACTIVE | Noted: 2025-07-18

## 2025-07-18 PROBLEM — C50.811 MALIGNANT NEOPLASM OF OVERLAPPING SITES OF RIGHT BREAST IN FEMALE, ESTROGEN RECEPTOR POSITIVE: Status: ACTIVE | Noted: 2025-07-18

## 2025-07-18 PROBLEM — K21.9 ACID REFLUX: Status: ACTIVE | Noted: 2025-07-18

## 2025-07-18 PROCEDURE — 99205 OFFICE O/P NEW HI 60 MIN: CPT

## 2025-07-18 RX ORDER — BRINZOLAMIDE/BRIMONIDINE TARTRATE 10; 2 MG/ML; MG/ML
1-0.2 SUSPENSION/ DROPS OPHTHALMIC
Refills: 0 | Status: ACTIVE | COMMUNITY

## 2025-07-18 RX ORDER — RIVAROXABAN 2.5 MG/1
TABLET, FILM COATED ORAL
Refills: 0 | Status: ACTIVE | COMMUNITY

## 2025-07-21 ENCOUNTER — NON-APPOINTMENT (OUTPATIENT)
Age: 89
End: 2025-07-21

## 2025-07-23 ENCOUNTER — NON-APPOINTMENT (OUTPATIENT)
Age: 89
End: 2025-07-23

## 2025-07-24 ENCOUNTER — APPOINTMENT (OUTPATIENT)
Dept: CARDIOLOGY | Facility: CLINIC | Age: 89
End: 2025-07-24
Payer: MEDICARE

## 2025-07-24 VITALS
SYSTOLIC BLOOD PRESSURE: 120 MMHG | HEART RATE: 85 BPM | BODY MASS INDEX: 24.66 KG/M2 | HEIGHT: 65 IN | OXYGEN SATURATION: 93 % | WEIGHT: 148 LBS | DIASTOLIC BLOOD PRESSURE: 62 MMHG

## 2025-07-24 DIAGNOSIS — I49.5 SICK SINUS SYNDROME: ICD-10-CM

## 2025-07-24 DIAGNOSIS — Z95.0 PRESENCE OF CARDIAC PACEMAKER: ICD-10-CM

## 2025-07-24 PROCEDURE — 93280 PM DEVICE PROGR EVAL DUAL: CPT

## 2025-07-25 ENCOUNTER — NON-APPOINTMENT (OUTPATIENT)
Age: 89
End: 2025-07-25

## 2025-07-29 ENCOUNTER — NON-APPOINTMENT (OUTPATIENT)
Age: 89
End: 2025-07-29

## 2025-07-29 ENCOUNTER — APPOINTMENT (OUTPATIENT)
Dept: CARDIOLOGY | Facility: CLINIC | Age: 89
End: 2025-07-29

## 2025-07-29 ENCOUNTER — APPOINTMENT (OUTPATIENT)
Dept: CARDIOLOGY | Facility: CLINIC | Age: 89
End: 2025-07-29
Payer: MEDICARE

## 2025-07-29 PROCEDURE — 93296 REM INTERROG EVL PM/IDS: CPT

## 2025-07-29 PROCEDURE — 93294 REM INTERROG EVL PM/LDLS PM: CPT

## 2025-08-25 ENCOUNTER — OFFICE (OUTPATIENT)
Dept: URBAN - METROPOLITAN AREA CLINIC 8 | Facility: CLINIC | Age: OVER 89
Setting detail: OPHTHALMOLOGY
End: 2025-08-25
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
## 2025-08-25 DIAGNOSIS — H90.3: ICD-10-CM

## 2025-08-25 PROCEDURE — NO SHOW FE NO SHOW FEE: Performed by: AUDIOLOGIST
